# Patient Record
Sex: FEMALE | Race: WHITE | HISPANIC OR LATINO | Employment: UNEMPLOYED | URBAN - METROPOLITAN AREA
[De-identification: names, ages, dates, MRNs, and addresses within clinical notes are randomized per-mention and may not be internally consistent; named-entity substitution may affect disease eponyms.]

---

## 2020-02-11 ENCOUNTER — HOSPITAL ENCOUNTER (EMERGENCY)
Facility: HOSPITAL | Age: 60
Discharge: HOME | End: 2020-02-12
Attending: EMERGENCY MEDICINE | Admitting: EMERGENCY MEDICINE
Payer: COMMERCIAL

## 2020-02-11 ENCOUNTER — APPOINTMENT (EMERGENCY)
Dept: CT IMAGING | Facility: HOSPITAL | Age: 60
End: 2020-02-11
Payer: COMMERCIAL

## 2020-02-11 DIAGNOSIS — R10.13 EPIGASTRIC PAIN: ICD-10-CM

## 2020-02-11 DIAGNOSIS — N30.90 CYSTITIS: Primary | ICD-10-CM

## 2020-02-11 LAB
ALBUMIN SERPL BCP-MCNC: 3.6 G/DL (ref 3.5–5)
ALP SERPL-CCNC: 89 U/L (ref 46–116)
ALT SERPL W P-5'-P-CCNC: 24 U/L (ref 12–78)
ANION GAP SERPL CALCULATED.3IONS-SCNC: 8 MMOL/L (ref 4–13)
AST SERPL W P-5'-P-CCNC: 18 U/L (ref 5–45)
BACTERIA UR QL AUTO: ABNORMAL /HPF
BASOPHILS # BLD AUTO: 0.02 THOUSANDS/ΜL (ref 0–0.1)
BASOPHILS NFR BLD AUTO: 0 % (ref 0–1)
BILIRUB SERPL-MCNC: 0.29 MG/DL (ref 0.2–1)
BILIRUB UR QL STRIP: ABNORMAL
BUN SERPL-MCNC: 13 MG/DL (ref 5–25)
CALCIUM SERPL-MCNC: 8.9 MG/DL (ref 8.3–10.1)
CAOX CRY URNS QL MICRO: ABNORMAL /HPF
CHLORIDE SERPL-SCNC: 107 MMOL/L (ref 100–108)
CLARITY UR: ABNORMAL
CO2 SERPL-SCNC: 30 MMOL/L (ref 21–32)
COLOR UR: ABNORMAL
CREAT SERPL-MCNC: 0.84 MG/DL (ref 0.6–1.3)
EOSINOPHIL # BLD AUTO: 0.19 THOUSAND/ΜL (ref 0–0.61)
EOSINOPHIL NFR BLD AUTO: 2 % (ref 0–6)
ERYTHROCYTE [DISTWIDTH] IN BLOOD BY AUTOMATED COUNT: 14 % (ref 11.6–15.1)
GFR SERPL CREATININE-BSD FRML MDRD: 77 ML/MIN/1.73SQ M
GLUCOSE SERPL-MCNC: 88 MG/DL (ref 65–140)
GLUCOSE UR STRIP-MCNC: NEGATIVE MG/DL
HCT VFR BLD AUTO: 38 % (ref 34.8–46.1)
HGB BLD-MCNC: 12.3 G/DL (ref 11.5–15.4)
HGB UR QL STRIP.AUTO: ABNORMAL
IMM GRANULOCYTES # BLD AUTO: 0.02 THOUSAND/UL (ref 0–0.2)
IMM GRANULOCYTES NFR BLD AUTO: 0 % (ref 0–2)
KETONES UR STRIP-MCNC: ABNORMAL MG/DL
LEUKOCYTE ESTERASE UR QL STRIP: ABNORMAL
LIPASE SERPL-CCNC: 168 U/L (ref 73–393)
LYMPHOCYTES # BLD AUTO: 3.92 THOUSANDS/ΜL (ref 0.6–4.47)
LYMPHOCYTES NFR BLD AUTO: 42 % (ref 14–44)
MCH RBC QN AUTO: 29 PG (ref 26.8–34.3)
MCHC RBC AUTO-ENTMCNC: 32.4 G/DL (ref 31.4–37.4)
MCV RBC AUTO: 90 FL (ref 82–98)
MONOCYTES # BLD AUTO: 0.41 THOUSAND/ΜL (ref 0.17–1.22)
MONOCYTES NFR BLD AUTO: 4 % (ref 4–12)
MUCOUS THREADS UR QL AUTO: ABNORMAL
NEUTROPHILS # BLD AUTO: 4.76 THOUSANDS/ΜL (ref 1.85–7.62)
NEUTS SEG NFR BLD AUTO: 52 % (ref 43–75)
NITRITE UR QL STRIP: NEGATIVE
NON-SQ EPI CELLS URNS QL MICRO: ABNORMAL /HPF
NRBC BLD AUTO-RTO: 0 /100 WBCS
PH UR STRIP.AUTO: 6.5 [PH] (ref 4.5–8)
PLATELET # BLD AUTO: 309 THOUSANDS/UL (ref 149–390)
PMV BLD AUTO: 10.2 FL (ref 8.9–12.7)
POTASSIUM SERPL-SCNC: 3.3 MMOL/L (ref 3.5–5.3)
PROT SERPL-MCNC: 7.7 G/DL (ref 6.4–8.2)
PROT UR STRIP-MCNC: ABNORMAL MG/DL
RBC # BLD AUTO: 4.24 MILLION/UL (ref 3.81–5.12)
RBC #/AREA URNS AUTO: ABNORMAL /HPF
SODIUM SERPL-SCNC: 145 MMOL/L (ref 136–145)
SP GR UR STRIP.AUTO: 1.02 (ref 1–1.03)
UROBILINOGEN UR QL STRIP.AUTO: 1 E.U./DL
WBC # BLD AUTO: 9.32 THOUSAND/UL (ref 4.31–10.16)
WBC #/AREA URNS AUTO: ABNORMAL /HPF

## 2020-02-11 PROCEDURE — 36415 COLL VENOUS BLD VENIPUNCTURE: CPT | Performed by: EMERGENCY MEDICINE

## 2020-02-11 PROCEDURE — 85025 COMPLETE CBC W/AUTO DIFF WBC: CPT | Performed by: EMERGENCY MEDICINE

## 2020-02-11 PROCEDURE — 81001 URINALYSIS AUTO W/SCOPE: CPT

## 2020-02-11 PROCEDURE — 80053 COMPREHEN METABOLIC PANEL: CPT | Performed by: EMERGENCY MEDICINE

## 2020-02-11 PROCEDURE — 99285 EMERGENCY DEPT VISIT HI MDM: CPT | Performed by: EMERGENCY MEDICINE

## 2020-02-11 PROCEDURE — 87086 URINE CULTURE/COLONY COUNT: CPT

## 2020-02-11 PROCEDURE — 99284 EMERGENCY DEPT VISIT MOD MDM: CPT

## 2020-02-11 PROCEDURE — 96361 HYDRATE IV INFUSION ADD-ON: CPT

## 2020-02-11 PROCEDURE — 83690 ASSAY OF LIPASE: CPT | Performed by: EMERGENCY MEDICINE

## 2020-02-11 PROCEDURE — 74177 CT ABD & PELVIS W/CONTRAST: CPT

## 2020-02-11 PROCEDURE — 96374 THER/PROPH/DIAG INJ IV PUSH: CPT

## 2020-02-11 RX ORDER — LIDOCAINE HYDROCHLORIDE 20 MG/ML
10 SOLUTION OROPHARYNGEAL ONCE
Status: COMPLETED | OUTPATIENT
Start: 2020-02-11 | End: 2020-02-11

## 2020-02-11 RX ORDER — ONDANSETRON 2 MG/ML
4 INJECTION INTRAMUSCULAR; INTRAVENOUS ONCE
Status: COMPLETED | OUTPATIENT
Start: 2020-02-11 | End: 2020-02-11

## 2020-02-11 RX ORDER — MAGNESIUM HYDROXIDE/ALUMINUM HYDROXICE/SIMETHICONE 120; 1200; 1200 MG/30ML; MG/30ML; MG/30ML
30 SUSPENSION ORAL ONCE
Status: COMPLETED | OUTPATIENT
Start: 2020-02-11 | End: 2020-02-11

## 2020-02-11 RX ADMIN — ALUMINUM HYDROXIDE, MAGNESIUM HYDROXIDE, AND SIMETHICONE 30 ML: 200; 200; 20 SUSPENSION ORAL at 22:37

## 2020-02-11 RX ADMIN — IOHEXOL 100 ML: 350 INJECTION, SOLUTION INTRAVENOUS at 23:26

## 2020-02-11 RX ADMIN — ONDANSETRON 4 MG: 2 INJECTION INTRAMUSCULAR; INTRAVENOUS at 22:35

## 2020-02-11 RX ADMIN — SODIUM CHLORIDE 1000 ML: 0.9 INJECTION, SOLUTION INTRAVENOUS at 22:35

## 2020-02-11 RX ADMIN — LIDOCAINE HYDROCHLORIDE 10 ML: 20 SOLUTION ORAL; TOPICAL at 22:37

## 2020-02-12 VITALS
SYSTOLIC BLOOD PRESSURE: 114 MMHG | HEART RATE: 60 BPM | DIASTOLIC BLOOD PRESSURE: 69 MMHG | BODY MASS INDEX: 31.6 KG/M2 | OXYGEN SATURATION: 99 % | WEIGHT: 151.2 LBS | TEMPERATURE: 98.3 F | RESPIRATION RATE: 20 BRPM

## 2020-02-12 RX ORDER — CEPHALEXIN 250 MG/1
500 CAPSULE ORAL ONCE
Status: COMPLETED | OUTPATIENT
Start: 2020-02-12 | End: 2020-02-12

## 2020-02-12 RX ORDER — NITROFURANTOIN 25; 75 MG/1; MG/1
100 CAPSULE ORAL 2 TIMES DAILY
Qty: 10 CAPSULE | Refills: 0 | Status: SHIPPED | OUTPATIENT
Start: 2020-02-12 | End: 2020-02-17

## 2020-02-12 RX ORDER — PANTOPRAZOLE SODIUM 40 MG/1
40 TABLET, DELAYED RELEASE ORAL DAILY
Qty: 14 TABLET | Refills: 0 | Status: SHIPPED | OUTPATIENT
Start: 2020-02-12 | End: 2020-02-26

## 2020-02-12 RX ORDER — POTASSIUM CHLORIDE 20 MEQ/1
40 TABLET, EXTENDED RELEASE ORAL ONCE
Status: COMPLETED | OUTPATIENT
Start: 2020-02-12 | End: 2020-02-12

## 2020-02-12 RX ADMIN — CEPHALEXIN 500 MG: 250 CAPSULE ORAL at 00:45

## 2020-02-12 RX ADMIN — POTASSIUM CHLORIDE 40 MEQ: 1500 TABLET, EXTENDED RELEASE ORAL at 00:46

## 2020-02-12 NOTE — ED PROVIDER NOTES
History  Chief Complaint   Patient presents with    Abdominal Pain     per pt "she has been having some abdominal pain for 2wks now with the pain progressively getting worse, pt confirms some N/D but no V "       History provided by:  Patient   used: No    Abdominal Pain   Associated symptoms: diarrhea and nausea    Associated symptoms: no chest pain, no chills, no cough, no dysuria, no fever, no shortness of breath, no sore throat and no vomiting      Patient is a 67-year-old female presenting to emergency department with epigastric pain for last 2 weeks  Getting worse  Worse with eating  Nausea  No vomiting  Having makes of watery and soft bowel movements  10 lb weight loss  No urine complaints  No fevers  No chest pain PND patient's of breath  No history of reflux  No history of getting endoscopy  MDM concern for gastritis, with the weight loss will get CT, will check gallbladder and check lipase  gi cocktail      Prior to Admission Medications   Prescriptions Last Dose Informant Patient Reported? Taking?   oxyCODONE-acetaminophen (PERCOCET) 5-325 mg per tablet   No No   Sig: Take 1 tablet by mouth every 6 (six) hours as needed for moderate pain  Max Daily Amount: 4 tablets      Facility-Administered Medications: None       Past Medical History:   Diagnosis Date    Renal disorder        History reviewed  No pertinent surgical history  History reviewed  No pertinent family history  I have reviewed and agree with the history as documented  Social History     Tobacco Use    Smoking status: Never Smoker    Smokeless tobacco: Never Used   Substance Use Topics    Alcohol use: No    Drug use: No       Review of Systems   Constitutional: Positive for unexpected weight change  Negative for chills, diaphoresis and fever  HENT: Negative for congestion and sore throat  Respiratory: Negative for cough, shortness of breath, wheezing and stridor      Cardiovascular: Negative for chest pain, palpitations and leg swelling  Gastrointestinal: Positive for abdominal pain, diarrhea and nausea  Negative for blood in stool and vomiting  Genitourinary: Negative for dysuria, frequency and urgency  Musculoskeletal: Negative for neck pain and neck stiffness  Skin: Negative for pallor and rash  Neurological: Negative for dizziness, syncope, weakness, light-headedness and headaches  All other systems reviewed and are negative  Physical Exam  Physical Exam   Constitutional: She is oriented to person, place, and time  She appears well-developed and well-nourished  HENT:   Head: Normocephalic and atraumatic  Eyes: Pupils are equal, round, and reactive to light  Neck: Neck supple  Cardiovascular: Normal rate, regular rhythm, normal heart sounds and intact distal pulses  Pulmonary/Chest: Effort normal and breath sounds normal  No respiratory distress  Abdominal: Soft  Bowel sounds are normal  There is tenderness in the epigastric area  Neurological: She is alert and oriented to person, place, and time  Skin: Skin is warm and dry  Capillary refill takes less than 2 seconds  She is not diaphoretic  No erythema  No pallor  Vitals reviewed        Vital Signs  ED Triage Vitals [02/11/20 2132]   Temperature Pulse Respirations Blood Pressure SpO2   98 3 °F (36 8 °C) 65 20 145/66 100 %      Temp Source Heart Rate Source Patient Position - Orthostatic VS BP Location FiO2 (%)   Oral Monitor Sitting Left arm --      Pain Score       9           Vitals:    02/11/20 2132 02/12/20 0014 02/12/20 0043   BP: 145/66 119/71 114/69   Pulse: 65 60 60   Patient Position - Orthostatic VS: Sitting Sitting Lying         Visual Acuity      ED Medications  Medications   ondansetron (ZOFRAN) injection 4 mg (4 mg Intravenous Given 2/11/20 2235)   sodium chloride 0 9 % bolus 1,000 mL (0 mL Intravenous Stopped 2/12/20 0013)   aluminum-magnesium hydroxide-simethicone (MYLANTA) 200-200-20 mg/5 mL oral suspension 30 mL (30 mL Oral Given 2/11/20 2237)   Lidocaine Viscous HCl (XYLOCAINE) 2 % mucosal solution 10 mL (10 mL Swish & Swallow Given 2/11/20 2237)   iohexol (OMNIPAQUE) 350 MG/ML injection (MULTI-DOSE) 100 mL (100 mL Intravenous Given 2/11/20 2326)   cephalexin (KEFLEX) capsule 500 mg (500 mg Oral Given 2/12/20 0045)   potassium chloride (K-DUR,KLOR-CON) CR tablet 40 mEq (40 mEq Oral Given 2/12/20 0046)       Diagnostic Studies  Results Reviewed     Procedure Component Value Units Date/Time    Comprehensive metabolic panel [[id number]]  (Abnormal) Collected:  02/11/20 2234    Lab Status:  Final result Specimen:  Blood from Arm, Right Updated:  02/11/20 2309     Sodium 145 mmol/L      Potassium 3 3 mmol/L      Chloride 107 mmol/L      CO2 30 mmol/L      ANION GAP 8 mmol/L      BUN 13 mg/dL      Creatinine 0 84 mg/dL      Glucose 88 mg/dL      Calcium 8 9 mg/dL      AST 18 U/L      ALT 24 U/L      Alkaline Phosphatase 89 U/L      Total Protein 7 7 g/dL      Albumin 3 6 g/dL      Total Bilirubin 0 29 mg/dL      eGFR 77 ml/min/1 73sq m     Narrative:       [place] guidelines for Chronic Kidney Disease (CKD):     Stage 1 with normal or high GFR (GFR > 90 mL/min/1 73 square meters)    Stage 2 Mild CKD (GFR = 60-89 mL/min/1 73 square meters)    Stage 3A Moderate CKD (GFR = 45-59 mL/min/1 73 square meters)    Stage 3B Moderate CKD (GFR = 30-44 mL/min/1 73 square meters)    Stage 4 Severe CKD (GFR = 15-29 mL/min/1 73 square meters)    Stage 5 End Stage CKD (GFR <15 mL/min/1 73 square meters)  Note: GFR calculation is accurate only with a steady state creatinine    Lipase [[id number]]  (Normal) Collected:  02/11/20 2234    Lab Status:  Final result Specimen:  Blood from Arm, Right Updated:  02/11/20 2309     Lipase 168 u/L     Urine Microscopic [[id number]]  (Abnormal) Collected:  02/11/20 2240    Lab Status:  Final result Specimen:  Urine, Clean Catch Updated:  02/11/20 2304     RBC, UA 2-4 /hpf      WBC, UA Innumerable /hpf      Epithelial Cells None Seen /hpf      Bacteria, UA Occasional /hpf      Ca Oxalate Mis, UA Occasional /hpf      MUCUS THREADS Occasional    Urine culture [[id number]] Collected:  02/11/20 2240    Lab Status: In process Specimen:  Urine, Clean Catch Updated:  02/11/20 2304    CBC and differential [[id number]] Collected:  02/11/20 2234    Lab Status:  Final result Specimen:  Blood from Arm, Right Updated:  02/11/20 2246     WBC 9 32 Thousand/uL      RBC 4 24 Million/uL      Hemoglobin 12 3 g/dL      Hematocrit 38 0 %      MCV 90 fL      MCH 29 0 pg      MCHC 32 4 g/dL      RDW 14 0 %      MPV 10 2 fL      Platelets 546 Thousands/uL      nRBC 0 /100 WBCs      Neutrophils Relative 52 %      Immat GRANS % 0 %      Lymphocytes Relative 42 %      Monocytes Relative 4 %      Eosinophils Relative 2 %      Basophils Relative 0 %      Neutrophils Absolute 4 76 Thousands/µL      Immature Grans Absolute 0 02 Thousand/uL      Lymphocytes Absolute 3 92 Thousands/µL      Monocytes Absolute 0 41 Thousand/µL      Eosinophils Absolute 0 19 Thousand/µL      Basophils Absolute 0 02 Thousands/µL     Urine Macroscopic, POC [[id number]]  (Abnormal) Collected:  02/11/20 2240    Lab Status:  Final result Specimen:  Urine Updated:  02/11/20 2235     Color, UA Lilly     Clarity, UA Slightly Cloudy     pH, UA 6 5     Leukocytes, UA Moderate     Nitrite, UA Negative     Protein, UA 30 (1+) mg/dl      Glucose, UA Negative mg/dl      Ketones, UA 40 (2+) mg/dl      Urobilinogen, UA 1 0 E U /dl      Bilirubin, UA Interference- unable to analyze     Blood, UA Moderate     Specific Gravity, UA 1 025    Narrative:       CLINITEK RESULT                 CT abdomen pelvis with contrast   Final Result by Jaclyn Franklin MD (02/12 0020)      1  Mild thickening of the urinary bladder wall, correlate with urinalysis for cystitis  2   Other findings as above                 Workstation performed: [id number] Procedures  Procedures         ED Course  ED Course as of Feb 12 0556 Wed Feb 12, 2020   0050 Urine culture from 3 years ago shows susceptible to Yeimi Hetal  Will discharge on Macrobid  MDM      Disposition  Final diagnoses:   Cystitis   Epigastric pain     Time reflects when diagnosis was documented in both MDM as applicable and the Disposition within this note     Time User Action Codes Description Comment    2/12/2020 12:51 AM Caro Nieto Add [N30 90] Cystitis     2/12/2020 12:51 AM Caro Nieto Add [R10 13] Epigastric pain       ED Disposition     ED Disposition Condition Date/Time Comment    Discharge Stable Wed Feb 12, 2020 12:51 AM Lawrence Chahal discharge to home/self care              Follow-up Information     Follow up With Specialties Details Why Contact Info Additional [address] Emergency Department Emergency Medicine  As needed, If symptoms worsen [address] [address] [phone], [address], [place], [place], [address] [facility] Gastro[facility] Specialists [facility] Gastroenterology Call in 1 day Please call and follow-up for epigastric pain [place] [facility] [address] [phone] [facility] Specialists [facility] [place], [address] [address], [facility] [place], [place], [address] [facility] Call today Please call and follow up with family doctor, get repeat urine exam [address] [facility] [id number]  [address], [address] [place], [place], [facility]          Discharge Medication List as of 2/12/2020 12:54 AM      START taking these medications    Details   nitrofurantoin (MACROBID) 100 mg capsule Take 1 capsule (100 mg total) by mouth 2 (two) times a day for 5 days, Starting Wed 2/12/2020, Until Mon 2/17/2020, Print      pantoprazole (PROTONIX) 40 mg tablet Take 1 tablet (40 mg total) by mouth daily for 14 days, Starting Wed 2/12/2020, Until Wed 2/26/2020, Print         CONTINUE these medications which have NOT CHANGED    Details   oxyCODONE-acetaminophen (PERCOCET) 5-325 mg per tablet Take 1 tablet by mouth every 6 (six) hours as needed for moderate pain  Max Daily Amount: 4 tablets, Starting 5/23/2016, Until Discontinued, Print           No discharge procedures on file      PDMP Review     None          ED Provider  Electronically Signed by           Keturah Hurd MD  02/12/20 9109

## 2020-02-13 LAB — BACTERIA UR CULT: NORMAL

## 2020-02-26 ENCOUNTER — OFFICE VISIT (OUTPATIENT)
Dept: FAMILY MEDICINE CLINIC | Facility: CLINIC | Age: 60
End: 2020-02-26

## 2020-02-26 VITALS
HEART RATE: 60 BPM | RESPIRATION RATE: 16 BRPM | SYSTOLIC BLOOD PRESSURE: 130 MMHG | TEMPERATURE: 96.9 F | WEIGHT: 147.8 LBS | BODY MASS INDEX: 30.89 KG/M2 | DIASTOLIC BLOOD PRESSURE: 80 MMHG

## 2020-02-26 DIAGNOSIS — R10.13 EPIGASTRIC PAIN: Primary | ICD-10-CM

## 2020-02-26 DIAGNOSIS — Z12.11 SCREEN FOR COLON CANCER: ICD-10-CM

## 2020-02-26 DIAGNOSIS — Z12.39 SCREENING FOR BREAST CANCER: ICD-10-CM

## 2020-02-26 DIAGNOSIS — F32.A DEPRESSION, UNSPECIFIED DEPRESSION TYPE: ICD-10-CM

## 2020-02-26 DIAGNOSIS — Z23 ENCOUNTER FOR IMMUNIZATION: ICD-10-CM

## 2020-02-26 DIAGNOSIS — E87.6 HYPOKALEMIA: ICD-10-CM

## 2020-02-26 PROCEDURE — 90471 IMMUNIZATION ADMIN: CPT | Performed by: PHYSICIAN ASSISTANT

## 2020-02-26 PROCEDURE — 90715 TDAP VACCINE 7 YRS/> IM: CPT | Performed by: PHYSICIAN ASSISTANT

## 2020-02-26 PROCEDURE — 99203 OFFICE O/P NEW LOW 30 MIN: CPT | Performed by: PHYSICIAN ASSISTANT

## 2020-02-26 RX ORDER — PANTOPRAZOLE SODIUM 40 MG/1
40 TABLET, DELAYED RELEASE ORAL DAILY
Qty: 30 TABLET | Refills: 1 | Status: SHIPPED | OUTPATIENT
Start: 2020-02-26 | End: 2020-05-15

## 2020-02-26 NOTE — ASSESSMENT & PLAN NOTE
Depression Screening Follow-up Plan: Patient's depression screening was positive with a PHQ-2 score of 6  Their PHQ-9 score was 11  Patient's depressive symptoms likely due to other medical condition  Would recommend treatment of underlying condition  Will continue to monitor at next office visit

## 2020-02-26 NOTE — PATIENT INSTRUCTIONS
Dieta para úlceras estomacales y gastritis   CUIDADO AMBULATORIO:   Henry dieta para úlceras estomacales y gastritis  es un plan alimenticio que limita los alimentos que irritan lucas BJURHOLM  Ciertos alimentos [employer] síntomas toby dolor de ANNIKA, Ci[place], acidez estomacal o indigestión  Alimentos que debe limitar o evitar:  Es posible que usted necesite evitar los alimentos ácidos, picantes o altos en grasa  No todos los alimentos afectan a las personas de la [employer]  Usted necesitará aprender cuáles alimentos empeoran josse síntomas y tendrá que limitar esos alimentos  Los siguientes son algunos alimentos que podrían empeorar henry úlcera o los síntomas de la gastritis:  · Bebidas:      ¨ Leche entera y [place] chocolatada    ¨ Chocolate caliente y refrescos de cola    ¨ Cualquier bebida con cafeína    ¨ Café regular y descafeinado    ¨ Té de hierbabuena y menta daly    ¨ Té daly o tressa, regular o descafeinado    ¨ Jugos de [facility] y [facility]    ¨ Bebidas alcohólicas    · Especias y condimentos:      Myrle Lilly y jayce    ¨ Polvo de [facility]    ¨ Semilla de [facility] y Prosperity Donna moscada    · Otros alimentos:      ¨ Alimentos lácteos hechos de leche entera o crema    ¨ Chocolate    ¨ Quesos picantes o de sabor maral, toby de [employer] o александр devyn    ¨ Carne con alto contenido de grasa y muy condimentada, toby el melissa, salham, blaise, Dede y embutidos    ¨ Chiles picantes    ¨ Productos derivados del tomate, toby pasta de [place], salsa o jugo del mismo  Alimentos que puede incluir:  Consuma henry variedad de alimentos saludables de todos los grupos alimenticios  Consuma frutas, verduras, granos enteros y productos lácteos descremados o bajos en grasa  Los granos [employer], los cereales, la pasta y el arroz integral  Elija la deshaun Bennett, aves de cruz (kellen Cuellar), pescado, frijoles, huevos y osmani secos   Un plan alimenticio saludable tiene un contenido bajo de grasas no saludables, sal y azúcar  Las grasas saludables incluyen el aceite de nieto y de canola  Solicite a kennedy dietista más información acerca de un plan alimenticio saludable  Otras pautas útiles:   · No coma freedom antes de acostarse  Deje de comer por lo menos 2 horas antes de acostarse  · Coma comidas pequeñas y con frecuencia  Es probable que kennedy estómago tolere mejor comidas pequeñas y frecuentes en vez de comidas grandes  © 2017 [address] Information is for End User's use only and may not be sold, redistributed or otherwise used for commercial purposes  All illustrations and images included in CareNotes® are the copyrighted property of A D A M , Inc  or Lukasz Scott  Esta información es sólo para uso en educación  Kennedy intención no es darle un consejo médico sobre enfermedades o tratamientos  Colsulte con kennedy [facility] farmacéutico antes de seguir cualquier régimen médico para saber si es seguro y efectivo para usted  Lista de alimentos con contenido de potasio   LO QUE NECESITA SABER:   El potasio es un mineral que se encuentra en la mayoría de los alimentos  El potasio ayuda a balancear los líquidos y minerales en kennedy cuerpo  También ayuda al cuerpo a mantener la presión sanguínea a un nivel normal  El potasio facilita las contracciones musculares y la función [place] de los nervios  Es posible que deba aumentar o disminuir el consumo de potasio si sufre ciertas afecciones médicas  INSTRUCCIONES SOBRE EL CLAIRE HOSPITALARIA:   Por qué es posible que deba cambiar la cantidad de potasio que consume:   · Es posible que deba consumir más potasio  si usted tiene hipocalemia (niveles bajos de potasio) o presión arterial claire  También podría necesitar más potasio en kennedy dieta si usted binh diuréticos  Los diuréticos y ciertos medicamentos causan que kennedy cuerpo pierda potasio       · Es posible que deba consumir menos potasio  en kennedy dieta si usted tiene hipercalemia (niveles altos de potasio) o henry enfermedad renal   Contenido de potasio en las frutas:  La cantidad de potasio en miligramos (mg) en cada fruta o porción de fruta, aparece en la lista junto a cada elemento    · Alimentos con alto contenido de potasio (más de 200 mg por porción):      ¨ 1 plátano mediano (425)    ¨ ½ papaya (390)    ¨ ½ taza de jugo de ciruela pasa (370)    ¨ ¼ de henry taza de pasas (270)    ¨ 1 destiney mediano (325) o kiwi (240)    ¨ 1 naranja pequeña (240) o ½ taza de jugo de naranja (235)    ¨ ½ taza de melón myles (215) o melón daly myles (200)    ¨ 1 ani mediana (200)    · Alimentos con un contenido medio de potasio (50 a 200 mg por porción):      ¨ 1 durazno mediano (185)    ¨ 1 manzana pequeña o ½ taza de jugo de Corpus suyapa (150)    ¨ ½ taza de duraznos enlatados en jugo (120)    ¨ ½ taza de concepcion enlatada (100)    ¨ ½ taza de fresas frescas y rebanadas (125)    ¨ ½ taza de sandía (85)    · Alimentos con bajo contenido de potasio (menos de 50 mg por porción):      ¨ ½ taza de arándanos (45) o cóctel de jugo de arándano (20)    ¨ ½ taza de néctar de papaya, destiney o ani (35)  Contenido de [employer] vegetales:   · Alimentos con alto contenido de potasio (más de 200 mg por porción):      ¨ 1 papa mediana horneada con cascara (925)    ¨ 1 camote mediano horneado con cascara (450)    ¨ ½ taza de tomate o jugo de vegetales (275), o 1 tomate crudo de tamaño mediano (290)    ¨ ½ taza de champiñones (280)    ¨ ½ taza de coles de Bruselas frescas (250)    ¨ ½ taza de calabacín cocida (220) o calabaza de invierno (250)    ¨ ¼ de un aguacate mediano (245)    ¨ ½ taza de brócoli (230)    · Alimentos con un contenido medio de potasio (50 a 200 mg por porción):      ¨ ½ taza de maíz (195)    ¨ ½ taza de zanahorias frescas o cocidas (180)    ¨ ½ taza de coliflor fresco (150)    ¨ ½ taza de espárragos (155)    ¨ ½ taza de guisantes enlatados (90)     ¨ 1 taza de parminder, de todo tipo (100)    ¨ ½ taza de habas verdes frescas (90)    ¨ ½ taza de habas verdes congeladas (85)    ¨ ½ taza de pepino (80)  Contenido de potasio en los alimentos ricos en proteína:   · Alimentos con alto contenido de potasio (más de 200 mg por porción):      ¨ ½ taza de frijoles pintos cocidos (400) o lentejas (365)    ¨ 1 taza de leche de soya (300)    ¨ 3 onzas de salmón horneado o asado (319)    ¨ 3 onzas de pavo asado, carne oscura (250)    ¨ ¼ de henry taza de semillas de girasol (241)    ¨ 3 onzas de carne magra de res cocida (224)    ¨ 2 cucharadas de mantequilla de maní suave (210)    · Alimentos con un contenido medio de potasio (50 a 200 mg por porción):      ¨ 1 onza de maní, almendras o anacardos salados (200)    ¨ 1 huevo florinda (60 mg)  Contenido de [employer] productos lácteos:   · Alimentos con alto contenido de potasio (más de 200 mg por porción):      ¨ 6 onzas de yogur (260 a 435)    ¨ 1 taza de leche entera o descremada y baja en grasas (350 a 380)    · Alimentos con un contenido medio de potasio (50 a 200 mg por porción):      ¨ ½ taza de queso ricotta (154)    ¨ ½ taza de helado de vainilla (131)    ¨ ½ taza de requesón (2%) bajo en grasa (110)    · Alimentos con bajo contenido de potasio (menos de 50 mg por porción):      ¨ 1 onza de queso (20 a 30)    Contenido de potasio de los granos:   · 1 rebanada de pan chew (30)    · ½ taza de arroz chew o integral (50)    · ½ taza de espagueti o macarrones (30)    · 1 tortilla de harina o maíz (50)    · 1 wafle de cuatro pulgadas (50)  Contenido de potasio en otros alimentos:   · 1 cucharada de melazas (295)    · 1½ onzas de chocolate (165)    · Algunos sustitutos de la sal pueden contener henry lucio cantidad de potasio  Revise la etiqueta de los alimentos para encontrar la cantidad de potasio que contienen  © 2017 2600 Miguel Rizvi Information is for End User's use only and may not be sold, redistributed or otherwise used for commercial purposes   All illustrations and images included in CareNotes® are the copyrighted property of A D A M , Inc  or Lukasz Scott  Esta información es sólo para uso en educación  Lucas intención no es darle un consejo médico sobre enfermedades o tratamientos  Colsulte con lucas Latanya Clipper farmacéutico antes de seguir cualquier régimen médico para saber si es seguro y efectivo para usted

## 2020-02-26 NOTE — PROGRESS NOTES
Assessment/Plan:    Screening for breast cancer  Requisition provided for screening mammogram    Screen for colon cancer  Refer to GI for screening colonoscopy    Hypokalemia  3 3 2/11/2020  Will recheck    Epigastric pain  Restart Protonix 40 mg daily until seen by GI  Patient has an appointment scheduled to see GI on 03/11/2020  Advised to follow a GERD free diet, handout provided     Depression  Depression Screening Follow-up Plan: Patient's depression screening was positive with a PHQ-2 score of 6  Their PHQ-9 score was 11  Patient's depressive symptoms likely due to other medical condition  Would recommend treatment of underlying condition  Will continue to monitor at next office visit  Diagnoses and all orders for this visit:    Epigastric pain  -     pantoprazole (PROTONIX) 40 mg tablet; Take 1 tablet (40 mg total) by mouth daily    Screen for colon cancer  -     Ambulatory referral to Gastroenterology; Future    Screening for breast cancer  -     Mammo screening bilateral w cad; Future    Encounter for immunization  -     TDAP VACCINE GREATER THAN OR EQUAL TO 8YO IM    Hypokalemia  -     Comprehensive metabolic panel; Future    Depression, unspecified depression type    Other orders  -     Cancel: Hepatitis C antibody; Future  -     Cancel: HIV 1/2 AG-AB combo; Future  -     Cancel: Ambulatory referral to Gastroenterology; Future    Declined hepatitis and HIV screening at this time  Refuse influenza vaccine  Subjective:      Patient ID: Manny Hedrick is a 63 y o  female  New pt here to establish care and c/o  Has past medical h/o gastritis  She was seen at [address] [facility] ED on 2/11/2020 for abdominal pain  Was d/c on protonix 40 mg po qd and referred to GI  Patient has an appointment scheduled with GI for 3/11/2020  She reports some improvement while on Protonix but ran out, burning has returned  Feeling down because of her persist abdominal pain   Is fearing the worst  Listens to her favorite music and begins to feel better  Denies any previous history of depression  Denies any as SI/HI  Abdominal Pain   This is a recurrent problem  The current episode started 1 to 4 weeks ago  The onset quality is gradual  The problem occurs constantly  The problem has been gradually worsening  The pain is located in the generalized abdominal region  The pain is at a severity of 8/10  The quality of the pain is burning  The abdominal pain does not radiate  Associated symptoms include anorexia and nausea  Pertinent negatives include no arthralgias, constipation, diarrhea (Resolved 4-5 days ago), dysuria, fever, frequency, headaches, hematuria, myalgias or vomiting  The pain is aggravated by eating  The pain is relieved by liquids  She has tried proton pump inhibitors for the symptoms  The treatment provided moderate relief  The following portions of the patient's history were reviewed and updated as appropriate: allergies, current medications, past family history, past medical history, past social history, past surgical history and problem list     Review of Systems   Constitutional: Negative for chills, fatigue and fever  Respiratory: Negative for cough, chest tightness, shortness of breath, wheezing and stridor  Cardiovascular: Negative for chest pain and palpitations  Gastrointestinal: Positive for abdominal pain, anorexia and nausea  Negative for blood in stool, constipation, diarrhea (Resolved 4-5 days ago) and vomiting  Genitourinary: Negative for difficulty urinating, dysuria, frequency and hematuria  Musculoskeletal: Negative for arthralgias, myalgias, neck pain and neck stiffness  Skin: Negative for rash and wound  Neurological: Negative for dizziness, weakness, light-headedness, numbness and headaches  Psychiatric/Behavioral: Negative for agitation, behavioral problems and suicidal ideas  The patient is not nervous/anxious           Feels a little down         Objective:      /80 (BP Location: Left arm, Patient Position: Sitting, Cuff Size: Large)   Pulse 60   Temp (!) 96 9 °F (36 1 °C) (Tympanic)   Resp 16   Wt 67 kg (147 lb 12 8 oz)   BMI 30 89 kg/m²          Physical Exam   Constitutional: She is oriented to person, place, and time  She appears well-developed and well-nourished  No distress  HENT:   Head: Normocephalic and atraumatic  Neck: Normal range of motion  Neck supple  Cardiovascular: Normal rate, regular rhythm and normal heart sounds  No murmur heard  Pulmonary/Chest: Effort normal and breath sounds normal  No respiratory distress  She has no wheezes  Abdominal: Soft  Normal appearance and bowel sounds are normal  There is tenderness in the epigastric area  There is no rigidity, no rebound, no guarding and no CVA tenderness  Musculoskeletal: She exhibits no edema or deformity  Lymphadenopathy:     She has no cervical adenopathy  Neurological: She is alert and oriented to person, place, and time     Psychiatric: Her speech is normal and behavior is normal  Judgment and thought content normal  Cognition and memory are normal    Tearful at times

## 2020-02-26 NOTE — ASSESSMENT & PLAN NOTE
Restart Protonix 40 mg daily until seen by GI  Patient has an appointment scheduled to see GI on 03/11/2020    Advised to follow a GERD free diet, handout provided

## 2020-02-28 DIAGNOSIS — Z12.39 SCREENING FOR BREAST CANCER: ICD-10-CM

## 2020-03-11 ENCOUNTER — OFFICE VISIT (OUTPATIENT)
Dept: GASTROENTEROLOGY | Facility: CLINIC | Age: 60
End: 2020-03-11

## 2020-03-11 VITALS
HEART RATE: 76 BPM | SYSTOLIC BLOOD PRESSURE: 114 MMHG | DIASTOLIC BLOOD PRESSURE: 60 MMHG | RESPIRATION RATE: 18 BRPM | TEMPERATURE: 97.6 F | HEIGHT: 59 IN | BODY MASS INDEX: 29.43 KG/M2 | WEIGHT: 146 LBS

## 2020-03-11 DIAGNOSIS — R10.10 PAIN OF UPPER ABDOMEN: Primary | ICD-10-CM

## 2020-03-11 DIAGNOSIS — Z12.11 SCREEN FOR COLON CANCER: ICD-10-CM

## 2020-03-11 DIAGNOSIS — K76.89 LIVER CYST: ICD-10-CM

## 2020-03-11 PROCEDURE — 99204 OFFICE O/P NEW MOD 45 MIN: CPT | Performed by: INTERNAL MEDICINE

## 2020-03-11 NOTE — PROGRESS NOTES
Consultation - 126 [facility]  Arleen Ricardo 1960 female         Chief Complaint:  Abdominal pain    HPI:  62-year-old  female with no significant past medical history reports having abdominal pain for more than a month  We used telephone  for interview  She was seen in the emergency room last month and has been taking Protonix since then with help  She had CT scan, lab workup which were reported to be unremarkable  Denies any NSAID use  Complaining about occasional nausea but denies any vomiting  Good appetite, no recent weight loss  Regular bowel movements and denies any blood or mucus in the stool  She never had colonoscopy in the past     I have reviewed the CT scan which showed large septated cyst in the liver measuring 6 4 x 4 7 cm which has increased from 5 0 x 3 6 few years ago  REVIEW OF SYSTEMS: Review of Systems   Constitutional: Negative for activity change, appetite change, chills, diaphoresis, fatigue, fever and unexpected weight change  HENT: Negative for ear discharge, ear pain, facial swelling, hearing loss, nosebleeds, sore throat, tinnitus and voice change  Eyes: Negative for pain, discharge, redness, itching and visual disturbance  Respiratory: Negative for apnea, cough, chest tightness, shortness of breath and wheezing  Cardiovascular: Negative for chest pain and palpitations  Gastrointestinal:        As noted in HPI   Endocrine: Negative for cold intolerance, heat intolerance and polyuria  Genitourinary: Negative for difficulty urinating, dysuria, flank pain, hematuria and urgency  Musculoskeletal: Negative for arthralgias, back pain, gait problem, joint swelling and myalgias  Skin: Negative for rash and wound  Neurological: Negative for dizziness, tremors, seizures, speech difficulty, light-headedness, numbness and headaches  Hematological: Negative for adenopathy  Does not bruise/bleed easily  Psychiatric/Behavioral: Negative for agitation, behavioral problems and confusion  The patient is not nervous/anxious  Past Medical History:   Diagnosis Date    Renal disorder       Past Surgical History:   Procedure Laterality Date    CHOLECYSTECTOMY       Social History     Socioeconomic History    Marital status: Single     Spouse name: Not on file    Number of children: Not on file    Years of education: Not on file    Highest education level: Not on file   Occupational History    Not on file   Social Needs    Financial resource strain: Not on file    Food insecurity:     Worry: Not on file     Inability: Not on file    Transportation needs:     Medical: Not on file     Non-medical: Not on file   Tobacco Use    Smoking status: Light Tobacco Smoker    Smokeless tobacco: Never Used    Tobacco comment: social    Substance and Sexual Activity    Alcohol use: Yes     Frequency: 2-4 times a month    Drug use: Never    Sexual activity: Yes     Partners: Male     Birth control/protection: None   Lifestyle    Physical activity:     Days per week: Not on file     Minutes per session: Not on file    Stress: Not on file   Relationships    Social connections:     Talks on phone: Not on file     Gets together: Not on file     Attends Pentecostal service: Not on file     Active member of club or organization: Not on file     Attends meetings of clubs or organizations: Not on file     Relationship status: Not on file    Intimate partner violence:     Fear of current or ex partner: Not on file     Emotionally abused: Not on file     Physically abused: Not on file     Forced sexual activity: Not on file   Other Topics Concern    Not on file   Social History Narrative    Not on file     Family History   Problem Relation Age of Onset    Arthritis Mother     Heart attack Father     Coronary artery disease Father     Glaucoma Father      Patient has no known allergies    Current Outpatient Medications Medication Sig Dispense Refill    pantoprazole (PROTONIX) 40 mg tablet Take 1 tablet (40 mg total) by mouth daily 30 tablet 1    Na Sulfate-K Sulfate-Mg Sulf (Suprep Bowel Prep Kit) 17 5-3 13-1 6 GM/177ML SOLN Take 2 Bottles by mouth see administration instructions Please follow the instructions from the office 2 Bottle 0     No current facility-administered medications for this visit  Blood pressure 114/60, pulse 76, temperature 97 6 °F (36 4 °C), resp  rate 18, height 4' 11" (1 499 m), weight 66 2 kg (146 lb)  PHYSICAL EXAM: Physical Exam   Constitutional: She is oriented to person, place, and time  She appears well-developed and well-nourished  HENT:   Head: Normocephalic and atraumatic  Nose: Nose normal    Mouth/Throat: Oropharynx is clear and moist    Eyes: Conjunctivae are normal  Right eye exhibits no discharge  Left eye exhibits no discharge  No scleral icterus  Neck: Neck supple  No JVD present  No tracheal deviation present  No thyromegaly present  Cardiovascular: Normal rate, regular rhythm and normal heart sounds  Exam reveals no gallop and no friction rub  No murmur heard  Pulmonary/Chest: Effort normal and breath sounds normal  No respiratory distress  She has no wheezes  She has no rales  She exhibits no tenderness  Abdominal: Soft  Bowel sounds are normal  She exhibits no distension and no mass  There is no tenderness  There is no rebound and no guarding  No hernia  Musculoskeletal: She exhibits no edema, tenderness or deformity  Lymphadenopathy:     She has no cervical adenopathy  Neurological: She is alert and oriented to person, place, and time  Skin: Skin is warm and dry  No rash noted  No erythema  Psychiatric: She has a normal mood and affect   Her behavior is normal  Thought content normal         Lab Results   Component Value Date    WBC 9 32 02/11/2020    HGB 12 3 02/11/2020    HCT 38 0 02/11/2020    MCV 90 02/11/2020     02/11/2020     Lab Results Component Value Date    CALCIUM 8 9 02/11/2020    K 3 3 (L) 02/11/2020    CO2 30 02/11/2020     02/11/2020    BUN 13 02/11/2020    CREATININE 0 84 02/11/2020     Lab Results   Component Value Date    ALT 24 02/11/2020    AST 18 02/11/2020    ALKPHOS 89 02/11/2020     No results found for: INR, PROTIME    Ct Abdomen Pelvis With Contrast    Result Date: 2/12/2020  Impression: 1  Mild thickening of the urinary bladder wall, correlate with urinalysis for cystitis  2   Other findings as above  Workstation performed: [id number]       ASSESSMENT & PLAN:    Pain of upper abdomen  Nonspecific abdominal pain that has improved on Protonix  Rule out peptic ulcer disease or gastric erosions  Also consider H pylori gastritis  Rule out biliary pathology     -continue Protonix for now    -Patient was explained about the lifestyle and dietary modifications  Advised to avoid fatty foods, chocolates, caffeine, alcohol and any other triggering foods  Avoid eating for at least 3 hours before going to bed  -schedule for EGD    Screen for colon cancer  Screening for colon cancer - patient is at average risk for colon cancer screening  Rule out colorectal lesions including polyps or malignancy         -Schedule for colonoscopy  -High-fiber diet     -Patient was given instructions about the colonoscopy prep     -Patient was explained about  the risks and benefits of the procedure  Risks including but not limited to bleeding, infection, perforation were explained in detail  Also explained about less than 100% sensitivity with the exam and other alternatives            Liver cyst  Septated cyst in the liver that has increased in size from before     -schedule for MRI liver and then consider referral to surgery

## 2020-03-11 NOTE — H&P (VIEW-ONLY)
Consultation - 126 [facility]  Leonela Found 1960 female         Chief Complaint:  Abdominal pain    HPI:  64-year-old  female with no significant past medical history reports having abdominal pain for more than a month  We used telephone  for interview  She was seen in the emergency room last month and has been taking Protonix since then with help  She had CT scan, lab workup which were reported to be unremarkable  Denies any NSAID use  Complaining about occasional nausea but denies any vomiting  Good appetite, no recent weight loss  Regular bowel movements and denies any blood or mucus in the stool  She never had colonoscopy in the past     I have reviewed the CT scan which showed large septated cyst in the liver measuring 6 4 x 4 7 cm which has increased from 5 0 x 3 6 few years ago  REVIEW OF SYSTEMS: Review of Systems   Constitutional: Negative for activity change, appetite change, chills, diaphoresis, fatigue, fever and unexpected weight change  HENT: Negative for ear discharge, ear pain, facial swelling, hearing loss, nosebleeds, sore throat, tinnitus and voice change  Eyes: Negative for pain, discharge, redness, itching and visual disturbance  Respiratory: Negative for apnea, cough, chest tightness, shortness of breath and wheezing  Cardiovascular: Negative for chest pain and palpitations  Gastrointestinal:        As noted in HPI   Endocrine: Negative for cold intolerance, heat intolerance and polyuria  Genitourinary: Negative for difficulty urinating, dysuria, flank pain, hematuria and urgency  Musculoskeletal: Negative for arthralgias, back pain, gait problem, joint swelling and myalgias  Skin: Negative for rash and wound  Neurological: Negative for dizziness, tremors, seizures, speech difficulty, light-headedness, numbness and headaches  Hematological: Negative for adenopathy  Does not bruise/bleed easily  Psychiatric/Behavioral: Negative for agitation, behavioral problems and confusion  The patient is not nervous/anxious  Past Medical History:   Diagnosis Date    Renal disorder       Past Surgical History:   Procedure Laterality Date    CHOLECYSTECTOMY       Social History     Socioeconomic History    Marital status: Single     Spouse name: Not on file    Number of children: Not on file    Years of education: Not on file    Highest education level: Not on file   Occupational History    Not on file   Social Needs    Financial resource strain: Not on file    Food insecurity:     Worry: Not on file     Inability: Not on file    Transportation needs:     Medical: Not on file     Non-medical: Not on file   Tobacco Use    Smoking status: Light Tobacco Smoker    Smokeless tobacco: Never Used    Tobacco comment: social    Substance and Sexual Activity    Alcohol use: Yes     Frequency: 2-4 times a month    Drug use: Never    Sexual activity: Yes     Partners: Male     Birth control/protection: None   Lifestyle    Physical activity:     Days per week: Not on file     Minutes per session: Not on file    Stress: Not on file   Relationships    Social connections:     Talks on phone: Not on file     Gets together: Not on file     Attends Yazidi service: Not on file     Active member of club or organization: Not on file     Attends meetings of clubs or organizations: Not on file     Relationship status: Not on file    Intimate partner violence:     Fear of current or ex partner: Not on file     Emotionally abused: Not on file     Physically abused: Not on file     Forced sexual activity: Not on file   Other Topics Concern    Not on file   Social History Narrative    Not on file     Family History   Problem Relation Age of Onset    Arthritis Mother     Heart attack Father     Coronary artery disease Father     Glaucoma Father      Patient has no known allergies    Current Outpatient Medications Medication Sig Dispense Refill    pantoprazole (PROTONIX) 40 mg tablet Take 1 tablet (40 mg total) by mouth daily 30 tablet 1    Na Sulfate-K Sulfate-Mg Sulf (Suprep Bowel Prep Kit) 17 5-3 13-1 6 GM/177ML SOLN Take 2 Bottles by mouth see administration instructions Please follow the instructions from the office 2 Bottle 0     No current facility-administered medications for this visit  Blood pressure 114/60, pulse 76, temperature 97 6 °F (36 4 °C), resp  rate 18, height 4' 11" (1 499 m), weight 66 2 kg (146 lb)  PHYSICAL EXAM: Physical Exam   Constitutional: She is oriented to person, place, and time  She appears well-developed and well-nourished  HENT:   Head: Normocephalic and atraumatic  Nose: Nose normal    Mouth/Throat: Oropharynx is clear and moist    Eyes: Conjunctivae are normal  Right eye exhibits no discharge  Left eye exhibits no discharge  No scleral icterus  Neck: Neck supple  No JVD present  No tracheal deviation present  No thyromegaly present  Cardiovascular: Normal rate, regular rhythm and normal heart sounds  Exam reveals no gallop and no friction rub  No murmur heard  Pulmonary/Chest: Effort normal and breath sounds normal  No respiratory distress  She has no wheezes  She has no rales  She exhibits no tenderness  Abdominal: Soft  Bowel sounds are normal  She exhibits no distension and no mass  There is no tenderness  There is no rebound and no guarding  No hernia  Musculoskeletal: She exhibits no edema, tenderness or deformity  Lymphadenopathy:     She has no cervical adenopathy  Neurological: She is alert and oriented to person, place, and time  Skin: Skin is warm and dry  No rash noted  No erythema  Psychiatric: She has a normal mood and affect   Her behavior is normal  Thought content normal         Lab Results   Component Value Date    WBC 9 32 02/11/2020    HGB 12 3 02/11/2020    HCT 38 0 02/11/2020    MCV 90 02/11/2020     02/11/2020     Lab Results Component Value Date    CALCIUM 8 9 02/11/2020    K 3 3 (L) 02/11/2020    CO2 30 02/11/2020     02/11/2020    BUN 13 02/11/2020    CREATININE 0 84 02/11/2020     Lab Results   Component Value Date    ALT 24 02/11/2020    AST 18 02/11/2020    ALKPHOS 89 02/11/2020     No results found for: INR, PROTIME    Ct Abdomen Pelvis With Contrast    Result Date: 2/12/2020  Impression: 1  Mild thickening of the urinary bladder wall, correlate with urinalysis for cystitis  2   Other findings as above  Workstation performed: [id number]       ASSESSMENT & PLAN:    Pain of upper abdomen  Nonspecific abdominal pain that has improved on Protonix  Rule out peptic ulcer disease or gastric erosions  Also consider H pylori gastritis  Rule out biliary pathology     -continue Protonix for now    -Patient was explained about the lifestyle and dietary modifications  Advised to avoid fatty foods, chocolates, caffeine, alcohol and any other triggering foods  Avoid eating for at least 3 hours before going to bed  -schedule for EGD    Screen for colon cancer  Screening for colon cancer - patient is at average risk for colon cancer screening  Rule out colorectal lesions including polyps or malignancy         -Schedule for colonoscopy  -High-fiber diet     -Patient was given instructions about the colonoscopy prep     -Patient was explained about  the risks and benefits of the procedure  Risks including but not limited to bleeding, infection, perforation were explained in detail  Also explained about less than 100% sensitivity with the exam and other alternatives            Liver cyst  Septated cyst in the liver that has increased in size from before     -schedule for MRI liver and then consider referral to surgery

## 2020-03-11 NOTE — ASSESSMENT & PLAN NOTE
Nonspecific abdominal pain that has improved on Protonix  Rule out peptic ulcer disease or gastric erosions  Also consider H pylori gastritis  Rule out biliary pathology     -continue Protonix for now    -Patient was explained about the lifestyle and dietary modifications  Advised to avoid fatty foods, chocolates, caffeine, alcohol and any other triggering foods  Avoid eating for at least 3 hours before going to bed          -schedule for EGD

## 2020-03-11 NOTE — ASSESSMENT & PLAN NOTE
Septated cyst in the liver that has increased in size from before     -schedule for MRI liver and then consider referral to surgery

## 2020-03-16 ENCOUNTER — OFFICE VISIT (OUTPATIENT)
Dept: OBGYN CLINIC | Facility: MEDICAL CENTER | Age: 60
End: 2020-03-16

## 2020-03-16 VITALS
HEIGHT: 59 IN | SYSTOLIC BLOOD PRESSURE: 102 MMHG | WEIGHT: 145.1 LBS | BODY MASS INDEX: 29.25 KG/M2 | DIASTOLIC BLOOD PRESSURE: 70 MMHG

## 2020-03-16 DIAGNOSIS — Z01.419 ENCOUNTER FOR ROUTINE GYNECOLOGICAL EXAMINATION WITH PAPANICOLAOU SMEAR OF CERVIX: Primary | ICD-10-CM

## 2020-03-16 DIAGNOSIS — Z12.31 ENCOUNTER FOR SCREENING MAMMOGRAM FOR MALIGNANT NEOPLASM OF BREAST: ICD-10-CM

## 2020-03-16 DIAGNOSIS — R32 URINARY INCONTINENCE IN FEMALE: ICD-10-CM

## 2020-03-16 DIAGNOSIS — N81.10 FEMALE CYSTOCELE: ICD-10-CM

## 2020-03-16 PROCEDURE — 99386 PREV VISIT NEW AGE 40-64: CPT | Performed by: OBSTETRICS & GYNECOLOGY

## 2020-03-16 RX ORDER — COLLAGEN, HYDROLYSATE (BOVINE) 100 %
POWDER (GRAM) MISCELLANEOUS
COMMUNITY

## 2020-03-16 RX ORDER — MELATONIN
1000 DAILY
COMMUNITY

## 2020-03-16 NOTE — PROGRESS NOTES
ASSESSMENT & PLAN: Josue Littlejohn is a 63 y o  [id number] with normal gynecologic exam     1   Routine well woman exam done today  2  Pap and HPV:  The patient's last pap and hpv was maybe 2 years ago no records   It was normal     Pap with cotesting was done today  Current ASCCP Guidelines reviewed  3   Mammogram ordered  4  Colorectal cancer screening was not ordered  5  The following were reviewed in today's visit: breast self exam, mammography screening ordered, menopause, exercise and healthy diet  6  Cystocele - urogyn referral    CC: Annual Gynecologic Examination    HPI: Josue Littlejohn is a 63 y o  [id number] who presents for annual gynecologic examination  She has the following concerns:  Urinary incontinence that is getting worse , uses daily pads     Health Maintenance:    She wears her seatbelt routinely  She does perform regular monthly self breast exams  She feels safe at home       Past Medical History:   Diagnosis Date    Renal disorder        Past Surgical History:   Procedure Laterality Date    CHOLECYSTECTOMY      TUBAL LIGATION      at 29        Past OB/Gyn History:  OB History        5    Para   3    Term   3            AB   2    Living   3       SAB        TAB        Ectopic        Multiple        Live Births   3                 Family History   Problem Relation Age of Onset    Arthritis Mother     Heart attack Father     Coronary artery disease Father     Glaucoma Father        Social History:  Social History     Socioeconomic History    Marital status: Single     Spouse name: Not on file    Number of children: Not on file    Years of education: Not on file    Highest education level: Not on file   Occupational History    Not on file   Social Needs    Financial resource strain: Not on file    Food insecurity:     Worry: Not on file     Inability: Not on file    Transportation needs:     Medical: Not on file     Non-medical: Not on file   Tobacco Use  Smoking status: Never Smoker    Smokeless tobacco: Never Used    Tobacco comment: social    Substance and Sexual Activity    Alcohol use: Not Currently    Drug use: Never    Sexual activity: Yes     Partners: Male     Birth control/protection: Post-menopausal   Lifestyle    Physical activity:     Days per week: Not on file     Minutes per session: Not on file    Stress: Not on file   Relationships    Social connections:     Talks on phone: Not on file     Gets together: Not on file     Attends Lutheran service: Not on file     Active member of club or organization: Not on file     Attends meetings of clubs or organizations: Not on file     Relationship status: Not on file    Intimate partner violence:     Fear of current or ex partner: Not on file     Emotionally abused: Not on file     Physically abused: Not on file     Forced sexual activity: Not on file   Other Topics Concern    Not on file   Social History Narrative    Not on file       No Known Allergies    Current Outpatient Medications:     cholecalciferol (VITAMIN D3) 1,000 units tablet, Take 1,000 Units by mouth daily, Disp: , Rfl:     Collagen Hydrolysate POWD, by Does not apply route, Disp: , Rfl:     cyanocobalamin ([employer] Vitamin B-12) 1000 MCG tablet, Take 1,000 mcg by mouth daily, Disp: , Rfl:     Na Sulfate-K Sulfate-Mg Sulf (Suprep Bowel Prep Kit) 17 5-3 13-1 6 GM/177ML SOLN, Take 2 Bottles by mouth see administration instructions Please follow the instructions from the office, Disp: 2 Bottle, Rfl: 0    pantoprazole (PROTONIX) 40 mg tablet, Take 1 tablet (40 mg total) by mouth daily, Disp: 30 tablet, Rfl: 1      Review of Systems  Constitutional :no fever, feels well, no tiredness, no recent weight gain or loss  ENT: no ear ache, no loss of hearing, no nosebleeds or nasal discharge, no sore throat or hoarseness    Cardiovascular: no complaints of slow or fast heart beat, no chest pain, no palpitations, no leg claudication or lower extremity edema  Respiratory: no complaints of shortness of shortness of breath, no STEVE  Breasts:no complaints of breast pain, breast lump, or nipple discharge  Gastrointestinal: no complaints of abdominal pain, constipation, nausea, vomiting, or diarrhea or bloody stools  Genitourinary : as noted in HPI  Musculoskeletal: no complaints of arthralgia, no myalgia, no joint swelling or stiffness, no limb pain or swelling  Integumentary: no complaints of skin rash or lesion, itching or dry skin  Neurological: no complaints of headache, no confusion, no numbness or tingling, no dizziness or fainting    Objective      /70   Ht 4' 11" (1 499 m)   Wt 65 8 kg (145 lb 1 6 oz)   BMI 29 31 kg/m²     General:   appears stated age, cooperative, alert normal mood and affect   Neck: normal, supple,trachea midline, no masses   Heart: regular rate and rhythm, S1, S2 normal, no murmur, click, rub or gallop   Lungs: clear to auscultation bilaterally   Breasts: normal appearance, no masses or tenderness   Abdomen: soft, non-tender, without masses or organomegaly   Vulva: normal female genitalia   Vagina: vagina positive for cystocele    Urethra: normal   Cervix: Normal, no discharge  Nontender     Uterus: normal size, contour, position, consistency, mobility, non-tender   Adnexa: no mass, fullness, tenderness   Psychiatric orientation to person, place, and time: normal  mood and affect: normal

## 2020-03-18 NOTE — PRE-PROCEDURE INSTRUCTIONS
Pre-Surgery Instructions:   Medication Instructions    cholecalciferol (VITAMIN D3) 1,000 units tablet Patient was instructed by Physician and understands   Collagen Hydrolysate POWD Patient was instructed by Physician and understands   cyanocobalamin ([employer] Vitamin B-12) 1000 MCG tablet Patient was instructed by Physician and understands   Na Sulfate-K Sulfate-Mg Sulf (Suprep Bowel Prep Kit) 17 5-3 13-1 6 GM/177ML SOLN Patient was instructed by Physician and understands   pantoprazole (PROTONIX) 40 mg tablet Patient was instructed by Physician and understands  Pt to follow Dr Mani Rodriguez instructions    daughter Pranay Padilla

## 2020-03-19 LAB
CLINICAL INFO: ABNORMAL
CYTO CVX: ABNORMAL
DATE PREVIOUS BX: ABNORMAL
GEN CATEG CVX/VAG CYTO-IMP: ABNORMAL
HPV E6+E7 MRNA CVX QL NAA+PROBE: DETECTED
LMP START DATE: ABNORMAL
SL AMB PREV. PAP:: ABNORMAL
SPECIMEN SOURCE CVX/VAG CYTO: ABNORMAL

## 2020-03-20 ENCOUNTER — ANESTHESIA EVENT (OUTPATIENT)
Dept: GASTROENTEROLOGY | Facility: AMBULARY SURGERY CENTER | Age: 60
End: 2020-03-20

## 2020-03-22 NOTE — ANESTHESIA PREPROCEDURE EVALUATION
Review of Systems/Medical History  Patient summary reviewed  Chart reviewed  No history of anesthetic complications     Cardiovascular   Pulmonary       GI/Hepatic    GERD , Liver disease (cyst) ,             Endo/Other     GYN      Comment: S/p tubal     Hematology   Musculoskeletal       Neurology   Psychology           Physical Exam    Airway    Mallampati score: III  TM Distance: >3 FB  Neck ROM: full     Dental       Cardiovascular  Rhythm: regular, Rate: normal,     Pulmonary  Breath sounds clear to auscultation,     Other Findings  Bilateral pterygium in eyes      Anesthesia Plan  ASA Score- 2     Anesthesia Type- IV sedation with anesthesia with ASA Monitors  Additional Monitors:   Airway Plan:         Plan Factors-    Induction- intravenous  Postoperative Plan-     Informed Consent- Anesthetic plan and risks discussed with patient  I personally reviewed this patient with the CRNA  Discussed and agreed on the Anesthesia Plan with the CRNA  Harsh Lincoln

## 2020-03-23 ENCOUNTER — ANESTHESIA (OUTPATIENT)
Dept: GASTROENTEROLOGY | Facility: AMBULARY SURGERY CENTER | Age: 60
End: 2020-03-23

## 2020-03-23 ENCOUNTER — HOSPITAL ENCOUNTER (OUTPATIENT)
Dept: GASTROENTEROLOGY | Facility: AMBULARY SURGERY CENTER | Age: 60
Setting detail: OUTPATIENT SURGERY
Discharge: HOME | End: 2020-03-23
Attending: INTERNAL MEDICINE | Admitting: INTERNAL MEDICINE

## 2020-03-23 VITALS
OXYGEN SATURATION: 99 % | DIASTOLIC BLOOD PRESSURE: 69 MMHG | SYSTOLIC BLOOD PRESSURE: 123 MMHG | BODY MASS INDEX: 29.23 KG/M2 | WEIGHT: 145 LBS | TEMPERATURE: 97 F | RESPIRATION RATE: 16 BRPM | HEART RATE: 49 BPM | HEIGHT: 59 IN

## 2020-03-23 DIAGNOSIS — R10.10 PAIN OF UPPER ABDOMEN: ICD-10-CM

## 2020-03-23 DIAGNOSIS — Z12.11 SCREEN FOR COLON CANCER: ICD-10-CM

## 2020-03-23 PROCEDURE — 88305 TISSUE EXAM BY PATHOLOGIST: CPT | Performed by: PATHOLOGY

## 2020-03-23 PROCEDURE — G0121 COLON CA SCRN NOT HI RSK IND: HCPCS | Performed by: INTERNAL MEDICINE

## 2020-03-23 PROCEDURE — 43239 EGD BIOPSY SINGLE/MULTIPLE: CPT | Performed by: INTERNAL MEDICINE

## 2020-03-23 PROCEDURE — NC001 PR NO CHARGE: Performed by: INTERNAL MEDICINE

## 2020-03-23 RX ORDER — PROPOFOL 10 MG/ML
INJECTION, EMULSION INTRAVENOUS AS NEEDED
Status: DISCONTINUED | OUTPATIENT
Start: 2020-03-23 | End: 2020-03-23 | Stop reason: SURG

## 2020-03-23 RX ORDER — SODIUM CHLORIDE 9 MG/ML
75 INJECTION, SOLUTION INTRAVENOUS CONTINUOUS
Status: DISCONTINUED | OUTPATIENT
Start: 2020-03-23 | End: 2020-03-27 | Stop reason: HOSPADM

## 2020-03-23 RX ORDER — LIDOCAINE HYDROCHLORIDE 10 MG/ML
INJECTION, SOLUTION EPIDURAL; INFILTRATION; INTRACAUDAL; PERINEURAL AS NEEDED
Status: DISCONTINUED | OUTPATIENT
Start: 2020-03-23 | End: 2020-03-23 | Stop reason: SURG

## 2020-03-23 RX ADMIN — SODIUM CHLORIDE 75 ML/HR: 0.9 INJECTION, SOLUTION INTRAVENOUS at 09:05

## 2020-03-23 RX ADMIN — PROPOFOL 50 MG: 10 INJECTION, EMULSION INTRAVENOUS at 10:21

## 2020-03-23 RX ADMIN — LIDOCAINE HYDROCHLORIDE 50 MG: 10 INJECTION, SOLUTION EPIDURAL; INFILTRATION; INTRACAUDAL; PERINEURAL at 10:16

## 2020-03-23 RX ADMIN — PROPOFOL 70 MG: 10 INJECTION, EMULSION INTRAVENOUS at 10:16

## 2020-03-23 RX ADMIN — PROPOFOL 50 MG: 10 INJECTION, EMULSION INTRAVENOUS at 10:18

## 2020-03-23 NOTE — INTERVAL H&P NOTE
H&P reviewed  After examining the patient I find no changes in the patients condition since the H&P had been written      Vitals:    03/23/20 0850   BP: 132/63   Pulse: (!) 54   Resp: 18   Temp: (!) 97 °F (36 1 °C)   SpO2: 98%

## 2020-03-23 NOTE — ANESTHESIA POSTPROCEDURE EVALUATION
Post-Op Assessment Note    CV Status:  Stable  Pain Score: 0    Pain management: adequate     Mental Status:  Awake   Hydration Status:  Stable   PONV Controlled:  None   Airway Patency:  Patent   Post Op Vitals Reviewed: Yes      Staff: CRNA   Comments: spontaneously breathing, HOB @ 30 degrees, awake & comfortable, fully endorsed to recovery w/o AC          BP   123/67   Temp     Pulse 72   Resp   14   SpO2   100

## 2020-03-25 ENCOUNTER — TELEMEDICINE (OUTPATIENT)
Dept: FAMILY MEDICINE CLINIC | Facility: CLINIC | Age: 60
End: 2020-03-25

## 2020-03-25 DIAGNOSIS — F32.A DEPRESSION, UNSPECIFIED DEPRESSION TYPE: ICD-10-CM

## 2020-03-25 DIAGNOSIS — E87.6 HYPOKALEMIA: ICD-10-CM

## 2020-03-25 DIAGNOSIS — R10.13 EPIGASTRIC PAIN: Primary | ICD-10-CM

## 2020-03-25 PROCEDURE — 99442 PR PHYS/QHP TELEPHONE EVALUATION 11-20 MIN: CPT | Performed by: PHYSICIAN ASSISTANT

## 2020-03-25 NOTE — ASSESSMENT & PLAN NOTE
Continue Protonix 40 mg daily  Advised to follow a GERD free diet    Advised to avoid tomato based foods, fried fatty foods, acidic foods, chocolate, caffeine, alcohol, and peppermint

## 2020-03-25 NOTE — PROGRESS NOTES
Virtual Regular Visit    Problem List Items Addressed This Visit        Other    Epigastric pain - Primary     Continue Protonix 40 mg daily  Advised to follow a GERD free diet  Advised to avoid tomato based foods, fried fatty foods, acidic foods, chocolate, caffeine, alcohol, and peppermint         Hypokalemia     Last K+ 3 8 2/27/2020         Depression     Doing well  Feels depression has resolved                      Reason for visit is 4 week f/u epi epigastric pain    Encounter provider [address], [facility]    Provider located at [facility] [address] [facility] [address] [facility]   [phone]      Recent Visits  No visits were found meeting these conditions  Showing recent visits within past 7 days and meeting all other requirements     Future Appointments  No visits were found meeting these conditions  Showing future appointments within next 150 days and meeting all other requirements        After connecting through [employer], the patient was identified by name and date of birth  Arleen Ricardo was informed that this is a telemedicine visit and that the visit is being conducted through telephone which may not be secure and therefore, might not be HIPAA-compliant  My office door was closed  No one else was in the room  She acknowledged consent and understanding of privacy and security of the video platform  The patient has agreed to participate and understands they can discontinue the visit at any time  Subjective  Arleen Ricardo is a 63 y o  female with a past medical history of epigastric pain and hypokalemia  She was seen by GI on 3/11/2020, advised to continue Protonix 40 mg daily, EGD, colonoscopy, and cyst on liver- MRI of liver ordered  She reports her epigastric pain has improved  Had a flare after eating pizza a few days ago  Continues to take Protonix 40 mg daily    EGD findings- chronic gastritis   Colonoscopy- normal repeat in 10 years  MRI liver -cyst scheduled for 3/30/2020  Had PAP completed on 3/16/2020- ASC-US HPV+  Pt has colposcopy scheduled for 4/3/2020  Hypokalemia-labs drawn on 2/27/2020- K=3 8    Depression- is no longer feeling down or depressed  Is doing well  No SI/HI    Past Medical History:   Diagnosis Date    Abdominal pain     "burning pain" came to the ED    Renal disorder     Wears dentures     permanent upper denture    Weight loss     20 lb wt loss in 2-3 weeks d/t stomach issues       Past Surgical History:   Procedure Laterality Date    CHOLECYSTECTOMY      TUBAL LIGATION      at 29        Current Outpatient Medications   Medication Sig Dispense Refill    cholecalciferol (VITAMIN D3) 1,000 units tablet Take 1,000 Units by mouth daily      Collagen Hydrolysate POWD by Does not apply route Powder in water      cyanocobalamin ([employer] Vitamin B-12) 1000 MCG tablet Take 1,000 mcg by mouth daily      Na Sulfate-K Sulfate-Mg Sulf (Suprep Bowel Prep Kit) 17 5-3 13-1 6 GM/177ML SOLN Take 2 Bottles by mouth see administration instructions Please follow the instructions from the office 2 Bottle 0    pantoprazole (PROTONIX) 40 mg tablet Take 1 tablet (40 mg total) by mouth daily 30 tablet 1     No current facility-administered medications for this visit  Facility-Administered Medications Ordered in Other Visits   Medication Dose Route Frequency Provider Last Rate Last Dose    sodium chloride 0 9 % infusion  75 mL/hr Intravenous Continuous Shruti Oconnell MD   Stopped at 03/23/20 1031        No Known Allergies    Review of Systems   Constitutional: Negative for chills, fatigue and fever  Respiratory: Negative for cough, chest tightness, shortness of breath and wheezing  Cardiovascular: Negative for chest pain and palpitations  Gastrointestinal: Negative for abdominal pain, constipation, diarrhea, nausea and vomiting  Heartburn   Genitourinary: Negative for difficulty urinating  Musculoskeletal: Negative for arthralgias, myalgias, neck pain and neck stiffness  Skin: Negative for rash and wound  Neurological: Negative for dizziness, weakness, light-headedness, numbness and headaches  Psychiatric/Behavioral: Negative for agitation and behavioral problems  The patient is not nervous/anxious  I spent 20 minutes with the patient during this visit

## 2020-03-30 ENCOUNTER — HOSPITAL ENCOUNTER (OUTPATIENT)
Dept: RADIOLOGY | Facility: HOSPITAL | Age: 60
Discharge: HOME | End: 2020-03-30
Attending: INTERNAL MEDICINE

## 2020-03-30 DIAGNOSIS — K76.89 LIVER CYST: ICD-10-CM

## 2020-03-30 PROCEDURE — A9585 GADOBUTROL INJECTION: HCPCS | Performed by: INTERNAL MEDICINE

## 2020-03-30 PROCEDURE — 74183 MRI ABD W/O CNTR FLWD CNTR: CPT

## 2020-03-30 RX ADMIN — GADOBUTROL 6.5 ML: 604.72 INJECTION INTRAVENOUS at 11:25

## 2020-03-31 DIAGNOSIS — K76.89 LIVER CYST: Primary | ICD-10-CM

## 2020-04-03 ENCOUNTER — PROCEDURE VISIT (OUTPATIENT)
Dept: OBGYN CLINIC | Facility: MEDICAL CENTER | Age: 60
End: 2020-04-03

## 2020-04-03 VITALS
BODY MASS INDEX: 29.33 KG/M2 | HEIGHT: 59 IN | WEIGHT: 145.5 LBS | SYSTOLIC BLOOD PRESSURE: 120 MMHG | DIASTOLIC BLOOD PRESSURE: 80 MMHG

## 2020-04-03 DIAGNOSIS — R87.610 ASCUS WITH POSITIVE HIGH RISK HPV CERVICAL: Primary | ICD-10-CM

## 2020-04-03 DIAGNOSIS — R87.810 ASCUS WITH POSITIVE HIGH RISK HPV CERVICAL: Primary | ICD-10-CM

## 2020-04-03 PROCEDURE — 3008F BODY MASS INDEX DOCD: CPT | Performed by: OBSTETRICS & GYNECOLOGY

## 2020-04-03 PROCEDURE — 3008F BODY MASS INDEX DOCD: CPT | Performed by: PHYSICIAN ASSISTANT

## 2020-04-03 PROCEDURE — 57454 BX/CURETT OF CERVIX W/SCOPE: CPT | Performed by: OBSTETRICS & GYNECOLOGY

## 2020-04-06 LAB
CLINICAL INFO: NORMAL
PATH REPORT.FINAL DX SPEC: NORMAL
PROCEDURE TYPE: NORMAL
PROCEDURE TYPE: NORMAL
SPECIMEN SOURCE: NORMAL
SPECIMEN SOURCE: NORMAL

## 2020-04-16 ENCOUNTER — TELEMEDICINE (OUTPATIENT)
Dept: OBGYN CLINIC | Facility: MEDICAL CENTER | Age: 60
End: 2020-04-16
Payer: COMMERCIAL

## 2020-04-16 VITALS — WEIGHT: 147 LBS | BODY MASS INDEX: 29.69 KG/M2

## 2020-04-16 DIAGNOSIS — R87.610 ATYPICAL SQUAMOUS CELLS OF UNDETERMINED SIGNIFICANCE ON CYTOLOGIC SMEAR OF CERVIX (ASC-US): Primary | ICD-10-CM

## 2020-04-16 PROBLEM — R87.619 ABNORMAL PAP SMEAR OF CERVIX: Status: ACTIVE | Noted: 2020-04-16

## 2020-04-16 PROCEDURE — 99441 PR PHYS/QHP TELEPHONE EVALUATION 5-10 MIN: CPT | Performed by: OBSTETRICS & GYNECOLOGY

## 2020-05-14 DIAGNOSIS — R10.13 EPIGASTRIC PAIN: ICD-10-CM

## 2020-05-15 RX ORDER — PANTOPRAZOLE SODIUM 40 MG/1
TABLET, DELAYED RELEASE ORAL
Qty: 30 TABLET | Refills: 1 | Status: SHIPPED | OUTPATIENT
Start: 2020-05-15

## 2021-03-15 ENCOUNTER — DOCUMENTATION (OUTPATIENT)
Dept: OBGYN CLINIC | Facility: MEDICAL CENTER | Age: 61
End: 2021-03-15

## 2021-03-25 ENCOUNTER — IMMUNIZATIONS (OUTPATIENT)
Dept: FAMILY MEDICINE CLINIC | Facility: HOSPITAL | Age: 61
End: 2021-03-25

## 2021-03-25 DIAGNOSIS — Z23 ENCOUNTER FOR IMMUNIZATION: Primary | ICD-10-CM

## 2021-03-25 PROCEDURE — 0001A SARS-COV-2 / COVID-19 MRNA VACCINE (PFIZER-BIONTECH) 30 MCG: CPT

## 2021-03-25 PROCEDURE — 91300 SARS-COV-2 / COVID-19 MRNA VACCINE (PFIZER-BIONTECH) 30 MCG: CPT

## 2021-04-18 ENCOUNTER — IMMUNIZATIONS (OUTPATIENT)
Dept: FAMILY MEDICINE CLINIC | Facility: HOSPITAL | Age: 61
End: 2021-04-18

## 2021-04-18 DIAGNOSIS — Z23 ENCOUNTER FOR IMMUNIZATION: Primary | ICD-10-CM

## 2021-04-18 PROCEDURE — 0002A SARS-COV-2 / COVID-19 MRNA VACCINE (PFIZER-BIONTECH) 30 MCG: CPT | Performed by: OBSTETRICS & GYNECOLOGY

## 2021-04-18 PROCEDURE — 91300 SARS-COV-2 / COVID-19 MRNA VACCINE (PFIZER-BIONTECH) 30 MCG: CPT | Performed by: OBSTETRICS & GYNECOLOGY

## 2021-10-22 ENCOUNTER — TELEPHONE (OUTPATIENT)
Dept: GASTROENTEROLOGY | Facility: AMBULARY SURGERY CENTER | Age: 61
End: 2021-10-22

## 2021-10-22 NOTE — TELEPHONE ENCOUNTER
Received voicemail on result line from [address] [facility] requesting pt's colonoscopy/egd procedure note and pathology report be faxed to [phone]  Faxed over reports

## 2021-10-30 ENCOUNTER — APPOINTMENT (OUTPATIENT)
Dept: LAB | Facility: HOSPITAL | Age: 61
End: 2021-10-30
Payer: COMMERCIAL

## 2021-10-30 DIAGNOSIS — Z13.6 SCREENING FOR ISCHEMIC HEART DISEASE: ICD-10-CM

## 2021-10-30 LAB
ALBUMIN SERPL BCP-MCNC: 3.6 G/DL (ref 3.5–5)
ALP SERPL-CCNC: 90 U/L (ref 46–116)
ALT SERPL W P-5'-P-CCNC: 36 U/L (ref 12–78)
ANION GAP SERPL CALCULATED.3IONS-SCNC: 7 MMOL/L (ref 4–13)
AST SERPL W P-5'-P-CCNC: 21 U/L (ref 5–45)
BILIRUB SERPL-MCNC: 0.34 MG/DL (ref 0.2–1)
BUN SERPL-MCNC: 20 MG/DL (ref 5–25)
CALCIUM SERPL-MCNC: 8.6 MG/DL (ref 8.3–10.1)
CHLORIDE SERPL-SCNC: 105 MMOL/L (ref 100–108)
CHOLEST SERPL-MCNC: 203 MG/DL (ref 50–200)
CO2 SERPL-SCNC: 27 MMOL/L (ref 21–32)
CREAT SERPL-MCNC: 0.65 MG/DL (ref 0.6–1.3)
GFR SERPL CREATININE-BSD FRML MDRD: 97 ML/MIN/1.73SQ M
GLUCOSE P FAST SERPL-MCNC: 93 MG/DL (ref 65–99)
HDLC SERPL-MCNC: 55 MG/DL
LDLC SERPL CALC-MCNC: 125 MG/DL (ref 0–100)
NONHDLC SERPL-MCNC: 148 MG/DL
POTASSIUM SERPL-SCNC: 3.9 MMOL/L (ref 3.5–5.3)
PROT SERPL-MCNC: 7.9 G/DL (ref 6.4–8.2)
SODIUM SERPL-SCNC: 139 MMOL/L (ref 136–145)
TRIGL SERPL-MCNC: 116 MG/DL

## 2021-10-30 PROCEDURE — 80061 LIPID PANEL: CPT

## 2021-10-30 PROCEDURE — 36415 COLL VENOUS BLD VENIPUNCTURE: CPT

## 2021-10-30 PROCEDURE — 80053 COMPREHEN METABOLIC PANEL: CPT

## 2022-01-22 ENCOUNTER — IMMUNIZATIONS (OUTPATIENT)
Dept: FAMILY MEDICINE CLINIC | Facility: HOSPITAL | Age: 62
End: 2022-01-22

## 2022-01-22 DIAGNOSIS — Z23 ENCOUNTER FOR IMMUNIZATION: Primary | ICD-10-CM

## 2022-01-22 PROCEDURE — 91300 COVID-19 PFIZER VACC 0.3 ML: CPT

## 2022-01-22 PROCEDURE — 0001A COVID-19 PFIZER VACC 0.3 ML: CPT

## 2023-05-10 ENCOUNTER — APPOINTMENT (EMERGENCY)
Dept: RADIOLOGY | Facility: HOSPITAL | Age: 63
End: 2023-05-10

## 2023-05-10 ENCOUNTER — HOSPITAL ENCOUNTER (INPATIENT)
Facility: HOSPITAL | Age: 63
LOS: 1 days | Discharge: HOME | End: 2023-05-11
Attending: EMERGENCY MEDICINE | Admitting: INTERNAL MEDICINE

## 2023-05-10 ENCOUNTER — APPOINTMENT (INPATIENT)
Dept: MRI IMAGING | Facility: HOSPITAL | Age: 63
End: 2023-05-10

## 2023-05-10 ENCOUNTER — APPOINTMENT (EMERGENCY)
Dept: CT IMAGING | Facility: HOSPITAL | Age: 63
End: 2023-05-10

## 2023-05-10 ENCOUNTER — APPOINTMENT (INPATIENT)
Dept: NON INVASIVE DIAGNOSTICS | Facility: HOSPITAL | Age: 63
End: 2023-05-10

## 2023-05-10 DIAGNOSIS — K76.89 LIVER CYST: ICD-10-CM

## 2023-05-10 DIAGNOSIS — I63.9 STROKE (HCC): Primary | ICD-10-CM

## 2023-05-10 DIAGNOSIS — Z23 ENCOUNTER FOR IMMUNIZATION: ICD-10-CM

## 2023-05-10 DIAGNOSIS — R87.610 ATYPICAL SQUAMOUS CELLS OF UNDETERMINED SIGNIFICANCE ON CYTOLOGIC SMEAR OF CERVIX (ASC-US): ICD-10-CM

## 2023-05-10 DIAGNOSIS — R29.90 STROKE-LIKE SYMPTOMS: ICD-10-CM

## 2023-05-10 DIAGNOSIS — R10.10 PAIN OF UPPER ABDOMEN: ICD-10-CM

## 2023-05-10 DIAGNOSIS — F32.A DEPRESSION, UNSPECIFIED DEPRESSION TYPE: ICD-10-CM

## 2023-05-10 DIAGNOSIS — E87.6 HYPOKALEMIA: ICD-10-CM

## 2023-05-10 DIAGNOSIS — R10.13 EPIGASTRIC PAIN: ICD-10-CM

## 2023-05-10 DIAGNOSIS — Z12.11 SCREEN FOR COLON CANCER: ICD-10-CM

## 2023-05-10 DIAGNOSIS — Z12.39 SCREENING FOR BREAST CANCER: ICD-10-CM

## 2023-05-10 DIAGNOSIS — R29.810 FACIAL DROOP: ICD-10-CM

## 2023-05-10 PROBLEM — E66.9 OBESITY: Status: ACTIVE | Noted: 2023-05-10

## 2023-05-10 PROBLEM — R51.9 HEADACHE: Status: ACTIVE | Noted: 2023-05-10

## 2023-05-10 LAB
2HR DELTA HS TROPONIN: 0 NG/L
4HR DELTA HS TROPONIN: 0 NG/L
ANION GAP SERPL CALCULATED.3IONS-SCNC: 8 MMOL/L (ref 4–13)
AORTIC ROOT: 3.1 CM
AORTIC VALVE MEAN VELOCITY: 10.5 M/S
APICAL FOUR CHAMBER EJECTION FRACTION: 64 %
APTT PPP: 29 SECONDS (ref 23–37)
ASCENDING AORTA: 3.3 CM
ATRIAL RATE: 73 BPM
ATRIAL RATE: 74 BPM
AV LVOT MEAN GRADIENT: 3 MMHG
AV LVOT PEAK GRADIENT: 5 MMHG
AV MEAN GRADIENT: 5 MMHG
AV PEAK GRADIENT: 9 MMHG
AV REGURGITATION PRESSURE HALF TIME: 591 MS
AV VELOCITY RATIO: 0.73
BUN SERPL-MCNC: 9 MG/DL (ref 5–25)
CALCIUM SERPL-MCNC: 9.5 MG/DL (ref 8.4–10.2)
CARDIAC TROPONIN I PNL SERPL HS: 4 NG/L
CHLORIDE SERPL-SCNC: 101 MMOL/L (ref 96–108)
CHOLEST SERPL-MCNC: 187 MG/DL
CO2 SERPL-SCNC: 30 MMOL/L (ref 21–32)
CREAT SERPL-MCNC: 0.59 MG/DL (ref 0.6–1.3)
DOP CALC AO PEAK VEL: 1.53 M/S
DOP CALC AO VTI: 29.86 CM
DOP CALC LVOT PEAK VEL VTI: 23.62 CM
DOP CALC LVOT PEAK VEL: 1.11 M/S
E WAVE DECELERATION TIME: 189 MS
ERYTHROCYTE [DISTWIDTH] IN BLOOD BY AUTOMATED COUNT: 13.5 % (ref 11.6–15.1)
EST. AVERAGE GLUCOSE BLD GHB EST-MCNC: 114 MG/DL
FLUAV RNA RESP QL NAA+PROBE: NEGATIVE
FLUBV RNA RESP QL NAA+PROBE: NEGATIVE
FRACTIONAL SHORTENING: 39 % (ref 28–44)
GFR SERPL CREATININE-BSD FRML MDRD: 98 ML/MIN/1.73SQ M
GLUCOSE SERPL-MCNC: 88 MG/DL (ref 65–140)
GLUCOSE SERPL-MCNC: 91 MG/DL (ref 65–140)
HBA1C MFR BLD: 5.6 %
HCT VFR BLD AUTO: 39.7 % (ref 34.8–46.1)
HDLC SERPL-MCNC: 51 MG/DL
HGB BLD-MCNC: 12.9 G/DL (ref 11.5–15.4)
INR PPP: 0.97 (ref 0.84–1.19)
INTERVENTRICULAR SEPTUM IN DIASTOLE (PARASTERNAL SHORT AXIS VIEW): 1.2 CM
INTERVENTRICULAR SEPTUM: 1.2 CM (ref 0.6–1.1)
LAAS-AP2: 19.7 CM2
LAAS-AP4: 13.9 CM2
LDLC SERPL CALC-MCNC: 80 MG/DL (ref 0–100)
LEFT ATRIUM SIZE: 4.3 CM
LEFT INTERNAL DIMENSION IN SYSTOLE: 2.3 CM (ref 2.1–4)
LEFT VENTRICULAR INTERNAL DIMENSION IN DIASTOLE: 3.8 CM (ref 3.5–6)
LEFT VENTRICULAR POSTERIOR WALL IN END DIASTOLE: 1 CM
LEFT VENTRICULAR STROKE VOLUME: 42 ML
LVSV (TEICH): 42 ML
MCH RBC QN AUTO: 29.1 PG (ref 26.8–34.3)
MCHC RBC AUTO-ENTMCNC: 32.5 G/DL (ref 31.4–37.4)
MCV RBC AUTO: 89 FL (ref 82–98)
MV E'TISSUE VEL-SEP: 7 CM/S
MV PEAK A VEL: 0.76 M/S
MV PEAK E VEL: 66 CM/S
MV STENOSIS PRESSURE HALF TIME: 55 MS
MV VALVE AREA P 1/2 METHOD: 4 CM2
P AXIS: 52 DEGREES
P AXIS: 65 DEGREES
PLATELET # BLD AUTO: 278 THOUSANDS/UL (ref 149–390)
PLATELET # BLD AUTO: 303 THOUSANDS/UL (ref 149–390)
PMV BLD AUTO: 10.2 FL (ref 8.9–12.7)
PMV BLD AUTO: 10.2 FL (ref 8.9–12.7)
POTASSIUM SERPL-SCNC: 3.7 MMOL/L (ref 3.5–5.3)
PR INTERVAL: 150 MS
PR INTERVAL: 150 MS
PROTHROMBIN TIME: 13.1 SECONDS (ref 11.6–14.5)
QRS AXIS: 47 DEGREES
QRS AXIS: 52 DEGREES
QRSD INTERVAL: 78 MS
QRSD INTERVAL: 78 MS
QT INTERVAL: 378 MS
QT INTERVAL: 378 MS
QTC INTERVAL: 416 MS
QTC INTERVAL: 419 MS
RBC # BLD AUTO: 4.44 MILLION/UL (ref 3.81–5.12)
RIGHT ATRIUM AREA SYSTOLE A4C: 18.1 CM2
RIGHT VENTRICLE ID DIMENSION: 4.1 CM
RSV RNA RESP QL NAA+PROBE: NEGATIVE
SARS-COV-2 RNA RESP QL NAA+PROBE: NEGATIVE
SL CV AV DECELERATION TIME RETROGRADE: 2038 MS
SL CV AV PEAK GRADIENT RETROGRADE: 60 MMHG
SL CV LEFT ATRIUM LENGTH A2C: 5.8 CM
SL CV LV EF: 55
SL CV PED ECHO LEFT VENTRICLE DIASTOLIC VOLUME (MOD BIPLANE) 2D: 60 ML
SL CV PED ECHO LEFT VENTRICLE SYSTOLIC VOLUME (MOD BIPLANE) 2D: 18 ML
SODIUM SERPL-SCNC: 139 MMOL/L (ref 135–147)
T WAVE AXIS: 51 DEGREES
T WAVE AXIS: 51 DEGREES
TRICUSPID ANNULAR PLANE SYSTOLIC EXCURSION: 2.2 CM
TRIGL SERPL-MCNC: 282 MG/DL
VENTRICULAR RATE: 73 BPM
VENTRICULAR RATE: 74 BPM
WBC # BLD AUTO: 8.15 THOUSAND/UL (ref 4.31–10.16)

## 2023-05-10 RX ORDER — KETOROLAC TROMETHAMINE 30 MG/ML
15 INJECTION, SOLUTION INTRAMUSCULAR; INTRAVENOUS ONCE
Status: COMPLETED | OUTPATIENT
Start: 2023-05-10 | End: 2023-05-10

## 2023-05-10 RX ORDER — CLOPIDOGREL BISULFATE 75 MG/1
180 TABLET ORAL DAILY
Qty: 20 TABLET | Refills: 0 | Status: SHIPPED | OUTPATIENT
Start: 2023-05-10 | End: 2023-05-11

## 2023-05-10 RX ORDER — ASPIRIN 81 MG/1
324 TABLET, CHEWABLE ORAL DAILY
Qty: 20 TABLET | Refills: 0 | Status: SHIPPED | OUTPATIENT
Start: 2023-05-10 | End: 2023-05-11

## 2023-05-10 RX ORDER — CLOPIDOGREL BISULFATE 75 MG/1
300 TABLET ORAL ONCE
Status: COMPLETED | OUTPATIENT
Start: 2023-05-10 | End: 2023-05-10

## 2023-05-10 RX ORDER — ACETAMINOPHEN 325 MG/1
650 TABLET ORAL EVERY 6 HOURS PRN
Status: DISCONTINUED | OUTPATIENT
Start: 2023-05-10 | End: 2023-05-11 | Stop reason: HOSPADM

## 2023-05-10 RX ORDER — DIPHENHYDRAMINE HYDROCHLORIDE 50 MG/ML
25 INJECTION INTRAMUSCULAR; INTRAVENOUS ONCE
Status: COMPLETED | OUTPATIENT
Start: 2023-05-10 | End: 2023-05-10

## 2023-05-10 RX ORDER — ENOXAPARIN SODIUM 100 MG/ML
40 INJECTION SUBCUTANEOUS EVERY 24 HOURS
Status: DISCONTINUED | OUTPATIENT
Start: 2023-05-10 | End: 2023-05-11 | Stop reason: HOSPADM

## 2023-05-10 RX ORDER — SENNOSIDES 8.6 MG
1 TABLET ORAL DAILY
Status: DISCONTINUED | OUTPATIENT
Start: 2023-05-10 | End: 2023-05-11 | Stop reason: HOSPADM

## 2023-05-10 RX ORDER — MELATONIN
1000 DAILY
Status: DISCONTINUED | OUTPATIENT
Start: 2023-05-10 | End: 2023-05-11 | Stop reason: HOSPADM

## 2023-05-10 RX ORDER — PANTOPRAZOLE SODIUM 40 MG/1
40 TABLET, DELAYED RELEASE ORAL
Status: DISCONTINUED | OUTPATIENT
Start: 2023-05-11 | End: 2023-05-11 | Stop reason: HOSPADM

## 2023-05-10 RX ORDER — ATORVASTATIN CALCIUM 40 MG/1
40 TABLET, FILM COATED ORAL
Status: DISCONTINUED | OUTPATIENT
Start: 2023-05-10 | End: 2023-05-11 | Stop reason: HOSPADM

## 2023-05-10 RX ORDER — KETOROLAC TROMETHAMINE 30 MG/ML
15 INJECTION, SOLUTION INTRAMUSCULAR; INTRAVENOUS EVERY 6 HOURS PRN
Status: DISCONTINUED | OUTPATIENT
Start: 2023-05-10 | End: 2023-05-11 | Stop reason: HOSPADM

## 2023-05-10 RX ORDER — CLOPIDOGREL BISULFATE 75 MG/1
75 TABLET ORAL DAILY
Status: DISCONTINUED | OUTPATIENT
Start: 2023-05-11 | End: 2023-05-11

## 2023-05-10 RX ORDER — METOCLOPRAMIDE HYDROCHLORIDE 5 MG/ML
10 INJECTION INTRAMUSCULAR; INTRAVENOUS ONCE
Status: COMPLETED | OUTPATIENT
Start: 2023-05-10 | End: 2023-05-10

## 2023-05-10 RX ADMIN — KETOROLAC TROMETHAMINE 15 MG: 30 INJECTION, SOLUTION INTRAMUSCULAR at 08:40

## 2023-05-10 RX ADMIN — CLOPIDOGREL BISULFATE 300 MG: 75 TABLET ORAL at 10:00

## 2023-05-10 RX ADMIN — KETOROLAC TROMETHAMINE 15 MG: 30 INJECTION, SOLUTION INTRAMUSCULAR; INTRAVENOUS at 21:07

## 2023-05-10 RX ADMIN — ASPIRIN 324 MG: 81 TABLET, COATED ORAL at 09:59

## 2023-05-10 RX ADMIN — GADOBUTROL 7 ML: 604.72 INJECTION INTRAVENOUS at 10:46

## 2023-05-10 RX ADMIN — Medication 1000 UNITS: at 11:54

## 2023-05-10 RX ADMIN — DIPHENHYDRAMINE HYDROCHLORIDE 25 MG: 50 INJECTION, SOLUTION INTRAMUSCULAR; INTRAVENOUS at 08:41

## 2023-05-10 RX ADMIN — IOHEXOL 85 ML: 350 INJECTION, SOLUTION INTRAVENOUS at 08:08

## 2023-05-10 RX ADMIN — METOCLOPRAMIDE 10 MG: 5 INJECTION, SOLUTION INTRAMUSCULAR; INTRAVENOUS at 08:40

## 2023-05-10 RX ADMIN — ATORVASTATIN CALCIUM 40 MG: 40 TABLET, FILM COATED ORAL at 17:06

## 2023-05-10 RX ADMIN — CYANOCOBALAMIN TAB 500 MCG 1000 MCG: 500 TAB at 12:03

## 2023-05-10 RX ADMIN — ENOXAPARIN SODIUM 40 MG: 40 INJECTION SUBCUTANEOUS at 11:54

## 2023-05-10 RX ADMIN — SENNOSIDES 8.6 MG: 8.6 TABLET, FILM COATED ORAL at 11:54

## 2023-05-10 NOTE — ED PROVIDER NOTES
"History  Chief Complaint   Patient presents with   • Facial Droop     Pt woke up around 0100 with right sided facial drop  Tingling in bilateral hands and right arm and headache in occipital portion of head     HPI   Patient is a 28-year-old female with history of GERD, otherwise noncontributory presenting with right-sided facial droop that she noticed began around 1 in the morning  She states that she first noticed it when she was getting ready to go to work and tried to drink a glass of water but noticed that the water was spilling out of the side of her mouth  She also is noting a tingling sensation down the right arm and in bilateral hands  Otherwise, not reporting any weakness  She says never happened to her before  She feels overall since onset symptoms have somewhat improved  Patient went to bed at approximately 11:45 PM last evening, this was last known normal       Prior to Admission Medications   Prescriptions Last Dose Informant Patient Reported? Taking?    Collagen Hydrolysate POWD Past Week  Yes Yes   Sig: by Does not apply route Powder in water   Na Sulfate-K Sulfate-Mg Sulf (Suprep Bowel Prep Kit) 17 5-3 13-1 6 GM/177ML SOLN Past Month  No Yes   Sig: Take 2 Bottles by mouth see administration instructions Please follow the instructions from the office   cholecalciferol (VITAMIN D3) 1,000 units tablet Past Week  Yes Yes   Sig: Take 1,000 Units by mouth daily   cyanocobalamin (VITAMIN B-12) 1000 MCG tablet Past Week  Yes Yes   Sig: Take 1,000 mcg by mouth daily      Facility-Administered Medications: None       Past Medical History:   Diagnosis Date   • Abdominal pain     \"burning pain\" came to the ED   • Renal disorder    • Wears dentures     permanent upper denture   • Weight loss     20 lb wt loss in 2-3 weeks d/t stomach issues       Past Surgical History:   Procedure Laterality Date   • CHOLECYSTECTOMY     • TUBAL LIGATION      at 29        Family History   Problem Relation Age of Onset   • " Arthritis Mother    • Heart attack Father    • Coronary artery disease Father    • Glaucoma Father    • Heart disease Father      I have reviewed and agree with the history as documented  E-Cigarette/Vaping   • E-Cigarette Use Never User      E-Cigarette/Vaping Substances     Social History     Tobacco Use   • Smoking status: Never   • Smokeless tobacco: Never   • Tobacco comments:     social    Vaping Use   • Vaping Use: Never used   Substance Use Topics   • Alcohol use: Not Currently   • Drug use: Never        Review of Systems   Constitutional: Negative for chills and fever  HENT: Negative for ear pain and sore throat  Eyes: Negative for pain and visual disturbance  Respiratory: Negative for cough and shortness of breath  Cardiovascular: Negative for chest pain and palpitations  Gastrointestinal: Negative for abdominal pain and vomiting  Genitourinary: Negative for dysuria and hematuria  Musculoskeletal: Negative for arthralgias and back pain  Skin: Negative for color change and rash  Neurological: Positive for facial asymmetry and numbness  Negative for seizures and syncope  All other systems reviewed and are negative  Physical Exam  ED Triage Vitals   Temperature Pulse Respirations Blood Pressure SpO2   05/10/23 0736 05/10/23 0735 05/10/23 0735 05/10/23 0735 05/10/23 0735   (!) 97 3 °F (36 3 °C) 76 18 (!) 191/84 99 %      Temp Source Heart Rate Source Patient Position - Orthostatic VS BP Location FiO2 (%)   05/10/23 0736 05/10/23 0735 05/10/23 0735 05/10/23 0736 --   Oral Monitor Sitting Right arm       Pain Score       05/10/23 0815       8             Orthostatic Vital Signs  Vitals:    05/10/23 1300 05/10/23 1338 05/10/23 1414 05/10/23 1515   BP: 141/76 128/66 128/66 125/58   Pulse:  72 72 73   Patient Position - Orthostatic VS:   Lying Lying       Physical Exam  Vitals and nursing note reviewed  Constitutional:       General: She is not in acute distress       Appearance: She is well-developed  HENT:      Head: Normocephalic and atraumatic  Eyes:      Conjunctiva/sclera: Conjunctivae normal    Cardiovascular:      Rate and Rhythm: Normal rate and regular rhythm  Heart sounds: No murmur heard  Pulmonary:      Effort: Pulmonary effort is normal  No respiratory distress  Breath sounds: Normal breath sounds  Abdominal:      Palpations: Abdomen is soft  Tenderness: There is no abdominal tenderness  Musculoskeletal:         General: No swelling  Cervical back: Neck supple  Skin:     General: Skin is warm and dry  Capillary Refill: Capillary refill takes less than 2 seconds  Neurological:      Mental Status: She is alert and oriented to person, place, and time  GCS: GCS eye subscore is 4  GCS verbal subscore is 5  GCS motor subscore is 6  Cranial Nerves: Cranial nerve deficit present  Sensory: Sensation is intact  Motor: Motor function is intact  Coordination: Coordination is intact        Comments: R sided facial droop sparing forehead is mild   Psychiatric:         Mood and Affect: Mood normal          ED Medications  Medications   aspirin (ECOTRIN LOW STRENGTH) EC tablet 81 mg (has no administration in time range)   clopidogrel (PLAVIX) tablet 75 mg (has no administration in time range)   cholecalciferol (VITAMIN D3) tablet 1,000 Units (1,000 Units Oral Given 5/10/23 1154)   cyanocobalamin (VITAMIN B-12) tablet 1,000 mcg (1,000 mcg Oral Given 5/10/23 1203)   senna (SENOKOT) tablet 8 6 mg (8 6 mg Oral Given 5/10/23 1154)   enoxaparin (LOVENOX) subcutaneous injection 40 mg (40 mg Subcutaneous Given 5/10/23 1154)   atorvastatin (LIPITOR) tablet 40 mg (has no administration in time range)   iohexol (OMNIPAQUE) 350 MG/ML injection (SINGLE-DOSE) 85 mL (85 mL Intravenous Given 5/10/23 0808)   ketorolac (TORADOL) injection 15 mg (15 mg Intravenous Given 5/10/23 0840)   metoclopramide (REGLAN) injection 10 mg (10 mg Intravenous Given 5/10/23 0840)   diphenhydrAMINE (BENADRYL) injection 25 mg (25 mg Intravenous Given 5/10/23 0841)   aspirin (ECOTRIN LOW STRENGTH) EC tablet 324 mg (324 mg Oral Given 5/10/23 0959)   clopidogrel (PLAVIX) tablet 300 mg (300 mg Oral Given 5/10/23 1000)   Gadobutrol injection (SINGLE-DOSE) SOLN 7 mL (7 mL Intravenous Given 5/10/23 1046)       Diagnostic Studies  Results Reviewed     Procedure Component Value Units Date/Time    Lipid Panel with Direct LDL reflex [[id number]]  (Abnormal) Collected: 05/10/23 0742    Lab Status: Final result Specimen: Blood from Arm, Right Updated: 05/10/23 1329     Cholesterol 187 mg/dL      Triglycerides 282 mg/dL      HDL, Direct 51 mg/dL      LDL Calculated 80 mg/dL     Hemoglobin A1C [[id number]] Collected: 05/10/23 0742    Lab Status:  In process Specimen: Blood from Arm, Right Updated: 05/10/23 1302    HS Troponin I 4hr [[id number]]  (Normal) Collected: 05/10/23 1153    Lab Status: Final result Specimen: Blood from Arm, Right Updated: 05/10/23 1237     hs TnI 4hr 4 ng/L      Delta 4hr hsTnI 0 ng/L     Platelet count [[id number]]  (Normal) Collected: 05/10/23 1153    Lab Status: Final result Specimen: Blood from Arm, Right Updated: 05/10/23 1211     Platelets 736 Thousands/uL      MPV 10 2 fL     HS Troponin I 2hr [[id number]]  (Normal) Collected: 05/10/23 0959    Lab Status: Final result Specimen: Blood from Arm, Right Updated: 05/10/23 1048     hs TnI 2hr 4 ng/L      Delta 2hr hsTnI 0 ng/L     Protime-INR [[id number]]  (Normal) Collected: 05/10/23 0742    Lab Status: Final result Specimen: Blood from Arm, Right Updated: 05/10/23 0906     Protime 13 1 seconds      INR 0 97    APTT [[id number]]  (Normal) Collected: 05/10/23 0742    Lab Status: Final result Specimen: Blood from Arm, Right Updated: 05/10/23 0906     PTT 29 seconds     FLU/RSV/COVID - if FLU/RSV clinically relevant [[id number]]  (Normal) Collected: 05/10/23 0742    Lab Status: Final result Specimen: Nares from Nose Updated: 05/10/23 0833     SARS-CoV-2 Negative     INFLUENZA A PCR Negative     INFLUENZA B PCR Negative     RSV PCR Negative    Narrative:      FOR PEDIATRIC PATIENTS - copy/paste COVID Guidelines URL to browser: https://price org/  krystlex    SARS-CoV-2 assay is a Nucleic Acid Amplification assay intended for the  qualitative detection of nucleic acid from SARS-CoV-2 in nasopharyngeal  swabs  Results are for the presumptive identification of SARS-CoV-2 RNA  Positive results are indicative of infection with SARS-CoV-2, the virus  causing COVID-19, but do not rule out bacterial infection or co-infection  with other viruses  Laboratories within the [place] and its  territories are required to report all positive results to the appropriate  public health authorities  Negative results do not preclude SARS-CoV-2  infection and should not be used as the sole basis for treatment or other  patient management decisions  Negative results must be combined with  clinical observations, patient history, and epidemiological information  This test has not been FDA cleared or approved  This test has been authorized by FDA under an Emergency Use Authorization  (EUA)  This test is only authorized for the duration of time the  declaration that circumstances exist justifying the authorization of the  emergency use of an in vitro diagnostic tests for detection of SARS-CoV-2  virus and/or diagnosis of COVID-19 infection under section 564(b)(1) of  the Act, 21 U  S C  998YNX-7(R)(1), unless the authorization is terminated  or revoked sooner  The test has been validated but independent review by FDA  and CLIA is pending  Test performed using [employer] GeneXpert: This RT-PCR assay targets N2,  a region unique to SARS-CoV-2  A conserved region in the E-gene was chosen  for pan-Sarbecovirus detection which includes SARS-CoV-2      According to CMS-2020-01-R, this platform meets the definition of high-[employer] technology  HS Troponin 0hr (reflex protocol) [[id number]]  (Normal) Collected: 05/10/23 0742    Lab Status: Final result Specimen: Blood from Arm, Right Updated: 05/10/23 0815     hs TnI 0hr 4 ng/L     Basic metabolic panel [[id number]]  (Abnormal) Collected: 05/10/23 0742    Lab Status: Final result Specimen: Blood from Arm, Right Updated: 05/10/23 0806     Sodium 139 mmol/L      Potassium 3 7 mmol/L      Chloride 101 mmol/L      CO2 30 mmol/L      ANION GAP 8 mmol/L      BUN 9 mg/dL      Creatinine 0 59 mg/dL      Glucose 88 mg/dL      Calcium 9 5 mg/dL      eGFR 98 ml/min/1 73sq m     Narrative:      [place] guidelines for Chronic Kidney Disease (CKD):   •  Stage 1 with normal or high GFR (GFR > 90 mL/min/1 73 square meters)  •  Stage 2 Mild CKD (GFR = 60-89 mL/min/1 73 square meters)  •  Stage 3A Moderate CKD (GFR = 45-59 mL/min/1 73 square meters)  •  Stage 3B Moderate CKD (GFR = 30-44 mL/min/1 73 square meters)  •  Stage 4 Severe CKD (GFR = 15-29 mL/min/1 73 square meters)  •  Stage 5 End Stage CKD (GFR <15 mL/min/1 73 square meters)  Note: GFR calculation is accurate only with a steady state creatinine    CBC and Platelet [[id number]]  (Normal) Collected: 05/10/23 0742    Lab Status: Final result Specimen: Blood from Arm, Right Updated: 05/10/23 0758     WBC 8 15 Thousand/uL      RBC 4 44 Million/uL      Hemoglobin 12 9 g/dL      Hematocrit 39 7 %      MCV 89 fL      MCH 29 1 pg      MCHC 32 5 g/dL      RDW 13 5 %      Platelets 268 Thousands/uL      MPV 10 2 fL     Fingerstick Glucose (POCT) [[id number]]  (Normal) Collected: 05/10/23 0731    Lab Status: Final result Updated: 05/10/23 0739     POC Glucose 91 mg/dl                  MRI brain w wo contrast   Final Result by Elvira Hoffman MD (05/10 1344)      No mass effect, acute intracranial hemorrhage or evidence of recent infarction  No abnormal parenchymal or leptomeningeal enhancement identified        Workstation performed: [id number]         X-ray chest 1 view portable   ED Interpretation by Milind Abarca MD (05/10 0423)   NAD      Final Result by Calvin Crystal MD (05/10 6578)      No acute cardiopulmonary disease  Workstation performed: [id number]         CTA stroke alert (head/neck)   Final Result by Lina Knott MD (05/10 1661)      No evidence for high-grade stenosis, focal occlusion or vascular aneurysm of the cervical or intracranial vessels  Findings were directly discussed with Dai Mart  at 8:14 a m  Workstation performed: [id number]         CT stroke alert brain   Final Result by Lina Knott MD (05/10 8883)      No mass effect, acute intracranial hemorrhage or evidence of recent infarction  Findings were directly discussed with Dai Mart  at 8:14 a m           Workstation performed: [id number]               Procedures  ECG 12 Lead Documentation Only    Date/Time: 5/10/2023 3:48 PM  Performed by: Johann Vicente DO  Authorized by: Johann Vicente DO     Indications / Diagnosis:  Stroke alert  ECG reviewed by me, the ED Provider: yes    Patient location:  ED  Previous ECG:     Previous ECG:  Compared to current    Similarity:  No change  Interpretation:     Interpretation: normal    Rate:     ECG rate:  73    ECG rate assessment: normal    Rhythm:     Rhythm: sinus rhythm    Ectopy:     Ectopy: none    QRS:     QRS axis:  Normal  Conduction:     Conduction: normal    ST segments:     ST segments:  Normal  T waves:     T waves: normal            ED Course                  Stroke Assessment     Row Name 05/10/23 0743             NIH Stroke Scale    Interval Baseline      Level of Consciousness (1a ) 0      LOC Questions (1b ) 0      LOC Commands (1c ) 0      Best Gaze (2 ) 0      Visual (3 ) 0      Facial Palsy (4 ) 2      Motor Arm, Left (5a ) 0      Motor Arm, Right (5b ) 0      Motor Leg, Left (6a ) 0      Motor Leg, Right (6b ) 0 Limb Ataxia (7 ) 0      Sensory (8 ) 0      Best Language (9 ) 0      Dysarthria (10 ) 0      Extinction and Inattention (11 ) (Formerly Neglect) 0      Total 2                                    Medical Decision Making  67-year-old female patient presenting with right-sided facial droop and sensation of numbness down the right arm  Initial differential diagnosis included stroke, TIA, Bell's palsy among others  On initial evaluation, patient appeared to have mild right-sided facial droop sparing the forehead and total NIH stroke scale was calculated to be 2 with this being only significant symptom  Otherwise, patient was hypertensive but vital signs stable  Stroke alert was called and did speak with neurology who evaluated the patient and followed throughout ED course  For stroke alert work-up, CT imaging of the head was done which showed no acute abnormality or sign of significant head bleed, arterial stenosis, evidence of thromboembolic stroke  Labs ordered were largely unremarkable  Case was again discussed with neurology who advised admission on stroke pathway for further evaluation and treatment  Prior to admission, patient was given aspirin and Plavix  At time of admission, patient was stable and in understanding of and in agreement with plan  Facial droop: acute illness or injury  Stroke-like symptoms: acute illness or injury  Amount and/or Complexity of Data Reviewed  Labs: ordered  Radiology: ordered  Risk  OTC drugs  Prescription drug management  Decision regarding hospitalization              Disposition  Final diagnoses:   Stroke-like symptoms   Facial droop     Time reflects when diagnosis was documented in both MDM as applicable and the Disposition within this note     Time User Action Codes Description Comment    5/10/2023  7:39 AM Zalma Dinning Add [I63 9] Stroke ([facility] [address] )     5/10/2023  7:39 AM Zalma Dinning Add [R87 610] Atypical squamous cells of undetermined significance on cytologic smear of cervix ([facility]-US)     5/10/2023  7:39 AM Azell Spells Add [K76 89] Liver cyst     5/10/2023  7:39 AM Chelsea Damon Add [R10 10] Pain of upper abdomen     5/10/2023  7:39 AM Azell Spells Add Gia BLAS] Depression, unspecified depression type     5/10/2023  7:39 AM Mariama Damon Add [E87 6] Hypokalemia     5/10/2023  7:39 AM Azell Spells Add [Z23] Encounter for immunization     5/10/2023  7:39 AM Azell Spells Add [Z12 39] Screening for breast cancer     5/10/2023  7:39 AM Azell Spells Add [R10 13] Epigastric pain     5/10/2023  7:39 AM Azell Spells Add [Z12 11] Screen for colon cancer     5/10/2023  8:37 AM Lilia Alcala Add [R29 90] Stroke-like symptoms     5/10/2023  8:51 AM Veda Mitchell Add [R29 810] Facial droop       ED Disposition     ED Disposition   Admit    Condition   Stable    Date/Time   Wed May 10, 2023  8:25 AM    Comment   Case was discussed with           Follow-up Information    None         Current Discharge Medication List      START taking these medications    Details   aspirin 81 mg chewable tablet Chew 4 tablets (324 mg total) daily  Qty: 20 tablet, Refills: 0    Associated Diagnoses: Stroke-like symptoms      clopidogrel (Plavix) 75 mg tablet Take 2 5 tablets (187 5 mg total) by mouth daily  Qty: 20 tablet, Refills: 0    Associated Diagnoses: Stroke-like symptoms         CONTINUE these medications which have NOT CHANGED    Details   cholecalciferol (VITAMIN D3) 1,000 units tablet Take 1,000 Units by mouth daily      Collagen Hydrolysate POWD by Does not apply route Powder in water      cyanocobalamin (VITAMIN B-12) 1000 MCG tablet Take 1,000 mcg by mouth daily      Na Sulfate-K Sulfate-Mg Sulf (Suprep Bowel Prep Kit) 17 5-3 13-1 6 GM/177ML SOLN Take 2 Bottles by mouth see administration instructions Please follow the instructions from the office  Qty: 2 Bottle, Refills: 0    Associated Diagnoses: Screen for colon cancer           No discharge procedures on file  PDMP Review     None           ED Provider  Attending physically available and evaluated Pat Hinton  I managed the patient along with the ED Attending      Electronically Signed by         Johnathon Ventura DO  05/10/23 1471

## 2023-05-10 NOTE — ASSESSMENT & PLAN NOTE
· Right-sided facial weakness, numbness and tingling in right arm and bilateral hands  No motor weakness  · Stroke alert in the ED  · Neurology was consulted   Not offered TNK due to being outside the window  · Loaded with aspirin and Plavix  · CT head: no acute intracranial abnormalities  · CTA head and neck: no high grade stenosis, or aneurysm  MRI pending  ECHO pending    No know risk factor other than age at this time    MRI of the brain is negative  Most likely Bell's palsy  Neurology recommended prednisone 60 mg and Valtrex for 7 days  Stable for discharge

## 2023-05-10 NOTE — H&P
[facility]  H&P  Name: Jairo Lowry 59 y o  female I MRN: [id number]  Unit/Bed#: ED-29 I Date of Admission: 5/10/2023   Date of Service: 5/10/2023 I Hospital Day: 0      Assessment/Plan   * Stroke-like symptoms  Assessment & Plan  · Right-sided facial weakness, numbness and tingling in right arm and bilateral hands  No motor weakness  · Stroke alert in the ED  · Neurology was consulted  Not offered TNK due to being outside the window  · Loaded with aspirin and Plavix  · CT head: no acute intracranial abnormalities  · CTA head and neck: no high grade stenosis, or aneurysm  MRI pending  ECHO pending    No know risk factor other than age at this time      Plan:  Continue Aspirin 81 mg and Plavix  Continue Atorvastatin  F/U MRI  F/U Echo   Lipid panel  A1C  Telemetry   Neurochecks Qshift            Headache  Assessment & Plan  States that earlier today she started developing a posterior headache  Reports significant improvement after migraine cocktail    Obesity  Assessment & Plan  Encourage lifestyle modification         VTE Pharmacologic Prophylaxis: VTE Score: 3 Moderate Risk (Score 3-4) - Pharmacological DVT Prophylaxis Ordered: enoxaparin (Lovenox)  Code Status: Level 1 - Full Code discussed with patient at beside   Discussion with family: Attempted to update  (daughter) via phone  Left voicemail  Anticipated Length of Stay: Patient will be admitted on an inpatient basis with an anticipated length of stay of greater than 2 midnights secondary to Stroke pathway  Chief Complaint: right sided facial weakness    History of Present Illness:  Jairo Lowry is a 59 y o  female with no pertinent past medical history who presents with with stroke like symptoms  She reports that when she woke up around 1 AM this morning and was in her bathroom preparing for work, she noticed that water was dripping from the side her mother   She also was experience some numbness and "tingling in right arm a nd hands  Reports that the right side of her face felt weak  she states that she went to work hoping that her symptoms would improve however while at work her coworkers immediately noticed a droopiness to the right side of her face and encouraged her to seek medical attention  In the ED she was a stroke alert ,Neurology was consulted and she was loaded with aspirin and Plavix  She did not receive TNK due to being outside of the window  Review of Systems:  Review of Systems   Constitutional: Negative for activity change, appetite change, chills, diaphoresis and fatigue  HENT: Positive for voice change  Respiratory: Negative for cough and shortness of breath  Cardiovascular: Negative for chest pain and palpitations  Gastrointestinal: Negative for abdominal pain, blood in stool, diarrhea, nausea and vomiting  Genitourinary: Negative for flank pain, frequency and urgency  Musculoskeletal: Positive for back pain  Negative for gait problem, neck pain and neck stiffness  Neurological: Positive for numbness and headaches  Negative for dizziness, speech difficulty, weakness and light-headedness  Psychiatric/Behavioral: Negative for behavioral problems, confusion and decreased concentration  Past Medical and Surgical History:   Past Medical History:   Diagnosis Date   • Abdominal pain     \"burning pain\" came to the ED   • Renal disorder    • Wears dentures     permanent upper denture   • Weight loss     20 lb wt loss in 2-3 weeks d/t stomach issues       Past Surgical History:   Procedure Laterality Date   • CHOLECYSTECTOMY     • TUBAL LIGATION      at 36        Meds/Allergies:  Prior to Admission medications    Medication Sig Start Date End Date Taking?  Authorizing Provider   aspirin 81 mg chewable tablet Chew 4 tablets (324 mg total) daily 5/10/23  Yes Rell Alcala DO   cholecalciferol (VITAMIN D3) 1,000 units tablet Take 1,000 Units by mouth daily   Yes " "Historical Provider, MD   clopidogrel (Plavix) 75 mg tablet Take 2 5 tablets (187 5 mg total) by mouth daily 5/10/23  Yes Liban Amaro,    Collagen Hydrolysate POWD by Does not apply route Powder in water   Yes Historical Provider, MD   cyanocobalamin (VITAMIN B-12) 1000 MCG tablet Take 1,000 mcg by mouth daily   Yes Historical Provider, MD   Na Sulfate-K Sulfate-Mg Sulf (Suprep Bowel Prep Kit) 17 5-3 13-1 6 GM/177ML SOLN Take 2 Bottles by mouth see administration instructions Please follow the instructions from the office 3/11/20  Yes Yara Lo MD   pantoprazole (PROTONIX) 40 mg tablet TAKE 1 TABLET BY MOUTH EVERY DAY  Patient not taking: Reported on 5/10/2023 5/15/20 5/10/23  Ashley Marte MD     I have reviewed home medications with patient personally  Allergies: No Known Allergies    Social History:  Marital Status: Single   Occupation: Employed  Patient Pre-hospital Living Situation: Home  Patient Pre-hospital Level of Mobility: walks  Patient Pre-hospital Diet Restrictions: None  Substance Use History:   Social History     Substance and Sexual Activity   Alcohol Use Not Currently     Social History     Tobacco Use   Smoking Status Never   Smokeless Tobacco Never   Tobacco Comments    social      Social History     Substance and Sexual Activity   Drug Use Never       Family History:  Family History   Problem Relation Age of Onset   • Arthritis Mother    • Heart attack Father    • Coronary artery disease Father    • Glaucoma Father    • Heart disease Father        Physical Exam:     Vitals:   Blood Pressure: 128/66 (05/10/23 1414)  Pulse: 72 (05/10/23 1414)  Temperature: (!) 97 3 °F (36 3 °C) (05/10/23 0736)  Temp Source: Oral (05/10/23 0736)  Respirations: 18 (05/10/23 1414)  Height: 4' 11\" (149 9 cm) (05/10/23 1338)  Weight - Scale: 75 3 kg (166 lb) (05/10/23 1338)  SpO2: 98 % (05/10/23 1414)    Physical Exam  Vitals and nursing note reviewed     Constitutional:       General: " She is not in acute distress  Appearance: She is well-developed  HENT:      Head: Normocephalic and atraumatic  Eyes:      Conjunctiva/sclera: Conjunctivae normal    Cardiovascular:      Rate and Rhythm: Normal rate and regular rhythm  Heart sounds: No murmur heard  Pulmonary:      Effort: Pulmonary effort is normal  No respiratory distress  Breath sounds: Normal breath sounds  Abdominal:      Palpations: Abdomen is soft  Tenderness: There is no abdominal tenderness  Musculoskeletal:         General: No swelling  Cervical back: Neck supple  Skin:     General: Skin is warm and dry  Capillary Refill: Capillary refill takes less than 2 seconds  Neurological:      Mental Status: She is alert and oriented to person, place, and time  Cranial Nerves: No facial asymmetry  Sensory: Sensation is intact  Motor: Motor function is intact     Psychiatric:         Mood and Affect: Mood normal          Behavior: Behavior normal           Additional Data:     Lab Results:  Results from last 7 days   Lab Units 05/10/23  1153 05/10/23  0742   WBC Thousand/uL  --  8 15   HEMOGLOBIN g/dL  --  12 9   HEMATOCRIT %  --  39 7   PLATELETS Thousands/uL 278 303     Results from last 7 days   Lab Units 05/10/23  0742   SODIUM mmol/L 139   POTASSIUM mmol/L 3 7   CHLORIDE mmol/L 101   CO2 mmol/L 30   BUN mg/dL 9   CREATININE mg/dL 0 59*   ANION GAP mmol/L 8   CALCIUM mg/dL 9 5   GLUCOSE RANDOM mg/dL 88     Results from last 7 days   Lab Units 05/10/23  0742   INR  0 97     Results from last 7 days   Lab Units 05/10/23  0731   POC GLUCOSE mg/dl 91               Lines/Drains:  Invasive Devices     Peripheral Intravenous Line  Duration           Peripheral IV 05/10/23 Right Antecubital <1 day                    Imaging: Reviewed radiology reports from this admission including: CT head  MRI brain w wo contrast   Final Result by Aleksandr Allison MD (05/10 1344)      No mass effect, acute intracranial hemorrhage or evidence of recent infarction  No abnormal parenchymal or leptomeningeal enhancement identified  Workstation performed: [id number]         X-ray chest 1 view portable   ED Interpretation by Alis Moore MD (05/10 4524)   NAD      Final Result by Raphael Colon MD (05/10 2139)      No acute cardiopulmonary disease  Workstation performed: [id number]         CTA stroke alert (head/neck)   Final Result by Curtis Humphreys MD (05/10 1768)      No evidence for high-grade stenosis, focal occlusion or vascular aneurysm of the cervical or intracranial vessels  Findings were directly discussed with Omari Martin  at 8:14 a m  Workstation performed: [id number]         CT stroke alert brain   Final Result by Curtis Humphreys MD (05/10 8659)      No mass effect, acute intracranial hemorrhage or evidence of recent infarction  Findings were directly discussed with Omari Martin  at 8:14 a m  Workstation performed: [id number]             EKG and Other Studies Reviewed on Admission:   · EKG: NSR  HR 78     ** Please Note: This note has been constructed using a voice recognition system   **

## 2023-05-10 NOTE — ASSESSMENT & PLAN NOTE
Ramiro Salvador is a 59 y o  female with GERD, depression, osteoporosis who presents to [facility] ED on 5/10/2023 as a stroke alert with right facial weakness, right upper and lower extremity paresthesia, and posterior headache  BP on presentation: Elevated 191/84  NIHSS of 3 for right facial weakness and right hemisensory deficit  CTH and CTA head and neck unremarkable  Patient was not an IV TNK candidate as patient was out of the time window  Workup:  · CT head: Unremarkable for acute intracranial abnormalities  · CTA head and neck: Unremarkable for large vessel occlusion/critical stenosis  · MRI brain w wo: Unremarkable for acute infarct, hemorrhage, or abnormal enhancement   · Echo: EF 55%, wall motion normal; bilateral atrium size WNL  · Hemoglobin A1c: 5 6  · Lipid panel: Total cholesterol 187, Triglycerides elevated 282, HDL 51, LDL 80    Worsening of right facial weakness, now with new decreased blinking of right eye, weakness with right eyelid closure, and diminished forehead wrinkles on the right, concerning for Bell's palsy  Etiology of Bell's palsy unclear, will test for Lyme  MRI brain unremarkable for evidence of acute infarct  Unlikely TIA as symptoms have persisted > 24 hours      Plan:  - No further neuroimaging needed at this time  - Pending: Lyme total antibody profile  - S/p load with  mg x 1 and Plavix 300 mg x 1  - Okay to discontinue dual antiplatelet therapy: ASA and Plavix  - Will defer statin therapy to primary team as triglycerides were elevated   - S/p migraine cocktail on presentation: Benadryl 25 mg x1, Toradol 15 mg x1, Reglan 10 mg x1  - Recommend discharging on prednisone 60 mg x 1 week and valacyclovir 1000 mg TID is 1 week  - Recommend artificial tears on discharge  - Goal normotension, euglycemia, normothermia   - PT/OT/Speech   - Medical management and supportive care per primary team, notify with changes  - Patient to follow-up with outpatient neurology in 6-8 weeks

## 2023-05-10 NOTE — ED NOTES
Patient provided with Lunch menu, daughter instructed on how to place order        Haritha Kramer RN  05/10/23 1189

## 2023-05-10 NOTE — ASSESSMENT & PLAN NOTE
States that earlier today she started developing a posterior headache  Reports significant improvement after migraine cocktail

## 2023-05-10 NOTE — ASSESSMENT & PLAN NOTE
· Right-sided facial weakness, numbness and tingling in right arm and bilateral hands  No motor weakness  · Stroke alert in the ED  · Neurology was consulted   Not offered TNK due to being outside the window  · Loaded with aspirin and Plavix  · CT head: no acute intracranial abnormalities  · CTA head and neck: no high grade stenosis, or aneurysm  MRI pending  ECHO pending    No know risk factor other than age at this time      Plan:  Continue Aspirin 81 mg and Plavix  Continue Atorvastatin  F/U MRI  F/U Echo   Lipid panel  A1C

## 2023-05-10 NOTE — CONSULTS
Consultation - Stroke   Sameer Jacques 59 y o  female MRN: [id number]  Unit/Bed#: ED-29 Encounter: [id number]      Assessment/Plan   Stroke-like symptoms  Assessment & Plan  Sameer Jacques is a 59 y o  female with GERD, depression, osteoporosis who presents to [facility] ED on 5/10/2023 as a stroke alert with right facial weakness, right upper and lower extremity paresthesia, and posterior headache  · BP on presentation: Elevated 191/84  · NIHSS: 3 for right facial weakness and right hemisensory deficit  · CT head: Unremarkable for acute intracranial abnormalities  · CTA head and neck: Unremarkable for large vessel occlusion/critical stenosis  Thrombolytic Decision: Patient not a candidate  Unclear time of onset outside appropriate time window  and Symptoms resolved/clearly non disabling  Etiology of right facial weakness and right hemisensory deficit unclear at this time  Will obtain MRI brain to assess for acute infarct  Other considerations include complex migraine given reported new onset posterior headache  Less likely Bell's palsy as there was no forehead involvement on examination and symptoms involved right upper and lower extremity  Plan:  Admit on stroke pathway  - Recommend MRI brain w wo  - Echo   - Recommend: Fasting lipid panel, Hemoglobin A1c, TSH  - Recommend loading with  mg x 1 and Plavix 300 mg x 1  - Plan to start ASA 81 mg daily and Plavix 75 mg daily on 5/11/2023  - Okay to start Atorvastatin 40 mg daily if able   - Can give migraine cocktail if headache persists, discussed with ED provider  - Allow for permissive hypertension for 24 hours post stroke symptom onset   Goal /110  - If MRI brain is negative, recommend normotension    - Euglycemia, Normothermia   - Telemetry  - Frequent neuro checks  - PT/OT/Speech   - Stroke Education   - STAT CT head for any acute change in neuro exam  - Medical management and supportive care per primary team, notify with changes  - Correction of any metabolic or infectious disturbances    Recommendations for outpatient neurological follow up have yet to be determined  History of Present Illness     Reason for Consult / Principal Problem: Stroke alert, right facial weakness  Hx and PE limited by: Patient is primarily Bahamian-speaking, utilized daughter to help translate via telephone  Patient last known well: 11 PM on 5/9/2023  Stroke alert called: 0744 on 5/10/2023  Neurology time of arrival: Attending neurologist responded immediately to the phone call  HPI: Ananth Balbuena is a 59 y o   female with GERD, depression, osteoporosis who presents to [facility] ED on 5/10/2023 as a stroke alert with right facial weakness and posterior headache  History obtained per discussion with patient and with the assist of patient's daughter to translate  Patient reports she went to bed last night 5/9/2023 at approximately 8 PM   Patient states she woke up at approximately 11 PM to use the bathroom and states that that was her last known normal   She then went back to bed and woke up again at 1:15 AM (5/10/2023) to get ready to go to work  While brushing her teeth patient states that she had water in her mouth and noticed that the water was dripping out of the right side of her mouth  Patient also noticed numbness and tingling affecting her right arm, right leg, and the right side of her mouth  She admits to having a posterior headache at that time  She also admits to experiencing some weakness in right lower extremity, however does report chronic pain due to osteoporosis  Patient states she went to work anyway hoping that the symptoms would resolve, however her coworker noticed the facial weakness and advised her to go to the hospital  Patient denies similar symptoms in the past, history of stroke, or blood thinner use  Patient presented to [facility] ED on 5/10/2023 as a stroke alert with right facial weakness and right hemisensory deficit   NIHSS "was 3  CT head and CTA head and neck unremarkable for acute intracranial abnormalities or evidence of large vessel occlusion or critical stenosis  Patient was not an IV TNK candidate as she was outside of the appropriate time window and symptoms also improving  Patient was loaded with aspirin and Plavix and admitted on the stroke pathway  Consults    Review of Systems  12 point ROS limited to acuity of condition  Historical Information   Past Medical History:   Diagnosis Date   • Abdominal pain     \"burning pain\" came to the ED   • Renal disorder    • Wears dentures     permanent upper denture   • Weight loss     20 lb wt loss in 2-3 weeks d/t stomach issues     Past Surgical History:   Procedure Laterality Date   • CHOLECYSTECTOMY     • TUBAL LIGATION      at 29      Social History   Social History     Substance and Sexual Activity   Alcohol Use Not Currently     Social History     Substance and Sexual Activity   Drug Use Never     E-Cigarette/Vaping   • E-Cigarette Use Never User      E-Cigarette/Vaping Substances     Social History     Tobacco Use   Smoking Status Never   Smokeless Tobacco Never   Tobacco Comments    social      Family History:   Family History   Problem Relation Age of Onset   • Arthritis Mother    • Heart attack Father    • Coronary artery disease Father    • Glaucoma Father    • Heart disease Father        Review of previous medical records was completed      Meds/Allergies   all current active meds have been reviewed, current meds:   Current Facility-Administered Medications   Medication Dose Route Frequency   • aspirin (ECOTRIN LOW STRENGTH) EC tablet 324 mg  324 mg Oral Once   • [START ON 5/11/2023] aspirin (ECOTRIN LOW STRENGTH) EC tablet 81 mg  81 mg Oral Daily   • clopidogrel (PLAVIX) tablet 300 mg  300 mg Oral Once   • [START ON 5/11/2023] clopidogrel (PLAVIX) tablet 75 mg  75 mg Oral Daily    and PTA meds:   Prior to Admission Medications   Prescriptions Last Dose Informant Patient " Reported? Taking? Collagen Hydrolysate POWD   Yes No   Sig: by Does not apply route Powder in water   Na Sulfate-K Sulfate-Mg Sulf (Suprep Bowel Prep Kit) 17 5-3 13-1 6 GM/177ML SOLN   No No   Sig: Take 2 Bottles by mouth see administration instructions Please follow the instructions from the office   cholecalciferol (VITAMIN D3) 1,000 units tablet   Yes No   Sig: Take 1,000 Units by mouth daily   cyanocobalamin ([employer] Vitamin B-12) 1000 MCG tablet   Yes No   Sig: Take 1,000 mcg by mouth daily   pantoprazole (PROTONIX) 40 mg tablet   No No   Sig: TAKE 1 TABLET BY MOUTH EVERY DAY      Facility-Administered Medications: None       No Known Allergies    Objective   Vitals:Blood pressure 135/65, pulse 63, temperature (!) 97 3 °F (36 3 °C), temperature source Oral, resp  rate 18, weight 75 5 kg (166 lb 7 2 oz), SpO2 98 %, not currently breastfeeding  ,Body mass index is 33 62 kg/m²  No intake or output data in the 24 hours ending 05/10/23 0944    Invasive Devices: Invasive Devices     Peripheral Intravenous Line  Duration           Peripheral IV 05/10/23 Right Antecubital <1 day              Physical Exam  Vitals and nursing note reviewed  Constitutional:       General: She is not in acute distress  Appearance: Normal appearance  She is not ill-appearing, toxic-appearing or diaphoretic  HENT:      Head: Normocephalic and atraumatic  Eyes:      General: No scleral icterus  Right eye: No discharge  Left eye: No discharge  Extraocular Movements: Extraocular movements intact and EOM normal       Conjunctiva/sclera: Conjunctivae normal    Musculoskeletal:         General: Normal range of motion  Cervical back: Normal range of motion and neck supple  Skin:     General: Skin is warm and dry  Coloration: Skin is not jaundiced or pale  Neurological:      Mental Status: She is alert     Psychiatric:      Comments: Patient appears very anxious throughout encounter       Neurologic Exam Mental Status   Patient is alert, sitting up in bed, unaccompanied in the room  Patient is primarily German-speaking, later calling her daughter to assist in translating  Oriented to person, place, month, and year  Able to name objects provided, follows central and appendicular commands, and answers all questions appropriately  Cranial Nerves     CN II   Visual fields full to confrontation  CN III, IV, VI   Extraocular motions are normal    Nystagmus: none   Upgaze: normal  Downgaze: normal  Conjugate gaze: present    CN V   Facial sensation intact  CN VIII   Hearing: intact    CN XI   CN XI normal      CN XII   Tongue deviation: none  Facial sensation intact to light touch and pinprick bilaterally  Right lower facial weakness noted, asymmetric smile  Eyebrow raise/forehead wrinkles symmetric     Motor Exam   No drift noted on all extremities    Bilateral  5/5  Bilateral biceps, triceps, and deltoid strength 5/5    Bilateral hip flexion strength 5/5 with giveaway, pain limited  Bilateral dorsiflexion and plantarflexion strength 5/5     Sensory Exam   Sensation to light touch and pinprick intact throughout  No evidence of extinction with bilateral simultaneous stimulation     Gait, Coordination, and Reflexes     Tremor   Resting tremor: absent  No ataxia or dysmetria in bilateral upper extremity finger-nose testing    No involuntary movements or rhythmic seizure-like activity noted throughout exam     NIHSS:  1a Level of Consciousness: 0 = Alert   1b  LOC Questions: 0 = Answers both correctly   1c  LOC Commands: 0 = Obeys both correctly   2  Best Gaze: 0 = Normal   3  Visual: 0 = No visual field loss   4  Facial Palsy: 2=Partial paralysis (total or near total paralysis of the lower face)   5a  Motor Right Arm: 0=No drift, limb holds 90 (or 45) degrees for full 10 seconds   5b  Motor Left Arm: 0=No drift, limb holds 90 (or 45) degrees for full 10 seconds   6a   Motor Right Le=No drift, limb holds 90 (or 45) degrees for full 10 seconds   6b  Motor Left Le=No drift, limb holds 90 (or 45) degrees for full 10 seconds   7  Limb Ataxia:  0=Absent   8  Sensory: 1=Mild to moderate sensory loss; patient feels pinprick is less sharp or is dull on the affected side; there is a loss of superficial pain with pinprick but patient is aware She is being touched   9  Best Language:  0=No aphasia, normal   10  Dysarthria: 0=Normal articulation   11  Extinction and Inattention (formerly Neglect): 0=No abnormality   Total Score: 3     Time NIHSS was completed: 0815 on 5/10/2023    Modified Townsend Score:  0 (No baseline symptoms/disability)    Lab Results: I have personally reviewed pertinent reports    Recent Results (from the past 24 hour(s))   Fingerstick Glucose (POCT)    Collection Time: 05/10/23  7:31 AM   Result Value Ref Range    POC Glucose 91 65 - 140 mg/dl   Basic metabolic panel    Collection Time: 05/10/23  7:42 AM   Result Value Ref Range    Sodium 139 135 - 147 mmol/L    Potassium 3 7 3 5 - 5 3 mmol/L    Chloride 101 96 - 108 mmol/L    CO2 30 21 - 32 mmol/L    ANION GAP 8 4 - 13 mmol/L    BUN 9 5 - 25 mg/dL    Creatinine 0 59 (L) 0 60 - 1 30 mg/dL    Glucose 88 65 - 140 mg/dL    Calcium 9 5 8 4 - 10 2 mg/dL    eGFR 98 ml/min/1 73sq m   CBC and Platelet    Collection Time: 05/10/23  7:42 AM   Result Value Ref Range    WBC 8 15 4 31 - 10 16 Thousand/uL    RBC 4 44 3 81 - 5 12 Million/uL    Hemoglobin 12 9 11 5 - 15 4 g/dL    Hematocrit 39 7 34 8 - 46 1 %    MCV 89 82 - 98 fL    MCH 29 1 26 8 - 34 3 pg    MCHC 32 5 31 4 - 37 4 g/dL    RDW 13 5 11 6 - 15 1 %    Platelets 622 362 - 613 Thousands/uL    MPV 10 2 8 9 - 12 7 fL   Protime-INR    Collection Time: 05/10/23  7:42 AM   Result Value Ref Range    Protime 13 1 11 6 - 14 5 seconds    INR 0 97 0 84 - 1 19   APTT    Collection Time: 05/10/23  7:42 AM   Result Value Ref Range    PTT 29 23 - 37 seconds   HS Troponin 0hr (reflex protocol)    Collection Time: "05/10/23  7:42 AM   Result Value Ref Range    hs TnI 0hr 4 \"Refer to ACS Flowchart\"- see link ng/L   FLU/RSV/COVID - if FLU/RSV clinically relevant    Collection Time: 05/10/23  7:42 AM    Specimen: Nose; Nares   Result Value Ref Range    SARS-CoV-2 Negative Negative    INFLUENZA A PCR Negative Negative    INFLUENZA B PCR Negative Negative    RSV PCR Negative Negative   ECG 12 lead    Collection Time: 05/10/23  7:47 AM   Result Value Ref Range    Ventricular Rate 74 BPM    Atrial Rate 74 BPM    WV Interval 150 ms    QRSD Interval 78 ms    QT Interval 378 ms    QTC Interval 419 ms    P Axis 52 degrees    QRS Axis 47 degrees    T Wave Axis 51 degrees   ECG 12 lead    Collection Time: 05/10/23  7:49 AM   Result Value Ref Range    Ventricular Rate 73 BPM    Atrial Rate 73 BPM    WV Interval 150 ms    QRSD Interval 78 ms    QT Interval 378 ms    QTC Interval 416 ms    P Axis 65 degrees    QRS Axis 52 degrees    T Wave Axis 51 degrees   ]  Imaging Studies: I have personally reviewed pertinent reports and I have personally reviewed pertinent films in PACS  EKG, Pathology, and Other Studies: I have personally reviewed pertinent reports  VTE Prophylaxis: Patient in the ED, will be placed on DVT prophylaxis once admitted to the floor    Counseling / Coordination of Care  Total time spent today 46 minutes  Greater than 50% of total time was spent with the patient and/or family counseling and/or coordination of care  A description of the counseling/coordination of care:  Patient was seen and evaluated  Discussed with attending  Chart reviewed thoroughly including laboratory and imaging studies  Plan of care discussed with patient and primary team  Discussed CT head and CTA head and neck results with patient and daughter via telephone, as well as recommendations to load with aspirin and Plavix and admitted on stroke pathway  Dictation voice to text software has been used in the creation of this document    Please " consider this in light of any contextual or grammatical errors

## 2023-05-10 NOTE — ED NOTES
Patient transported to [facility] [employer] [facility] RN  05/10/23 1016

## 2023-05-10 NOTE — LETTER
[facility] MED SURG UNIT  [address] [id number]  Dept: [phone]    May 11, 2023     Patient: Nate Weber   YOB: 1960   Date of Visit: 5/10/2023       To Whom it May Concern:    Lia Whitehead is under my professional care  She was seen in the hospital from 5/10/2023 to 05/11/23  She may return to work on 5/15/2023 without limitations  If you have any questions or concerns, please don't hesitate to call           Sincerely,          Oracio Church MD

## 2023-05-10 NOTE — PLAN OF CARE
Problem: PAIN - ADULT  Goal: Verbalizes/displays adequate comfort level or baseline comfort level  Description: Interventions:  - Encourage patient to monitor pain and request assistance  - Assess pain using appropriate pain scale  - Administer analgesics based on type and severity of pain and evaluate response  - Implement non-pharmacological measures as appropriate and evaluate response  - Consider cultural and social influences on pain and pain management  - Notify physician/advanced practitioner if interventions unsuccessful or patient reports new pain  5/10/2023 1708 by Gael Ballesteros RN  Outcome: Progressing       Problem: SAFETY ADULT  Goal: Patient will remain free of falls  Description: INTERVENTIONS:  - Educate patient/family on patient safety including physical limitations  - Instruct patient to call for assistance with activity   - Consult OT/PT to assist with strengthening/mobility   - Keep Call bell within reach  - Keep bed low and locked with side rails adjusted as appropriate  - Keep care items and personal belongings within reach  - Initiate and maintain comfort rounds  - Make Fall Risk Sign visible to staff  - Offer Toileting every 2 Hours, in advance of need  - Initiate/Maintain alarm  - Obtain necessary fall risk management equipment:   - Apply yellow socks and bracelet for high fall risk patients  - Consider moving patient to room near nurses station  5/10/2023 1708 by Gael Ballesteros RN  Outcome: Progressing    Goal: Maintain or return to baseline ADL function  Description: INTERVENTIONS:  -  Assess patient's ability to carry out ADLs; assess patient's baseline for ADL function and identify physical deficits which impact ability to perform ADLs (bathing, care of mouth/teeth, toileting, grooming, dressing, etc )  - Assess/evaluate cause of self-care deficits   - Assess range of motion  - Assess patient's mobility; develop plan if impaired  - Assess patient's need for assistive devices and provide as appropriate  - Encourage maximum independence but intervene and supervise when necessary  - Involve family in performance of ADLs  - Assess for home care needs following discharge   - Consider OT consult to assist with ADL evaluation and planning for discharge  - Provide patient education as appropriate  5/10/2023 1708 by Swapna Solorzano RN  Outcome: Progressing  5/10/2023 1707 by Swapna Solorzano RN  Outcome: Progressing  Goal: Maintains/Returns to pre admission functional level  Description: INTERVENTIONS:  - Perform BMAT or MOVE assessment daily    - Set and communicate daily mobility goal to care team and patient/family/caregiver  - Collaborate with rehabilitation services on mobility goals if consulted  - Perform Range of Motion   - Reposition patient   - Dangle patient   - Stand patient   - Ambulate patient   - Out of bed to chair   - Out of bed for meals   - Out of bed for toileting  - Record patient progress and toleration of activity level   5/10/2023 1708 by Swapna Solorzano RN  Outcome: Progressing       Problem: Neurological Deficit  Goal: Neurological status is stable or improving  Description: Interventions:  - Monitor and assess patient's level of consciousness, motor function, sensory function, and level of assistance needed for ADLs  - Monitor and report changes from baseline  Collaborate with interdisciplinary team to initiate plan and implement interventions as ordered  - Provide and maintain a safe environment  - Consider seizure precautions  - Consider fall precautions  - Consider aspiration precautions  - Consider bleeding precautions  5/10/2023 1708 by Swapna Solorzano RN  Outcome: Progressing       Problem: Activity Intolerance/Impaired Mobility  Goal: Mobility/activity is maintained at optimum level for patient  Description: Interventions:  - Assess and monitor patient  barriers to mobility and need for assistive/adaptive devices    - Assess patient's emotional response to limitations  - Collaborate with interdisciplinary team and initiate plans and interventions as ordered  - Encourage independent activity per ability   - Maintain proper body alignment  - Perform active/passive rom as tolerated/ordered  - Plan activities to conserve energy   - Turn patient as appropriate  5/10/2023 1708 by Demetrice Allen RN  Outcome: Progressing       Problem: Communication Impairment  Goal: Ability to express needs and understand communication  Description: Assess patient's communication skills and ability to understand information  Patient will demonstrate use of effective communication techniques, alternative methods of communication and understanding even if not able to speak  - Encourage communication and provide alternate methods of communication as needed  - Collaborate with case management/ for discharge needs  - Include patient/family/caregiver in decisions related to communication  5/10/2023 1708 by Demetrice Allen RN  Outcome: Progressing    Problem: Potential for Aspiration  Goal: Non-ventilated patient's risk of aspiration is minimized  Description: Assess and monitor vital signs, respiratory status, and labs (WBC)  Monitor for signs of aspiration (tachypnea, cough, rales, wheezing, cyanosis, fever)  - Assess and monitor patient's ability to swallow  - Place patient up in chair to eat if possible  - HOB up at 90 degrees to eat if unable to get patient up into chair   - Supervise patient during oral intake  - Instruct patient/ family to take small bites  - Instruct patient/ family to take small single sips when taking liquids  - Follow patient-specific strategies generated by speech pathologist   5/10/2023 1708 by Demetrice Allen RN  Outcome: Progressing    Goal: Ventilated patient's risk of aspiration is minimized  Description: Assess and monitor vital signs, respiratory status, airway cuff pressure, and labs (WBC)    Monitor for signs of aspiration (tachypnea, cough, rales, wheezing, cyanosis, fever)  - Elevate head of bed 30 degrees if patient has tube feeding   - Monitor tube feeding  5/10/2023 1708 by Carlyn Diaz RN  Outcome: Progressing       Problem: Nutrition  Goal: Nutrition/Hydration status is improving  Description: Monitor and assess patient's nutrition/hydration status for malnutrition (ex- brittle hair, bruises, dry skin, pale skin and conjunctiva, muscle wasting, smooth red tongue, and disorientation)  Collaborate with interdisciplinary team and initiate plan and interventions as ordered  Monitor patient's weight and dietary intake as ordered or per policy  Utilize nutrition screening tool and intervene per policy  Determine patient's food preferences and provide high-protein, high-caloric foods as appropriate  - Assist patient with eating   - Allow adequate time for meals   - Encourage patient to take dietary supplement as ordered  - Collaborate with clinical nutritionist   - Include patient/family/caregiver in decisions related to nutrition    5/10/2023 1708 by Carlyn Diaz RN  Outcome: Progressing

## 2023-05-11 LAB
ANION GAP SERPL CALCULATED.3IONS-SCNC: 7 MMOL/L (ref 4–13)
B BURGDOR IGG+IGM SER-ACNC: <0.2 AI
BUN SERPL-MCNC: 15 MG/DL (ref 5–25)
CALCIUM SERPL-MCNC: 9 MG/DL (ref 8.4–10.2)
CHLORIDE SERPL-SCNC: 104 MMOL/L (ref 96–108)
CO2 SERPL-SCNC: 27 MMOL/L (ref 21–32)
CREAT SERPL-MCNC: 0.61 MG/DL (ref 0.6–1.3)
ERYTHROCYTE [DISTWIDTH] IN BLOOD BY AUTOMATED COUNT: 13.6 % (ref 11.6–15.1)
GFR SERPL CREATININE-BSD FRML MDRD: 97 ML/MIN/1.73SQ M
GLUCOSE SERPL-MCNC: 92 MG/DL (ref 65–140)
HCT VFR BLD AUTO: 38.5 % (ref 34.8–46.1)
HGB BLD-MCNC: 12.2 G/DL (ref 11.5–15.4)
MCH RBC QN AUTO: 28.4 PG (ref 26.8–34.3)
MCHC RBC AUTO-ENTMCNC: 31.7 G/DL (ref 31.4–37.4)
MCV RBC AUTO: 90 FL (ref 82–98)
PLATELET # BLD AUTO: 288 THOUSANDS/UL (ref 149–390)
PMV BLD AUTO: 10.4 FL (ref 8.9–12.7)
POTASSIUM SERPL-SCNC: 3.7 MMOL/L (ref 3.5–5.3)
RBC # BLD AUTO: 4.29 MILLION/UL (ref 3.81–5.12)
SODIUM SERPL-SCNC: 138 MMOL/L (ref 135–147)
WBC # BLD AUTO: 7.26 THOUSAND/UL (ref 4.31–10.16)

## 2023-05-11 RX ORDER — MINERAL OIL AND PETROLATUM 150; 830 MG/G; MG/G
OINTMENT OPHTHALMIC
Status: DISCONTINUED | OUTPATIENT
Start: 2023-05-11 | End: 2023-05-11 | Stop reason: HOSPADM

## 2023-05-11 RX ORDER — VALACYCLOVIR HYDROCHLORIDE 1 G/1
1000 TABLET, FILM COATED ORAL 3 TIMES DAILY
Qty: 21 TABLET | Refills: 0 | Status: SHIPPED | OUTPATIENT
Start: 2023-05-11 | End: 2023-05-18

## 2023-05-11 RX ORDER — PREDNISONE 20 MG/1
60 TABLET ORAL DAILY
Qty: 21 TABLET | Refills: 0 | Status: SHIPPED | OUTPATIENT
Start: 2023-05-11 | End: 2023-05-18

## 2023-05-11 RX ORDER — MINERAL OIL AND PETROLATUM 150; 830 MG/G; MG/G
OINTMENT OPHTHALMIC ONCE
Status: COMPLETED | OUTPATIENT
Start: 2023-05-11 | End: 2023-05-11

## 2023-05-11 RX ORDER — ARTIFICIAL TEARS 1; 2; 3 MG/ML; MG/ML; MG/ML
1 SOLUTION/ DROPS OPHTHALMIC 3 TIMES DAILY PRN
Qty: 15 ML | Refills: 0 | Status: SHIPPED | OUTPATIENT
Start: 2023-05-11 | End: 2024-05-10

## 2023-05-11 RX ADMIN — Medication 1000 UNITS: at 09:04

## 2023-05-11 RX ADMIN — ASPIRIN 81 MG: 81 TABLET, COATED ORAL at 09:04

## 2023-05-11 RX ADMIN — PANTOPRAZOLE SODIUM 40 MG: 40 TABLET, DELAYED RELEASE ORAL at 05:39

## 2023-05-11 RX ADMIN — WHITE PETROLATUM 57.7 %-MINERAL OIL 31.9 % EYE OINTMENT: at 10:42

## 2023-05-11 RX ADMIN — SENNOSIDES 8.6 MG: 8.6 TABLET, FILM COATED ORAL at 09:03

## 2023-05-11 RX ADMIN — CYANOCOBALAMIN TAB 500 MCG 1000 MCG: 500 TAB at 09:04

## 2023-05-11 RX ADMIN — CLOPIDOGREL BISULFATE 75 MG: 75 TABLET ORAL at 09:04

## 2023-05-11 NOTE — OCCUPATIONAL THERAPY NOTE
"  Occupational Therapy Evaluation      Ananth Balbuena    5/11/2023    Principal Problem:    Stroke-like symptoms  Active Problems:    Obesity    Headache      Past Medical History:   Diagnosis Date    Abdominal pain     \"burning pain\" came to the ED    Renal disorder     Wears dentures     permanent upper denture    Weight loss     20 lb wt loss in 2-3 weeks d/t stomach issues       Past Surgical History:   Procedure Laterality Date    CHOLECYSTECTOMY      TUBAL LIGATION      at 36         05/11/23 1130   OT Last Visit   OT Visit Date 05/11/23   Note Type   Note type Evaluation   Pain Assessment   Pain Assessment Tool 0-10   Pain Score No Pain   Restrictions/Precautions   Other Precautions Telemetry; Fall Risk   Home Living   Type of [address] [place] [address] Two level;Bed/bath upstairs   Additional Comments lives with spouse  Daughter lives next door  Prior Function   Level of Dent Independent with ADLs; Independent with functional mobility   Lives With [address] in the last 6 months 0   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; 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))   Lifestyle   Autonomy PTA, pt (I) with all ADLs/IADLs  Reciprocal Relationships SUpportive family  Daughter lives  next door  General   Family/Caregiver Present Yes  (daughter present and translating questions to patient  Pt able to speak some English to respond )   Subjective   Subjective \"Water drips  out of my mouth\"   ADL   Where Assessed Chair   Grooming Assistance 5  Supervision/Setup   Grooming Deficit Teeth care; Increased time to complete;Supervision/safety  (When rinsing mouth, pt with (+)spillage from left side of mouth )   UB Bathing Assistance 5  Supervision/Setup   LB Bathing Assistance 5  Supervision/Setup   UB Dressing Assistance 7  Independent   LB Dressing Assistance 5  Supervision/Setup   Toileting Assistance  5  Supervision/Setup   Bed Mobility   Supine to " "Sit 7  Independent   Sit to Supine 7  Independent   Transfers   Sit to Stand 7  Independent   Stand to Sit 7  Independent   Functional Mobility   Functional Mobility 5  Supervision   Additional Comments Pt's daughter present in room and reports pt does not appear to  be ambulating like normal  Pt reports feeling \"fine\"  No LOB noted during mobility within room  Balance   Static Sitting Normal   Dynamic Sitting Good   Static Standing Fair +   Dynamic Standing Fair   Activity Tolerance   Activity Tolerance Patient tolerated treatment well;Patient limited by fatigue   Nurse Made Aware RN cleared pt for OT evaluation   RUE Assessment   RUE Assessment WFL  (however reports (R) UE to feel weaker  [facility])   LUE Assessment   LUE Assessment WFL   Hand Function   Hand Function Comments Pt reports noticing hand weaker when trying to use knife to cut food  Pt is (L) hand dominant and therefore weakness noticed is not impacting overall ability to perform necessary ADLs  OT educated pt on fine motor activities to perform at home while waiting to go to outpatient therapy  Sensation   Light Touch No apparent deficits   Vision-Basic Assessment   Current Vision Wears glasses only for reading   Vision - Complex Assessment   Ocular Range of Motion Intact   Tracking Intact   Acuity Able to read clock/calendar on wall without difficulty; Able to read employee name badge without difficulty   Psychosocial   Psychosocial (WDL) WDL   Cognition   Overall Cognitive Status WFL   Arousal/Participation Alert; Cooperative   Attention Within functional limits   Orientation Level Oriented X4   Memory Within functional limits   Following Commands Follows one step commands without difficulty   Comments Pt verified ID by stating name and    Assessment   Limitation Decreased UE strength;Decreased endurance;Decreased fine motor control;Decreased self-care trans;Decreased high-level ADLs   Goals   Patient Goals Return home, fix " mouth, get stronger   LTG Time Frame 7-10   Long Term Goal #1 see goals listed below   Plan   Treatment Interventions UE strengthening/ROM; Endurance training;ADL retraining;Functional transfer training;Patient/family training;Neuromuscular reeducation;Equipment evaluation/education; Fine motor coordination activities; Activityengagement   Goal Expiration Date 05/21/23   OT Frequency 3-5x/wk   Recommendation   OT Discharge Recommendation Home with outpatient rehabilitation   AM-PAC Daily Activity Inpatient   Lower Body Dressing 3   Bathing 3   Toileting 4   Upper Body Dressing 4   Grooming 4   Eating 4   Daily Activity Raw Score 22   Daily Activity Standardized Score (Calc for Raw Score >=11) 47  1   AM-PAC Applied Cognition Inpatient   Following a Speech/Presentation 4   Understanding Ordinary Conversation 4   Taking Medications 4   Remembering Where Things Are Placed or Put Away 4   Remembering List of 4-5 Errands 4   Taking Care of Complicated Tasks 4   Applied Cognition Raw Score 24   Applied Cognition Standardized Score 62 21   End of Consult   Education Provided Yes;Family or social support of family present for education by provider   Patient Position at End of Consult All needs within reach; Bedside chair   Nurse Communication Nurse aware of consult     Assessment  Pt is a 59 y o  female seen for OT evaluation s/p admission to [address] [place] [address] on 5/10/2023  with diagnoses Stroke-like symptoms  Pt has a significant PMH impacting occupational performance, which is listed above  PTA pt living with spouse and was completely (I) PTA with ADls and functional mobility  Pt is motivated to return to home as well as to get stronger  Personal and environmental factors supporting pt at time of IE include age, social support, and attitude towards recovery   Personal and environmental factors inhibiting engagement in occupations include  difficulty performing fine motor tasks, difficulty with performing necessary tasks for employment (fine motor tasks)   During evaluation pt performed as is outlined above in flowsheet  Pt required  supervision for functional mobility but was (I) with sit<>stand transfers from various surfaces  Pt also (S) for higher level ADL such as bathing and LB dressing  No LOB noted during evaluation   Standardized assessments used to assist in identifying performance deficits include AMPA 6-Clicks  Performance deficits that affect the pt’s occupational performance during the initial evaluation include impaired functional mobility, endurance, fine motor coordination, forward functional reach, and overall strength  Based on pt’s functional performance and deficits the following occupations will be addressed in OT treatments in order to maximize pt’s independence and overall occupational performance: functional mobility, meal preparation, medication management, household preparation, and work related tasks   Goals are listed below  Upon discharge from acute care setting recommend d/c to Home with [address] [place] [address]  This evaluation required an extensive review of medical and/or therapy records and additional review of physical, cognitive and psychosocial history related to functional performance  Based upon functional performance deficits and assessments, this evaluation has been identified as a moderate complexity evaluation      GOALS:    Pt will achieve the following within specified time frame:  *Perform ADL transfers (I)ly for inc'd independence with ADLs/purposeful tasks    *Perform LB ADL (I)ly for inc'd independence with self cares    *Increase static stand balance to Good and dyn stand balance to Good for inc'd safety with standing purposeful tasks    *Increase stand tolerance x7-10m for inc'd tolerance with standing purposeful tasks    *Perform clothing management/hygiene for toileting (I)ly for inc'd independence with self cares    *Participate in 10m UE therex to improve fine motor skills and strength by 1/2 grade in order to increase overall performance with ADls, IADls, and work related tasks      *Perform tub/shower transfer using most appropriate technique (step in vs  Tub seat/transfer bench) mod (I) for inc'd safety and independence with bathing    *Perform sinkside G&H (I)ly, with good safety and Good balance for inc'd independence with self cares    Luis Romero, OT

## 2023-05-11 NOTE — DISCHARGE INSTR - AVS FIRST PAGE
Dear Reji Webster,     It was our pleasure to care for you here at [facility]  It is our hope that we were always able to exceed the expected standards for your care during your stay  You were hospitalized due to bell's palsy  You were cared for on the 3rd floor by Joanne Bains MD under the service of [address] [facility] MD Jalen with the [facility] who covers for your primary care physician (PCP), Juan Antonio Pate PA-C, while you were hospitalized  If you have any questions or concerns related to this hospitalization, you may contact us at [phone]  For follow up as well as any medication refills, we recommend that you follow up with your primary care physician  A registered nurse will reach out to you by phone within a few days after your discharge to answer any additional questions that you may have after going home  However, at this time we provide for you here, the most important instructions / recommendations at discharge:     Notable Medication Adjustments -   Please start taking prednisone 60 mg and Valtrex 1000 mg for 7 days  Testing Required after Discharge -   none  ** Please contact your PCP to request testing orders for any of the testing recommended here **  Important follow up information -   Please follow up with PCP  Other Instructions -   May use eye drops for dryness and tearing  Can use eye patch to prevent severe dryness of the eye  Please review this entire after visit summary as additional general instructions including medication list, appointments, activity, diet, any pertinent wound care, and other additional recommendations from your care team that may be provided for you        Sincerely,     Joanne Bains MD

## 2023-05-11 NOTE — DISCHARGE SUMMARY
[facility]  Discharge- Izabella Wisdom 1960, 59 y o  female MRN: [id number]  Unit/Bed#: S -01 Encounter: [id number]  Primary Care Provider: Angela Lozano PA-C   Date and time admitted to hospital: 5/10/2023  7:25 AM    * Stroke-like symptoms  Assessment & Plan  · Right-sided facial weakness, numbness and tingling in right arm and bilateral hands  No motor weakness  · Stroke alert in the ED  · Neurology was consulted  Not offered TNK due to being outside the window  · Loaded with aspirin and Plavix  · CT head: no acute intracranial abnormalities  · CTA head and neck: no high grade stenosis, or aneurysm  MRI pending  ECHO pending    No know risk factor other than age at this time    MRI of the brain is negative  Most likely Bell's palsy  Neurology recommended prednisone 60 mg and Valtrex for 7 days  Stable for discharge      Headache  Assessment & Plan  States that earlier today she started developing a posterior headache  Reports significant improvement after migraine cocktail    Obesity  Assessment & Plan  Encourage lifestyle modification        Medical Problems     Resolved Problems  Date Reviewed: 5/11/2023   None       Discharging Resident: Marvin Mills MD  Discharging Attending: George Mejias MD  PCP: Angela Lozano PA-C  Admission Date:   Admission Orders (From admission, onward)     Ordered        05/10/23 0851  INPATIENT ADMISSION  Once                      Discharge Date: 05/11/23    Consultations During Hospital Stay:  · neurology    Procedures Performed:   · None    Significant Findings / Test Results:   X-ray chest 1 view portable    Result Date: 5/10/2023  Impression: No acute cardiopulmonary disease  Workstation performed: [id number]     MRI brain w wo contrast    Result Date: 5/10/2023  Impression: No mass effect, acute intracranial hemorrhage or evidence of recent infarction  No abnormal parenchymal or leptomeningeal enhancement identified  Workstation performed: [id number]     CT stroke alert brain    Result Date: 5/10/2023  Impression: No mass effect, acute intracranial hemorrhage or evidence of recent infarction  Findings were directly discussed with Sue Padilla  at 8:14 a m  Workstation performed: [id number]     CTA stroke alert (head/neck)    Result Date: 5/10/2023  Impression: No evidence for high-grade stenosis, focal occlusion or vascular aneurysm of the cervical or intracranial vessels  Findings were directly discussed with Sue Padilla  at 8:14 a m  Workstation performed: [id number]       X-ray chest 1 view portable    Result Date: 5/10/2023  Impression No acute cardiopulmonary disease  Workstation performed: [id number]   ·      Incidental Findings:   · As noted above  · I reviewed the above mentioned incidental findings with the patient and/or family and they expressed understanding  Test Results Pending at Discharge (will require follow up):  · none     Outpatient Tests Requested:  · none    Complications:  none    Reason for Admission: Right facial droop    Hospital Course:   Samm Sharma is a 59 y o  female patient who originally presented to the hospital on 5/10/2023 due to facial droop and numbness and tingling  MRI of the brain, CT head, CTA of the head and neck, were unremarkable  Per neurology evaluation, she likely has Bell's palsy  On presentation she is still had facial droop but she was tolerating diet well and speech was okay  She is medically stable to be discharged with prednisone and Valtrex for 7 days per neurology recommendation  Please see above list of diagnoses and related plan for additional information       Condition at Discharge: stable    Discharge Day Visit / Exam:   Subjective: Right facial droop, tearing of the right eye  Vitals: Blood Pressure: 127/77 (05/11/23 0730)  Pulse: 66 (05/11/23 0730)  Temperature: 97 7 °F (36 5 °C) (05/11/23 0730)  Temp Source: Oral (05/11/23 0730)  Respirations: 16 (05/11/23 "0730)  Height: 4' 11\" (149 9 cm) (05/10/23 1338)  Weight - Scale: 75 3 kg (166 lb) (05/10/23 1338)  SpO2: 95 % (05/11/23 0130)  Exam:   Physical Exam  Vitals and nursing note reviewed  Constitutional:       General: She is not in acute distress  Appearance: Normal appearance  She is well-developed  HENT:      Head: Normocephalic and atraumatic  Eyes:      Conjunctiva/sclera: Conjunctivae normal    Cardiovascular:      Rate and Rhythm: Normal rate and regular rhythm  Heart sounds: No murmur heard  Pulmonary:      Effort: Pulmonary effort is normal  No respiratory distress  Breath sounds: Normal breath sounds  Abdominal:      Palpations: Abdomen is soft  Tenderness: There is no abdominal tenderness  Musculoskeletal:         General: No swelling  Cervical back: Neck supple  Skin:     General: Skin is warm and dry  Capillary Refill: Capillary refill takes less than 2 seconds  Neurological:      Mental Status: She is alert and oriented to person, place, and time  Cranial Nerves: Cranial nerve deficit (right facial droop with weakness of the right eyelid and tearing  ) present  Sensory: No sensory deficit  Motor: No weakness  Psychiatric:         Mood and Affect: Mood normal           Discussion with Family: Updated  (daughter) via phone  Discharge instructions/Information to patient and family:   See after visit summary for information provided to patient and family  Provisions for Follow-Up Care:  See after visit summary for information related to follow-up care and any pertinent home health orders  Disposition:   Home      Tobacco and Toxic Substance Assessment and Intervention:     Tobacco use screening performed    Alcohol and drug use screening performed      Planned Readmission: None    Discharge Medications:  See after visit summary for reconciled discharge medications provided to patient and/or family        **Please Note: " This note may have been constructed using a voice recognition system**

## 2023-05-11 NOTE — PLAN OF CARE
Problem: OCCUPATIONAL THERAPY ADULT  Goal: Performs self-care activities at highest level of function for planned discharge setting  See evaluation for individualized goals  Description: Treatment Interventions: UE strengthening/ROM, Endurance training, ADL retraining, Functional transfer training, Patient/family training, Neuromuscular reeducation, Equipment evaluation/education, Fine motor coordination activities, Activityengagement          See flowsheet documentation for full assessment, interventions and recommendations  Outcome: Progressing  Note: Limitation: Decreased UE strength, Decreased endurance, Decreased fine motor control, Decreased self-care trans, Decreased high-level ADLs  Prognosis: Good  Assessment: Pt is a 59 y o  female seen for OT evaluation s/p admission to [address] [place] [address] on 5/10/2023  with diagnoses Stroke-like symptoms  Pt has a significant PMH impacting occupational performance, which is listed above  PTA pt living with spouse and was completely (I) PTA with ADls and functional mobility  Pt is motivated to return to home as well as to get stronger  Personal and environmental factors supporting pt at time of IE include age, social support, and attitude towards recovery  Personal and environmental factors inhibiting engagement in occupations include  difficulty performing fine motor tasks, difficulty with performing necessary tasks for employment (fine motor tasks)   During evaluation pt performed as is outlined above in flowsheet  Pt required  supervision for functional mobility but was (I) with sit<>stand transfers from various surfaces  Pt also (S) for higher level ADL such as bathing and LB dressing  No LOB noted during evaluation   Standardized assessments used to assist in identifying performance deficits include [facility] 6-Clicks   Performance deficits that affect the pt’s occupational performance during the initial evaluation include impaired functional mobility, endurance, fine motor coordination, forward functional reach, and overall strength  Based on pt’s functional performance and deficits the following occupations will be addressed in OT treatments in order to maximize pt’s independence and overall occupational performance: functional mobility, meal preparation, medication management, household preparation, and work related tasks   Goals are listed below  Upon discharge from acute care setting recommend d/c to Home with [facility]  This evaluation required an extensive review of medical and/or therapy records and additional review of physical, cognitive and psychosocial history related to functional performance  Based upon functional performance deficits and assessments, this evaluation has been identified as a moderate complexity evaluation       OT Discharge Recommendation: Home with outpatient rehabilitation

## 2023-05-11 NOTE — PHYSICAL THERAPY NOTE
"                                                                                  PHYSICAL THERAPY EVALUATION NOTE    Patient Name: Sameer Speaker  YRN Date: 5/11/2023  AGE:   59 y o  Mrn:   [id number]  ADMIT DX:  Hypokalemia [E87 6]  Epigastric pain [R10 13]  Facial droop [R29 810]  Stroke [facility]) [I63 9]  Screen for colon cancer [Z12 11]  Liver cyst [K76 89]  Arm numbness [R20 0]  Screening for breast cancer [Z12 39]  Encounter for immunization [Z23]  Atypical squamous cells of undetermined significance on cytologic smear of cervix (ASC-US) [R87 610]  Pain of upper abdomen [R10 10]  Stroke-like symptoms [R29 90]  Depression, unspecified depression type [F32  A]    Past Medical History:   Diagnosis Date    Abdominal pain     \"burning pain\" came to the ED    Renal disorder     Wears dentures     permanent upper denture    Weight loss     20 lb wt loss in 2-3 weeks d/t stomach issues     Length Of Stay: 1  PHYSICAL THERAPY EVALUATION :    05/11/23 0825   PT Last Visit   PT Visit Date 05/11/23   Pain Assessment   Pain Assessment Tool 0-10   Pain Score No Pain   Restrictions/Precautions   Other Precautions Telemetry; Fall Risk   Home Living   Type of [address] [place] [address] Two level;Bed/bath upstairs   Additional Comments lives w/ spouse  ambulates w/o device  no DME  independent w/ ADLs  no falls in last 6 months  pt works a sedentary job  General   Additional Pertinent History room air resting pulse ox 95% and 73 BPM, active 95% and 84 BPM    Family/Caregiver Present No   Cognition   Arousal/Participation Alert   Orientation Level Oriented to person; Other (Comment)  (pt was identified w/ full name, birth date)   Following Commands Follows one step commands without difficulty   Subjective   Subjective pt seen sitting on edge of bed  agreed to PT eval  denied pain or dizziness  pt states having right facial weakness and R UE weakness   (pt is mostly Frisian speaking   utilized [employer]  [id number] )   RUE " Assessment   RUE Assessment WFL   LUE Assessment   LUE Assessment WFL   RLE Assessment   RLE Assessment WFL  (4- to 4/5)   LLE Assessment   LLE Assessment WFL  (4 to 4+/5)   Vision-Basic Assessment   Current Vision Wears glasses only for reading   Coordination   Movements are Fluid and Coordinated 1   Sensation X  (light touch impaired right foot)   Finger to Nose & Finger to Finger  Intact   Heel to Shin Intact   Bed Mobility   Supine to Sit 7  Independent   Additional items HOB elevated; Increased time required   Sit to Supine 7  Independent   Additional items HOB elevated; Increased time required   Transfers   Sit to Stand 7  Independent   Additional items Increased time required   Stand to Sit 7  Independent   Additional items Increased time required   Ambulation/Elevation   Gait pattern Foward flexed; Short stride;Decreased R stance   Gait Assistance 5  Supervision   Additional items Verbal cues  (for full step length)   Assistive Device None   Distance 180 feet  (additional not possible due to fatigue)   Stair Management Assistance 5  Supervision   Additional items Verbal cues; Increased time required  (for consistent railing use)   Stair Management Technique One rail L;Other (Comment)  (step to pattern up, alternating pattern down)   Number of Stairs 5   Ambulation/Elevation Additional Comments Comfortable Gait Speed: 0 53 m/s   Balance   Static Sitting Normal   Dynamic Sitting Good   Static Standing Fair +   Dynamic Standing Fair   Ambulatory Fair -   Activity Tolerance   Activity Tolerance Patient limited by fatigue   Nurse Made Aware spoke to [id number]   Assessment   Problem List Decreased strength;Decreased endurance; Impaired balance;Decreased mobility; Decreased safety awareness; Impaired sensation   Assessment Pt was in her bathroom preparing for work, she noticed that water was dripping from the side her mother  Also was experiencing some numbness and tingling in right arm and hand   Reports that the right side of her face felt weak  Dx: stroke-like symptoms  order placed for PT eval and tx, w/ activity order of up and out of bed as tolerated  pt presents w/ comorbidities of depression and osteoporosis and personal factors of living in 2 story house and preferred language not [place] (language barrier)  pt presents w/ weakness, decreased endurance, impaired balance, gait deviations, altered sensation, decreased safety awareness and fall risk  these impairments are evident in findings from physical examination (weakness and altered sensation), mobility assessment (need for standby assist w/ gait and stair use when usually mobilizing independently, tolerance to only 180 feet of ambulation and need for cueing for mobility technique), and Comfortable Gait Speed: 0 53 m/s (less than 1 0 m/s indicates need for intervention to address falls risk)  pt needed input for mobility technique  pt is at risk for falls due to physical and safety awareness deficits  pt's clinical presentation is unstable/unpredictable (evident in need for standby assist w/ ambulation and stair use when usually mobilizing independently, tolerance to only 180 feet of ambulation and need for input for mobility technique/safety)  pt needs inpatient PT tx to improve mobility deficits and progress mobility training as appropriate  discharge recommendation is for outpatient PT to improve activity tolerance and maximize level of functional independence  Goals   Patient Goals fix the problems with my mouth  get stronger     STG Expiration Date 05/21/23   Short Term Goal #1 pt will: Ambulate 400 ft  with least restrictive assistive device independently w/o LOB to improve functional independence, Navigate 10 stair(s) independently with unilateral handrail to facilitate return to previous living environment, Increase ambulatory balance 1 grade to decrease risk for falls, Tolerate 3 hr OOB to faciliate upright tolerance, Improve gait speed to 0 74 m/s to reduce fall risk and increase independence and Improve Barthel Index score to 100 to facilitate independence   PT Treatment Day 1   Plan   Treatment/Interventions Elevations;LE strengthening/ROM; Therapeutic exercise; Endurance training;Patient/family training;Equipment eval/education;Gait training   PT Frequency 1-2x/wk   Recommendation   PT Discharge Recommendation Home with outpatient rehabilitation   Additional Comments recommend ambulation w/ single point cane   AM-PAC Basic Mobility Inpatient   Turning in Flat Bed Without Bedrails 4   Lying on Back to Sitting on Edge of Flat Bed Without Bedrails 4   Moving Bed to Chair 4   Standing Up From Chair Using Arms 4   Walk in Room 3   Climb 3-5 Stairs With Railing 3   Basic Mobility Inpatient Raw Score 22   Basic Mobility Standardized Score 47 4   Highest Level Of Mobility   JH-HLM Goal 7: Walk 25 feet or more   JH-HLM Achieved 8: Walk 250 feet ot more   Barthel Index   Feeding 10   Bathing 5   Grooming Score 5   Dressing Score 5   Bladder Score 10   Bowels Score 10   Toilet Use Score 10   Transfers (Bed/Chair) Score 15   Mobility (Level Surface) Score 10   Stairs Score 5   Barthel Index Score 85   Additional Treatment Session   Start Time 0825   End Time 0835   Treatment Assessment Pt agreed to participate in PT intervention  Therapist introduced single point cane use w/ ambulation to improve gait stability  Sit <---> stand transfer independent  Ambulated 220 w/ cane independently  Comfortable Gait Speed: 0 64 m/s  Pt was noted to have improvement w/ use of cane w/ increased ambulation tolerance and gait speed  continued inpatient PT tx is indicated to reduce fall risk  Equipment Use single point cane   Additional Treatment Day 1   End of Consult   Patient Position at End of Consult Bedside chair; All needs within reach     Comfortable Gait Speed: w/o device 0 53 m/s, w/ single point cane 0 64 m/s  Gait Speed Interpretation:  Gain of 0 1 m/s is a predictor of well-being in those w/ abnormal walking speed compared to age-patched peers  <0 7m/s is at an increased risk for falls    Household ambulator: <0 4 m/s  Limited community ambulator: 0 4-0 8 m/s  Community ambulator: 0 8-1 2 m/s  Able to safely cross streets: >1 2 m/s     The patient's AM-PAC Basic Mobility Inpatient Short Form Raw Score is 22  A Raw score of greater than 16 suggests the patient may benefit from discharge to home  Please also refer to the recommendation of the Physical Therapist for safe discharge planning  Skilled PT recommended while in hospital and upon DC to progress pt toward treatment goals       Raul Lacy, PT

## 2023-05-11 NOTE — PLAN OF CARE
Problem: PAIN - ADULT  Goal: Verbalizes/displays adequate comfort level or baseline comfort level  Description: Interventions:  - Encourage patient to monitor pain and request assistance  - Assess pain using appropriate pain scale  - Administer analgesics based on type and severity of pain and evaluate response  - Implement non-pharmacological measures as appropriate and evaluate response  - Consider cultural and social influences on pain and pain management  - Notify physician/advanced practitioner if interventions unsuccessful or patient reports new pain  Outcome: Adequate for Discharge     Problem: SAFETY ADULT  Goal: Patient will remain free of falls  Description: INTERVENTIONS:  - Educate patient/family on patient safety including physical limitations  - Instruct patient to call for assistance with activity   - Consult OT/PT to assist with strengthening/mobility   - Keep Call bell within reach  - Keep bed low and locked with side rails adjusted as appropriate  - Keep care items and personal belongings within reach  - Initiate and maintain comfort rounds  - Make Fall Risk Sign visible to staff  - Offer Toileting,  in advance of need  - Initiate/Maintain alarm  - Obtain necessary fall risk management equipment:   - Apply yellow socks and bracelet for high fall risk patients  - Consider moving patient to room near nurses station  Outcome: Adequate for Discharge  Goal: Maintain or return to baseline ADL function  Description: INTERVENTIONS:  -  Assess patient's ability to carry out ADLs; assess patient's baseline for ADL function and identify physical deficits which impact ability to perform ADLs (bathing, care of mouth/teeth, toileting, grooming, dressing, etc )  - Assess/evaluate cause of self-care deficits   - Assess range of motion  - Assess patient's mobility; develop plan if impaired  - Assess patient's need for assistive devices and provide as appropriate  - Encourage maximum independence but intervene and supervise when necessary  - Involve family in performance of ADLs  - Assess for home care needs following discharge   - Consider OT consult to assist with ADL evaluation and planning for discharge  - Provide patient education as appropriate  Outcome: Adequate for Discharge  Goal: Maintains/Returns to pre admission functional level  Description: INTERVENTIONS:  - Perform BMAT or MOVE assessment daily    - Set and communicate daily mobility goal to care team and patient/family/caregiver  - Collaborate with rehabilitation services on mobility goals if consulted  - Perform Range of Motion   - Reposition patient  - Dangle patient   - Stand patient   - Ambulate patient   - Out of bed to chair   - Out of bed for meals   - Out of bed for toileting  - Record patient progress and toleration of activity level   Outcome: Adequate for Discharge     Problem: Neurological Deficit  Goal: Neurological status is stable or improving  Description: Interventions:  - Monitor and assess patient's level of consciousness, motor function, sensory function, and level of assistance needed for ADLs  - Monitor and report changes from baseline  Collaborate with interdisciplinary team to initiate plan and implement interventions as ordered  - Provide and maintain a safe environment  - Consider seizure precautions  - Consider fall precautions  - Consider aspiration precautions  - Consider bleeding precautions  Outcome: Adequate for Discharge     Problem: Activity Intolerance/Impaired Mobility  Goal: Mobility/activity is maintained at optimum level for patient  Description: Interventions:  - Assess and monitor patient  barriers to mobility and need for assistive/adaptive devices  - Assess patient's emotional response to limitations  - Collaborate with interdisciplinary team and initiate plans and interventions as ordered  - Encourage independent activity per ability   - Maintain proper body alignment    - Perform active/passive rom as tolerated/ordered  - Plan activities to conserve energy   - Turn patient as appropriate  Outcome: Adequate for Discharge     Problem: Communication Impairment  Goal: Ability to express needs and understand communication  Description: Assess patient's communication skills and ability to understand information  Patient will demonstrate use of effective communication techniques, alternative methods of communication and understanding even if not able to speak  - Encourage communication and provide alternate methods of communication as needed  - Collaborate with case management/ for discharge needs  - Include patient/family/caregiver in decisions related to communication  Outcome: Adequate for Discharge     Problem: Potential for Aspiration  Goal: Non-ventilated patient's risk of aspiration is minimized  Description: Assess and monitor vital signs, respiratory status, and labs (WBC)  Monitor for signs of aspiration (tachypnea, cough, rales, wheezing, cyanosis, fever)  - Assess and monitor patient's ability to swallow  - Place patient up in chair to eat if possible  - HOB up at 90 degrees to eat if unable to get patient up into chair   - Supervise patient during oral intake  - Instruct patient/ family to take small bites  - Instruct patient/ family to take small single sips when taking liquids  - Follow patient-specific strategies generated by speech pathologist   Outcome: Adequate for Discharge  Goal: Ventilated patient's risk of aspiration is minimized  Description: Assess and monitor vital signs, respiratory status, airway cuff pressure, and labs (WBC)  Monitor for signs of aspiration (tachypnea, cough, rales, wheezing, cyanosis, fever)  - Elevate head of bed 30 degrees if patient has tube feeding   - Monitor tube feeding    Outcome: Adequate for Discharge

## 2023-05-11 NOTE — PROGRESS NOTES
Progress Note - Neurology   Rey Fuentes 59 y o  female MRN: [id number]  Unit/Bed#: S -01 Encounter: [id number]      Assessment/Plan   * Stroke-like symptoms  Assessment & Plan  Rey Fuentes is a 59 y o  female with GERD, depression, osteoporosis who presents to [facility] ED on 5/10/2023 as a stroke alert with right facial weakness, right upper and lower extremity paresthesia, and posterior headache  BP on presentation: Elevated 191/84  NIHSS of 3 for right facial weakness and right hemisensory deficit  CTH and CTA head and neck unremarkable  Patient was not an IV TNK candidate as patient was out of the time window  Workup:  · CT head: Unremarkable for acute intracranial abnormalities  · CTA head and neck: Unremarkable for large vessel occlusion/critical stenosis  · MRI brain w wo: Unremarkable for acute infarct, hemorrhage, or abnormal enhancement   · Echo: EF 55%, wall motion normal; bilateral atrium size WNL  · Hemoglobin A1c: 5 6  · Lipid panel: Total cholesterol 187, Triglycerides elevated 282, HDL 51, LDL 80    Worsening of right facial weakness, now with new decreased blinking of right eye, weakness with right eyelid closure, and diminished forehead wrinkles on the right, concerning for Bell's palsy  Etiology of Bell's palsy unclear, will test for Lyme  MRI brain unremarkable for evidence of acute infarct  Unlikely TIA as symptoms have persisted > 24 hours      Plan:  - No further neuroimaging needed at this time  - Pending: Lyme total antibody profile  - S/p load with  mg x 1 and Plavix 300 mg x 1  - Okay to discontinue dual antiplatelet therapy: ASA and Plavix  - Will defer statin therapy to primary team as triglycerides were elevated   - S/p migraine cocktail on presentation: Benadryl 25 mg x1, Toradol 15 mg x1, Reglan 10 mg x1  - Recommend discharging on prednisone 60 mg x 1 week and valacyclovir 1000 mg TID is 1 week  - Recommend artificial tears on discharge  - Goal normotension, "euglycemia, normothermia   - PT/OT/Speech   - Medical management and supportive care per primary team, notify with changes  - Patient to follow-up with outpatient neurology in 6-8 weeks    Sy Reagan will need follow up in 6-8 weeks with general attending or advance practitioner  She will not require outpatient neurological testing  Subjective: Today patient reports worsening of the right facial weakness and states she is having trouble closing her right eye  Patient also reports persistent tearing out of the right eye  Patient does admit to some shortness of breath, however clarifies that it feels more like congestion  Patient also continues to experience a tingling sensation in bilateral hands  She continues to experience posterior headache, currently rated 5/10 pain  Denies chest pain, abdominal pain, nausea, vomiting, weakness, numbness  Of note, patient denies any recent bug bites or tick bites  Also denies noticing any recent rashes  Denies working outside, specifically in tall grasses  Denies history of Lyme disease  ROS:  12 point ROS performed, as stated above, all others negative  Vitals: Blood pressure 127/77, pulse 66, temperature 97 7 °F (36 5 °C), temperature source Oral, resp  rate 16, height 4' 11\" (1 499 m), weight 75 3 kg (166 lb), SpO2 95 %, not currently breastfeeding  ,Body mass index is 33 53 kg/m²  Physical Exam  Vitals and nursing note reviewed  Constitutional:       General: She is not in acute distress  Appearance: Normal appearance  She is not ill-appearing, toxic-appearing or diaphoretic  HENT:      Head: Normocephalic and atraumatic  Eyes:      General: No scleral icterus  Right eye: No discharge  Left eye: No discharge  Extraocular Movements: Extraocular movements intact  Conjunctiva/sclera: Conjunctivae normal       Pupils: Pupils are equal, round, and reactive to light     Musculoskeletal:         General: Normal range of " "motion  Cervical back: Normal range of motion and neck supple  Skin:     General: Skin is warm and dry  Coloration: Skin is not jaundiced or pale  Neurological:      Mental Status: She is alert  Motor: Motor strength is normal    Psychiatric:         Mood and Affect: Mood normal          Behavior: Behavior normal          Thought Content: Thought content normal          Judgment: Judgment normal        Neurologic Exam     Mental Status   Patient is alert, sitting up in chair  Oriented x3  No dysarthria or aphasia noted  Able to follow central and appendicular commands and answers all questions appropriately  Cranial Nerves     CN II   Visual fields full to confrontation  CN III, IV, VI   Pupils are equal, round, and reactive to light  CN V   Facial sensation intact  CN VIII   Hearing: intact    CN XI   CN XI normal    Right upper and lower facial weakness noted  Decreased blinking in the right eye  Weakness noted with the right eyelid closure  Diminished wrinkles noted in right forehead with eyebrow raise     Motor Exam   Muscle bulk: normal  Overall muscle tone: normal  Right arm pronator drift: absent  Left arm pronator drift: absent    Strength   Strength 5/5 throughout  Sensory Exam   Light touch normal      Gait, Coordination, and Reflexes     Tremor   Resting tremor: absent  No ataxia or dysmetria in bilateral upper extremity finger-nose testing    BUE reflexes trace  Bilateral patellar reflexes 1+  Laterally Achilles reflexes 1+    No involuntary movements or rhythmic seizure-like activity noted throughout exam     Lab Results: I have personally reviewed pertinent reports    Recent Results (from the past 24 hour(s))   HS Troponin I 4hr    Collection Time: 05/10/23 11:53 AM   Result Value Ref Range    hs TnI 4hr 4 \"Refer to ACS Flowchart\"- see link ng/L    Delta 4hr hsTnI 0 <20 ng/L   Platelet count    Collection Time: 05/10/23 11:53 AM   Result Value Ref Range    " Platelets 542 487 - 681 Thousands/uL    MPV 10 2 8 9 - 12 7 fL   Echo complete w/ contrast if indicated    Collection Time: 05/10/23  2:38 PM   Result Value Ref Range    AV peak gradient 60 mmHg    LA size 4 3 cm    Aortic valve mean velocity 10 50 m/s    LVPWd 1 00 cm    Left Atrium Area-systolic Apical Two Chamber 19 7 cm2    Left Atrium Area-systolic Four Chamber 73 2 cm2    MV E' Tissue Velocity Septal 7 cm/s    Tricuspid annular plane systolic excursion 0 52 cm    IVSd 9 23 cm    LV DIASTOLIC VOLUME (MOD BIPLANE) 2D 60 mL    LEFT VENTRICLE SYSTOLIC VOLUME (MOD BIPLANE) 2D 18 mL    Left ventricular stroke volume (2D) 42 00 mL    AV Deceleration Time 2,038 ms    A4C EF 64 %    LA length (A2C) 5 80 cm    LVIDd 3 80 cm    IVS 1 2 cm    LVIDS 2 30 cm    FS 39 28 - 44 %    Asc Ao 3 3 cm    Ao root 3 10 cm    RVID d 4 1 cm    LVOT mn grad 3 0 mmHg    AV regurgitation pressure 1/2 time 591 ms    AV mean gradient 5 mmHg    AV LVOT peak gradient 5 mmHg    MV valve area p 1/2 method 4 00 cm2    E wave deceleration time 189 ms    LVOT peak eleazar 1 11 m/s    LVOT peak VTI 23 62 cm    Aortic valve peak velocity 1 53 m/s    Ao VTI 29 86 cm    AV peak gradient 9 mmHg    MV Peak E Eleazar 66 cm/s    MV Peak A Eleazar 0 76 m/s    RAA A4C 18 1 cm2    MV stenosis pressure 1/2 time 55 ms    LVSV, 2D 42 mL    DVI 0 73     LV EF 55    Basic metabolic panel    Collection Time: 05/11/23  4:51 AM   Result Value Ref Range    Sodium 138 135 - 147 mmol/L    Potassium 3 7 3 5 - 5 3 mmol/L    Chloride 104 96 - 108 mmol/L    CO2 27 21 - 32 mmol/L    ANION GAP 7 4 - 13 mmol/L    BUN 15 5 - 25 mg/dL    Creatinine 0 61 0 60 - 1 30 mg/dL    Glucose 92 65 - 140 mg/dL    Calcium 9 0 8 4 - 10 2 mg/dL    eGFR 97 ml/min/1 73sq m   CBC (With Platelets)    Collection Time: 05/11/23  4:51 AM   Result Value Ref Range    WBC 7 26 4 31 - 10 16 Thousand/uL    RBC 4 29 3 81 - 5 12 Million/uL    Hemoglobin 12 2 11 5 - 15 4 g/dL    Hematocrit 38 5 34 8 - 46 1 %    MCV 90 82 - 98 fL    MCH 28 4 26 8 - 34 3 pg    MCHC 31 7 31 4 - 37 4 g/dL    RDW 13 6 11 6 - 15 1 %    Platelets 045 669 - 478 Thousands/uL    MPV 10 4 8 9 - 12 7 fL   ]  Imaging Studies: I have personally reviewed pertinent reports and I have personally reviewed pertinent films in PACS  EKG, Pathology, and Other Studies: I have personally reviewed pertinent reports  VTE Prophylaxis: Enoxaparin (Lovenox)    Counseling / Coordination of Care  Total time spent today 26 minutes  Greater than 50% of total time was spent with the patient and/or family counseling and/or coordination of care  A description of the counseling/coordination of care:  Patient was seen and evaluated  Discussed with attending  Chart reviewed thoroughly including laboratory and imaging studies  Plan of care discussed with patient and primary team   Discussed MRI brain results with the patient at bedside and daughter over the phone, as well as likely etiology of facial weakness being Bell's palsy  Dictation voice to text software has been used in the creation of this document  Please consider this in light of any contextual or grammatical errors

## 2023-05-11 NOTE — UTILIZATION REVIEW
Initial Clinical Review    Admission: Date/Time/Statement:   Admission Orders (From admission, onward)     Ordered        05/10/23 0851  INPATIENT ADMISSION  Once                      Orders Placed This Encounter   Procedures   • INPATIENT ADMISSION     Standing Status:   Standing     Number of Occurrences:   1     Order Specific Question:   Level of Care     Answer:   Med Surg [16]     Order Specific Question:   Estimated length of stay     Answer:   More than 2 Midnights     Order Specific Question:   Certification     Answer:   I certify that inpatient services are medically necessary for this patient for a duration of greater than two midnights  See H&P and MD Progress Notes for additional information about the patient's course of treatment  ED Arrival Information     Expected   -    Arrival   5/10/2023 07:21    Acuity   Emergent            Means of arrival   Walk-In    Escorted by   Self    Service   Hospitalist    Admission type   Emergency            Arrival complaint   CVA symptoms/facial numbness           Chief Complaint   Patient presents with   • Facial Droop     Pt woke up around 0100 with right sided facial drop  Tingling in bilateral hands and right arm and headache in occipital portion of head       Initial Presentation: 59 y o  female with PMH of GERD, depression presented to he ED from work w/   acute onset right facial weakness and right sided paresthesia with concomitant posterior headache that started around 1 am this morning  Pt went to work and her coworkers noticed R sided facial droop and encouraged her to go to the ED  In the ED, she was a stroke alert ,Neurology was consulted and she was loaded with aspirin and Plavix  She did not receive TNK due to being outside of the window  Also reports headache, given migraine cocktail w/ improvement  On exam, aaox3, GCS 15, cranial nerve deficit present, R sided facial droop sparing forehead present     Admitted as Inpatient for stroke like symptoms, headache  Plan: Neurology consulted  Continue Aspirin 81 mg and Plavix  Continue Atorvastatin  F/U MRI  F/U Echo  Lipid panel  A1C  Telemetry  Neurochecks Q shift     05/10 Neurology Consult: Stroke like symptoms, BP elevated 191/84  NIHSS 3  CT head: no acute intracranial abnormalities  CTA head and neck: no high grade stenosis, or aneurysm  Etiology of right facial weakness and right hemisensory deficit unclear at this time  considerations include complex migraine given reported new onset posterior headache  Admit on stroke pathway  MELE and echo pending  Fasting lipid panel, Hemoglobin A1c, TSH  Loaded w/ ASA and Plavix  Start ASA 81 mg daily and Plavix 75 mg daily on 05/11  Start statin  migraine cocktail if headache persists  Allow for permissive hypertension for 24 hours post stroke symptom onset  Goal /110- If MRI brain is negative, recommend normotension  Telemetry  Frequent neuro checks  PT/OT/Speech  Date: 05/11  Day 2: Pt still has facial droop but she was tolerating diet well and speech was okay  MRI brain unremarkable  Most likely Bell's palsy  Neurology recommended prednisone 60 mg and Valtrex for 7 days      ED Triage Vitals   Temperature Pulse Respirations Blood Pressure SpO2   05/10/23 0736 05/10/23 0735 05/10/23 0735 05/10/23 0735 05/10/23 0735   (!) 97 3 °F (36 3 °C) 76 18 (!) 191/84 99 %      Temp Source Heart Rate Source Patient Position - Orthostatic VS BP Location FiO2 (%)   05/10/23 0736 05/10/23 0735 05/10/23 0735 05/10/23 0736 --   Oral Monitor Sitting Right arm       Pain Score       05/10/23 0815       8          Wt Readings from Last 1 Encounters:   05/10/23 75 3 kg (166 lb)     Additional Vital Signs:   Date/Time Temp Pulse Resp BP MAP (mmHg) SpO2 O2 Device Patient Position - Orthostatic VS   05/11/23 0730 97 7 °F (36 5 °C) 66 16 127/77 -- -- -- Lying   05/11/23 0330 98 6 °F (37 °C) -- 18 140/80 -- -- -- Lying   05/11/23 01:30:16 98 5 °F (36 9 °C) 63 -- 141/85 104 95 % -- --   05/10/23 1900 98 °F (36 7 °C) 77 18 149/78 72 97 % None (Room air) Lying   05/10/23 1832 98 3 °F (36 8 °C) 74 16 140/82 -- -- -- Lying   05/10/23 1732 97 9 °F (36 6 °C) 77 16 143/74 -- -- -- Lying   05/10/23 16:32:59 98 °F (36 7 °C) 70 16 134/74 94 -- -- Lying   05/10/23 1515 -- 73 16 125/58 -- 95 % None (Room air) Lying   05/10/23 1414 -- 72 18 128/66 90 98 % None (Room air) Lying   05/10/23 1338 -- 72 -- 128/66 -- -- -- --   05/10/23 1300 -- -- -- 141/76 104 -- -- --   05/10/23 1115 -- 65 18 134/71 92 97 % -- --   05/10/23 1012 -- -- -- -- -- -- None (Room air) --   05/10/23 0930 -- 65 18 132/65 92 97 % -- --   05/10/23 0915 -- 63 18 135/65 95 98 % -- --   05/10/23 0900 -- 67 18 144/71 101 98 % -- --   05/10/23 0830 -- 72 18 166/77 -- 98 % -- --   05/10/23 0815 -- 80 18 170/77 -- 99 % -- --   05/10/23 0800 -- 74 18 154/71 -- 98 % None (Room air) --   05/10/23 0745 -- 78 18 177/86 Abnormal  -- 98 % None (Room air) --   05/10/23 0736 97 3 °F (36 3 °C) Abnormal  81 18 191/84 Abnormal  -- 99 % None (Room air) Sitting         Pertinent Labs/Diagnostic Test Results:   MRI brain w wo contrast   Final Result by Gregoria Joseph MD (05/10 1344)      No mass effect, acute intracranial hemorrhage or evidence of recent infarction  No abnormal parenchymal or leptomeningeal enhancement identified  Workstation performed: [id number]         X-ray chest 1 view portable   ED Interpretation by Halley Vaughan MD (05/10 8820)   NAD      Final Result by Chico Cruz MD (05/10 1412)      No acute cardiopulmonary disease  Workstation performed: [id number]         CTA stroke alert (head/neck)   Final Result by Gregoria Joseph MD (05/10 5166)      No evidence for high-grade stenosis, focal occlusion or vascular aneurysm of the cervical or intracranial vessels  Findings were directly discussed with Lucila Schaffer  at 8:14 a m                             Workstation performed: [id number]         CT stroke alert brain Final Result by Marry Griggs MD (05/10 3408)      No mass effect, acute intracranial hemorrhage or evidence of recent infarction  Findings were directly discussed with Mg Sharma  at 8:14 a m  Workstation performed: [id number]           05/10   EKG result: NSR      ECHO:   Left Ventricle: Left ventricular cavity size is normal  Wall thickness is mildly increased  The left ventricular ejection fraction is 55%  Systolic function is normal  Wall motion is normal  Diastolic function is normal for age  •  Aortic Valve: There is mild regurgitation  •  Tricuspid Valve:  There is mild regurgitation        Results from last 7 days   Lab Units 05/10/23  0742   SARS-COV-2  Negative     Results from last 7 days   Lab Units 05/11/23  0451 05/10/23  1153 05/10/23  0742   WBC Thousand/uL 7 26  --  8 15   HEMOGLOBIN g/dL 12 2  --  12 9   HEMATOCRIT % 38 5  --  39 7   PLATELETS Thousands/uL 288 278 303         Results from last 7 days   Lab Units 05/11/23  0451 05/10/23  0742   SODIUM mmol/L 138 139   POTASSIUM mmol/L 3 7 3 7   CHLORIDE mmol/L 104 101   CO2 mmol/L 27 30   ANION GAP mmol/L 7 8   BUN mg/dL 15 9   CREATININE mg/dL 0 61 0 59*   EGFR ml/min/1 73sq m 97 98   CALCIUM mg/dL 9 0 9 5         Results from last 7 days   Lab Units 05/10/23  0731   POC GLUCOSE mg/dl 91     Results from last 7 days   Lab Units 05/11/23  0451 05/10/23  0742   GLUCOSE RANDOM mg/dL 92 88         Results from last 7 days   Lab Units 05/10/23  0742   HEMOGLOBIN A1C % 5 6   EAG mg/dl 114       Results from last 7 days   Lab Units 05/10/23  1153 05/10/23  0959 05/10/23  0742   HS TNI 0HR ng/L  --   --  4   HS TNI 2HR ng/L  --  4  --    HSTNI D2 ng/L  --  0  --    HS TNI 4HR ng/L 4  --   --    HSTNI D4 ng/L 0  --   --          Results from last 7 days   Lab Units 05/10/23  0742   PROTIME seconds 13 1   INR  0 97   PTT seconds 29         Results from last 7 days   Lab Units 05/10/23  0742   INFLUENZA A PCR  Negative   INFLUENZA B PCR  Negative "  RSV PCR  Negative     ED Treatment:   Medication Administration from 05/10/2023 0720 to 05/10/2023 1540       Date/Time Order Dose Route Action     05/10/2023 0808 EDT iohexol (OMNIPAQUE) 350 MG/ML injection (SINGLE-DOSE) 85 mL 85 mL Intravenous Given     05/10/2023 0840 EDT ketorolac (TORADOL) injection 15 mg 15 mg Intravenous Given     05/10/2023 0840 EDT metoclopramide (REGLAN) injection 10 mg 10 mg Intravenous Given     05/10/2023 0841 EDT diphenhydrAMINE (BENADRYL) injection 25 mg 25 mg Intravenous Given     05/10/2023 0959 EDT aspirin (ECOTRIN LOW STRENGTH) EC tablet 324 mg 324 mg Oral Given     05/10/2023 1000 EDT clopidogrel (PLAVIX) tablet 300 mg 300 mg Oral Given     05/10/2023 1046 EDT Gadobutrol injection (SINGLE-DOSE) SOLN 7 mL 7 mL Intravenous Given     05/10/2023 1154 EDT cholecalciferol (VITAMIN D3) tablet 1,000 Units 1,000 Units Oral Given     05/10/2023 1203 EDT cyanocobalamin (VITAMIN B-12) tablet 1,000 mcg 1,000 mcg Oral Given     05/10/2023 1154 EDT senna (SENOKOT) tablet 8 6 mg 8 6 mg Oral Given     05/10/2023 1154 EDT enoxaparin (LOVENOX) subcutaneous injection 40 mg 40 mg Subcutaneous Given        Past Medical History:   Diagnosis Date   • Abdominal pain     \"burning pain\" came to the ED   • Renal disorder    • Wears dentures     permanent upper denture   • Weight loss     20 lb wt loss in 2-3 weeks d/t stomach issues     Present on Admission:  **None**      Admitting Diagnosis: Hypokalemia [E87 6]  Epigastric pain [R10 13]  Facial droop [R29 810]  Stroke (HCC) [I63 9]  Screen for colon cancer [Z12 11]  Liver cyst [K76 89]  Arm numbness [R20 0]  Screening for breast cancer [Z12 39]  Encounter for immunization [Z23]  Atypical squamous cells of undetermined significance on cytologic smear of cervix (ASC-US) [R87 610]  Pain of upper abdomen [R10 10]  Stroke-like symptoms [R29 90]  Depression, unspecified depression type [F32  A]  Age/Sex: 59 y o  female  Admission Orders:  SCD  PT/OT  Neuro " checks  Nsg dysphagia Screen  Telemetry monitoring    Scheduled Medications:  artificial tear, , Both Eyes, Once  aspirin, 81 mg, Oral, Daily  atorvastatin, 40 mg, Oral, Daily With Dinner  cholecalciferol, 1,000 Units, Oral, Daily  clopidogrel, 75 mg, Oral, Daily  cyanocobalamin, 1,000 mcg, Oral, Daily  enoxaparin, 40 mg, Subcutaneous, Q24H  pantoprazole, 40 mg, Oral, Early Morning  senna, 1 tablet, Oral, Daily      Continuous IV Infusions: none     PRN Meds:  acetaminophen, 650 mg, Oral, Q6H PRN  artificial tear, , Right Eye, HS PRN  ketorolac, 15 mg, Intravenous, Q6H PRN 05/10 x 1        IP CONSULT TO NEUROLOGY    Network Utilization Review Department  ATTENTION: Please call with any questions or concerns to [phone] and carefully listen to the prompts so that you are directed to the right person  All voicemails are confidential   Daljit Small all requests for admission clinical reviews, approved or denied determinations and any other requests to dedicated fax number below belonging to the campus where the patient is receiving treatment   List of dedicated fax numbers for the Facilities:  [address] DENIALS (Administrative/Medical Necessity) [phone]   [address] (Maternity/NICU/Pediatrics) [phone]   915 Patrizia Fuentes [phone]   Deidre Urrutia 77 [phone]   [address] [address] Lula Smith [phone]   1554 [facility] 134 815 [facility] [phone]

## 2023-05-11 NOTE — PLAN OF CARE
Problem: PHYSICAL THERAPY ADULT  Goal: Performs mobility at highest level of function for planned discharge setting  See evaluation for individualized goals  Description: Treatment/Interventions: Elevations, LE strengthening/ROM, Therapeutic exercise, Endurance training, Patient/family training, Equipment eval/education, Gait training          See flowsheet documentation for full assessment, interventions and recommendations  Note:    Problem List: Decreased strength, Decreased endurance, Impaired balance, Decreased mobility, Decreased safety awareness, Impaired sensation  Assessment: Pt was in her bathroom preparing for work, she noticed that water was dripping from the side her mother  Also was experiencing some numbness and tingling in right arm and hand  Reports that the right side of her face felt weak  Dx: stroke-like symptoms  order placed for PT eval and tx, w/ activity order of up and out of bed as tolerated  pt presents w/ comorbidities of depression and osteoporosis and personal factors of living in 2 story house and preferred language not [place] (language barrier)  pt presents w/ weakness, decreased endurance, impaired balance, gait deviations, altered sensation, decreased safety awareness and fall risk  these impairments are evident in findings from physical examination (weakness and altered sensation), mobility assessment (need for standby assist w/ gait and stair use when usually mobilizing independently, tolerance to only 180 feet of ambulation and need for cueing for mobility technique), and Comfortable Gait Speed: 0 53 m/s (less than 1 0 m/s indicates need for intervention to address falls risk)  pt needed input for mobility technique  pt is at risk for falls due to physical and safety awareness deficits   pt's clinical presentation is unstable/unpredictable (evident in need for standby assist w/ ambulation and stair use when usually mobilizing independently, tolerance to only 180 feet of ambulation and need for input for mobility technique/safety)  pt needs inpatient PT tx to improve mobility deficits and progress mobility training as appropriate  discharge recommendation is for outpatient PT to improve activity tolerance and maximize level of functional independence  PT Discharge Recommendation: Home with outpatient rehabilitation    See flowsheet documentation for full assessment

## 2023-05-11 NOTE — CASE MANAGEMENT
Case Management Assessment & Discharge Planning Note    Patient name Corinne Kling  Location S /S -61 MRN [id number]  : 1960 Date 2023       Current Admission Date: 5/10/2023  Current Admission Diagnosis:Stroke-like symptoms   Patient Active Problem List    Diagnosis Date Noted   • Stroke-like symptoms 05/10/2023   • Obesity 05/10/2023   • Headache 05/10/2023   • Abnormal Pap smear of cervix 2020   • Pain of upper abdomen 2020   • Liver cyst 2020   • Screen for colon cancer 2020   • Epigastric pain 2020   • Screening for breast cancer 2020   • Encounter for immunization 2020   • Hypokalemia 2020   • Depression 2020      LOS (days): 1  Geometric Mean LOS (GMLOS) (days):   Days to GMLOS:     OBJECTIVE:    Risk of Unplanned Readmission Score: 7 1   Current admission status: Inpatient  Referral Reason: Stroke    Preferred Pharmacy:   [address] [address] [address]  Phone: [phone] Fax: [phone]    Primary Care Provider: Lenin Acuña PA-C    Primary Insurance: BLUE CROSS  Secondary Insurance:     ASSESSMENT:  Readmission Root Cause  30 Day Readmission: No    Patient Information  Admitted from[de-identified] Home  Mental Status: Alert  During Assessment patient was accompanied by: Not accompanied during assessment  Assessment information provided by[de-identified] Patient  Primary Caregiver: Self  Support Systems: Spouse/significant other, Family members, [facility] of Residence: [address] [place] [address] do you live in?: [address] entry access options   Select all that apply : No steps to enter home  Type of Current Residence: 2 story home  Upon entering residence, is there a bedroom on the main floor (no further steps)?: Yes  Upon entering residence, is there a bathroom on the main floor (no further steps)?: Yes  In the last 12 months, was there a time when you were not able to pay the mortgage or rent on time?: No  In the last 12 months, how many places have you lived?: 1  In the last 12 months, was there a time when you did not have a steady place to sleep or slept in a shelter (including now)?: No  Homeless/housing insecurity resource given?: No  Living Arrangements: Lives w/ Spouse/significant other  Is patient a ?: No    Activities of Daily Living Prior to Admission  Functional Status: Independent  Completes ADLs independently?: Yes  Ambulates independently?: Yes  Does patient use assisted devices?: No  Does patient currently own DME?: No  Does patient have a history of Outpatient Therapy (PT/OT)?: No  Does the patient have a history of Short-Term Rehab?: No  Does patient have a history of HHC?: No  [facility] No    Patient Information Continued  Income Source: Employed  Does patient have prescription coverage?: Yes  Within the past 12 months, you worried that your food would run out before you got the money to buy more : Never true  Within the past 12 months, the food you bought just didn't last and you didn't have money to get more : Never true  Food insecurity resource given?: No  Does patient receive dialysis treatments?: No  Does patient have a history of substance abuse?: No  Does patient have a history of Mental Health Diagnosis?: No    PHQ 2/9 Screening   Reviewed PHQ 2/9 Depression Screening Score?: No    Means of Transportation  Means of Transport to Appts[de-identified] Drives Self  In the past 12 months, has lack of transportation kept you from medical appointments or from getting medications?: No  In the past 12 months, has lack of transportation kept you from meetings, work, or from getting things needed for daily living?: No  Was application for public transport provided?: No    DISCHARGE DETAILS:    Discharge planning discussed with[de-identified] Patient  Freedom of Choice: Yes     CM contacted family/caregiver?: No- see comments (VM left for daughter)  Were Treatment Team discharge recommendations reviewed with patient/caregiver?: Yes  Did patient/caregiver verbalize understanding of patient care needs?: Yes  Were patient/caregiver advised of the risks associated with not following Treatment Team discharge recommendations?: Yes    [address]         Is the patient interested in [facility] at discharge?: No    DME Referral Provided  Referral made for DME?: No    Other Referral/Resources/Interventions Provided:  Interventions: None Indicated    Would you like to participate in our [facility] service program?  : No - Declined    Treatment Team Recommendation: Home  Discharge Destination Plan[de-identified] Home  Transport at Discharge : Family

## 2023-05-12 VITALS
HEIGHT: 59 IN | OXYGEN SATURATION: 98 % | RESPIRATION RATE: 18 BRPM | WEIGHT: 166 LBS | SYSTOLIC BLOOD PRESSURE: 167 MMHG | TEMPERATURE: 99.2 F | HEART RATE: 85 BPM | DIASTOLIC BLOOD PRESSURE: 83 MMHG | BODY MASS INDEX: 33.47 KG/M2

## 2023-05-12 NOTE — UTILIZATION REVIEW
NOTIFICATION OF ADMISSION DISCHARGE   This is a Notification of Discharge from [address]  Please be advised that this patient has been discharge from our facility  Below you will find the admission and discharge date and time including the patient’s disposition  UTILIZATION REVIEW CONTACT:  Amira Terry MA  Utilization   Network Utilization Review Department  Phone: [phone] x carefully listen to the prompts  All voicemails are confidential   Email: [email or web address]     ADMISSION INFORMATION  PRESENTATION DATE: 5/10/2023  7:25 AM  OBERVATION ADMISSION DATE:   INPATIENT ADMISSION DATE: 5/10/23  8:51 AM   DISCHARGE DATE: 5/11/2023  3:55 PM   DISPOSITION:Home/Self Care    IMPORTANT INFORMATION:  Send all requests for admission clinical reviews, approved or denied determinations and any other requests to dedicated fax number below belonging to the campus where the patient is receiving treatment   List of dedicated fax numbers:  [address] DENIALS (Administrative/Medical Necessity) [phone]   [address] (Maternity/NICU/Pediatrics) [phone]   [facility] [phone]   [place] [address] [phone]   [address] [phone]   220 [facility] [phone] 90 [facility] [phone]   [address] [place], [place] [address] [phone]   [facility]  [phone]   4053 [facility] [phone]   [address] [facility] [address] [facility] [phone]

## 2023-05-15 ENCOUNTER — TRANSITIONAL CARE MANAGEMENT (OUTPATIENT)
Dept: FAMILY MEDICINE CLINIC | Facility: CLINIC | Age: 63
End: 2023-05-15

## 2023-05-16 NOTE — ED ATTENDING ATTESTATION
5/10/2023  I, Jennifer Payan MD, saw and evaluated the patient  I have discussed the patient with the resident/non-physician practitioner and agree with the resident's/non-physician practitioner's findings, Plan of Care, and MDM as documented in the resident's/non-physician practitioner's note, except where noted  All available labs and Radiology studies were reviewed  I was present for key portions of any procedure(s) performed by the resident/non-physician practitioner and I was immediately available to provide assistance  At this point I agree with the current assessment done in the Emergency Department  I have conducted an independent evaluation of this patient a history and physical is as follows:    58 yo female to GERD p/w right facial droop noted at 1am, and RUE paraesthesia  Denies double vision/loss of vision/changes in speech  Exam consistent with right droop with forehead involvement, however subjective decreased sensation noted to RUE  Stroke alert called as this not consistent with Bell's  Discussed with neurology in real time who request admission via stroke pathway  Imaging negative  EKG and labs reassuring        ED Course  ED Course as of 05/16/23 1712   Wed May 10, 2023   1407 X-ray chest 1 view portable  IMPRESSION:     No acute cardiopulmonary disease                  Workstation performed: [id number]            Critical Care Time  Procedures

## 2023-05-24 ENCOUNTER — TELEPHONE (OUTPATIENT)
Dept: NEUROLOGY | Facility: CLINIC | Age: 63
End: 2023-05-24

## 2023-05-24 NOTE — TELEPHONE ENCOUNTER
1st attempt,     Called patient at the other phone number as her home number is not in service, [facility]    HFU/nigel/ stroke like symptoms    Discharged home 05/11/2023  John Carlos will need follow up in 6-8 weeks with general attending or advance practitioner  She will not require outpatient neurological testing

## 2023-06-02 ENCOUNTER — TELEPHONE (OUTPATIENT)
Dept: NEUROLOGY | Facility: CLINIC | Age: 63
End: 2023-06-02

## 2023-06-02 NOTE — TELEPHONE ENCOUNTER
Patient has been scheduled for a hospital follow up with Dr Rowena Lakhani on 7/18/23 at 3pm in [facility] Session  Guero Avendano will need follow up in 6-8 weeks with general attending or advance practitioner  She will not require outpatient neurological testing      **Used interpretor service, Elena Puga, [id number]**

## 2023-06-02 NOTE — TELEPHONE ENCOUNTER
2nd attempt,     Called patient no answer, LMOM      Letter sent     Thank you,     Christina Morales

## 2023-07-03 ENCOUNTER — TELEPHONE (OUTPATIENT)
Dept: NEUROLOGY | Facility: CLINIC | Age: 63
End: 2023-07-03

## 2023-07-14 ENCOUNTER — TELEPHONE (OUTPATIENT)
Dept: NEUROLOGY | Facility: CLINIC | Age: 63
End: 2023-07-14

## 2023-07-14 NOTE — TELEPHONE ENCOUNTER
Called patient LVM to call back to confirm upcoming appointment with provider. I will have [place] interrupter to call back to confirm.

## 2023-07-18 ENCOUNTER — OFFICE VISIT (OUTPATIENT)
Dept: NEUROLOGY | Facility: CLINIC | Age: 63
End: 2023-07-18
Payer: COMMERCIAL

## 2023-07-18 VITALS
HEIGHT: 59 IN | HEART RATE: 74 BPM | DIASTOLIC BLOOD PRESSURE: 70 MMHG | SYSTOLIC BLOOD PRESSURE: 124 MMHG | WEIGHT: 167.4 LBS | BODY MASS INDEX: 33.75 KG/M2

## 2023-07-18 DIAGNOSIS — G51.0 BELL PALSY: Primary | ICD-10-CM

## 2023-07-18 PROCEDURE — 99214 OFFICE O/P EST MOD 30 MIN: CPT | Performed by: STUDENT IN AN ORGANIZED HEALTH CARE EDUCATION/TRAINING PROGRAM

## 2023-07-18 NOTE — ASSESSMENT & PLAN NOTE
Joseph's Palsy    59 y.o. female with GERD, depression, osteoporosis who presents to [facility] ED on 5/10/2023 as a stroke alert with right facial weakness, right upper and lower extremity paresthesia, and posterior headache presenting for HFU. She noted that R hemisensory had been ongoing for a few days prior coming into the hospital. Then she developed weakness in her face and that prompted her to go the hospital. [facility] and CTA head and neck unremarkable. Patient was not an IV TNK candidate as patient was out of the time window. Patient was loaded with aspirin and Plavix and then d/c given negative imaging. Was given migraine cocktail Benadryl 25 mg x1, Toradol 15 mg x1, Reglan 10 mg x1. Patient was noted to have weakness in her R side of forehead, closing eyes, and could not close eyes completley consistent w/ Bell's palsy. Patient was discharged on prednisone 60mg x1 week and valcyclovir. 1000mg TID for 1 week. The symptoms she had improved by the time of discharge but completely went away after a few days post discharge. She was continued on artifical tears. She stated the whole duration lasted for 7 days. 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 Ata Newsome in residency clinic  No further recommendations at this time since symptoms completely resolved   F/u PRN

## 2023-07-18 NOTE — PROGRESS NOTES
Patient ID: Anika Colmenares is a 59 y.o. female. Assessment/Plan:    Bell palsy  Bell's Palsy    59 y.o. female with GERD, depression, osteoporosis who presents to [facility] ED on 5/10/2023 as a stroke alert with right facial weakness, right upper and lower extremity paresthesia, and posterior headache presenting for HFU. She noted that R hemisensory had been ongoing for a few days prior coming into the hospital. Then she developed weakness in her face and that prompted her to go the hospital. [facility] and CTA head and neck unremarkable. Patient was not an IV TNK candidate as patient was out of the time window. Patient was loaded with aspirin and Plavix and then d/c given negative imaging. Was given migraine cocktail Benadryl 25 mg x1, Toradol 15 mg x1, Reglan 10 mg x1. Patient was noted to have weakness in her R side of forehead, closing eyes, and could not close eyes completley consistent w/ Bell's palsy. Patient was discharged on prednisone 60mg x1 week and valcyclovir. 1000mg TID for 1 week. The symptoms she had improved by the time of discharge but completely went away after a few days post discharge. She was continued on artifical tears. She stated the whole duration lasted for 7 days. 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 Sarahi Yeh in residency clinic  No further recommendations at this time since symptoms completely resolved   F/u PRN          Problem List Items Addressed This Visit    None Subjective:    HPI      enriqueptkaushal Gibbons       59 y.o. female with GERD, depression, osteoporosis who presents to [facility] ED on 5/10/2023 as a stroke alert with right facial weakness, right upper and lower extremity paresthesia, and posterior headache presenting for HFU. BP on presentation: Elevated 191/84. NIHSS of 3 for right facial weakness and right hemisensory deficit. She noted that R hemisensory had been ongoing for a few days prior coming into the hospital. Then she developed weakness in her face and that prompted her to go the hospital. [address] and CTA head and neck unremarkable. Patient was not an IV TNK candidate as patient was out of the time window. Patient was loaded with aspirin and Plavix and then d/c given negative imaging. Was given migraine cocktail Benadryl 25 mg x1, Toradol 15 mg x1, Reglan 10 mg x1. Patient was discharged on prednisone 60mg x1 week and valcyclovir 1000mg TID for 1 week. The symptoms she had improved by the time of discharge but completely went away after a few days post discharge. She was continued on artifical tears. She stated the whole duration lasted for 7 days    MRI was ultimately negative. Worsening of right facial weakness, now with new decreased blinking of right eye, weakness with right eyelid closure, and diminished forehead wrinkles on the right, concerning for Bell's palsy. There more concern for a R cranial nerve 7 palsy possibly idiopathic vs lyme. Started on steroids and valacyclovir and given that patient will likely 1 month. Unsure if she had been bitten by a tick   Denied any hearing loss. SHe denies any headaches, syncope, paresthesias, diplopia, visual changes, tinnitus, sudden onset weakness, garbled speech, dysarthria/slurring of words,  Loss of bowel or bladder      Lyme was negative.    W/U:  • CT head: Unremarkable for acute intracranial abnormalities  • CTA head and neck: Unremarkable for large vessel occlusion/critical stenosis  • MRI brain w wo: Unremarkable for acute infarct, hemorrhage, or abnormal enhancement   • Echo: EF 55%, wall motion normal; bilateral atrium size WNL  • Hemoglobin A1c: 5.6  • Lipid panel: Total cholesterol 187, Triglycerides elevated 282, HDL 51, LDL 80  • 05/11/2023 Lyme negative      The following portions of the patient's history were reviewed and updated as appropriate:   She  has a past medical history of Abdominal pain, Renal disorder, Wears dentures, and Weight loss. She   Patient Active Problem List    Diagnosis Date Noted   • Bell palsy 07/18/2023   • Stroke-like symptoms 05/10/2023   • Obesity 05/10/2023   • Headache 05/10/2023   • Abnormal Pap smear of cervix 04/16/2020   • Pain of upper abdomen 03/11/2020   • Liver cyst 03/11/2020   • Screen for colon cancer 02/26/2020   • Epigastric pain 02/26/2020   • Screening for breast cancer 02/26/2020   • Encounter for immunization 02/26/2020   • Hypokalemia 02/26/2020   • Depression 02/26/2020     She  has a past surgical history that includes Cholecystectomy and Tubal ligation. Her family history includes Arthritis in her mother; Coronary artery disease in her father; Glaucoma in her father; Heart attack in her father; Heart disease in her father. She  reports that she has never smoked. She has never used smokeless tobacco. She reports that she does not currently use alcohol. She reports that she does not use drugs. Current Outpatient Medications   Medication Sig Dispense Refill   • cholecalciferol (VITAMIN D3) 1,000 units tablet Take 1,000 Units by mouth daily     • cyanocobalamin (VITAMIN B-12) 1000 MCG tablet Take 1,000 mcg by mouth daily     • Na Sulfate-K Sulfate-Mg Sulf (Suprep Bowel Prep Kit) 17.5-3.13-1.6 GM/177ML SOLN Take 2 Bottles by mouth see administration instructions Please follow the instructions from the office (Patient not taking: Reported on 7/18/2023) 2 Bottle 0     No current facility-administered medications for this visit. Current Outpatient Medications on File Prior to Visit   Medication Sig   • cholecalciferol (VITAMIN D3) 1,000 units tablet Take 1,000 Units by mouth daily   • cyanocobalamin (VITAMIN B-12) 1000 MCG tablet Take 1,000 mcg by mouth daily   • Na Sulfate-K Sulfate-Mg Sulf (Suprep Bowel Prep Kit) 17.5-3.13-1.6 GM/177ML SOLN Take 2 Bottles by mouth see administration instructions Please follow the instructions from the office (Patient not taking: Reported on 7/18/2023)   • [DISCONTINUED] Artificial Tears ophthalmic solution Administer 1 drop to the right eye 3 (three) times a day as needed (eye dryness) (Patient not taking: Reported on 7/18/2023)   • [DISCONTINUED] valACYclovir (VALTREX) 1,000 mg tablet Take 1 tablet (1,000 mg total) by mouth 3 (three) times a day for 7 days     No current facility-administered medications on file prior to visit. She has No Known Allergies. .         Objective:    Blood pressure 124/70, pulse 74, height 4' 11" (1.499 m), weight 75.9 kg (167 lb 6.4 oz), not currently breastfeeding. Physical Exam  Eyes:      General: Lids are normal.      Extraocular Movements: Extraocular movements intact. Pupils: Pupils are equal, round, and reactive to light. Neurological:      Motor: Motor strength is normal.     Deep Tendon Reflexes:      Reflex Scores:       Tricep reflexes are 2+ on the right side and 2+ on the left side. Bicep reflexes are 2+ on the right side and 2+ on the left side. Brachioradialis reflexes are 2+ on the right side and 2+ on the left side. Patellar reflexes are 2+ on the right side and 2+ on the left side. Achilles reflexes are 2+ on the right side and 2+ on the left side. GENERAL EXAM:    CONSTITUTIONAL: Well developed, well nourished, well groomed. No dysmorphic features. Eyes:  PERRLA, EOM normal      Neck:  Normal ROM, neck supple. HEENT:  Normocephalic atraumatic. No meningismus.     Oropharynx is clear and moist. No oral mucosal lesions. Chest:  Respirations regular and unlabored. Cardiovascular:  Distal extremities warm without palpable edema or tenderness, no observed significant swelling. Musculoskeletal:  Full range of motion. Skin:  warm and dry   Psychiatric:  Normal behavior and appropriate affect          Neurological Exam  Mental Status  Awake, alert and oriented to person, place and time. Cranial Nerves  CN II: Visual acuity is normal. Visual fields full to confrontation. CN III, IV, VI: Extraocular movements intact bilaterally. Normal lids and orbits bilaterally. Pupils equal round and reactive to light bilaterally. CN V: Facial sensation is normal.  CN VII: Full and symmetric facial movement. CN VIII: Hearing is normal.  CN IX, X: Palate elevates symmetrically. Normal gag reflex. CN XI: Shoulder shrug strength is normal.  CN XII: Tongue midline without atrophy or fasciculations. Motor   Strength is 5/5 throughout all four extremities. Sensory  Sensation is intact to light touch, pinprick, vibration and proprioception in all four extremities. Reflexes                                            Right                      Left  Brachioradialis                    2+                         2+  Biceps                                 2+                         2+  Triceps                                2+                         2+  Patellar                                2+                         2+  Achilles                                2+                         2+  Right Plantar: downgoing  Left Plantar: downgoing    Coordination  Right: Finger-to-nose normal. Rapid alternating movement normal. Heel-to-shin normal.Left: Finger-to-nose normal. Rapid alternating movement normal. Heel-to-shin normal.    Gait  Casual gait is normal including stance, stride, and arm swing. ROS:    Review of Systems   Constitutional: Negative for chills and fever.    HENT: Negative for ear pain and sore throat. Eyes: Negative for pain and visual disturbance. Respiratory: Negative for cough and shortness of breath. Cardiovascular: Negative for chest pain and palpitations. Gastrointestinal: Negative for abdominal pain and vomiting. Genitourinary: Negative for dysuria and hematuria. Musculoskeletal: Negative for arthralgias and back pain. Skin: Negative for color change and rash. Neurological: Negative for seizures, syncope and numbness. All other systems reviewed and are negative.

## 2023-11-11 ENCOUNTER — APPOINTMENT (EMERGENCY)
Dept: CT IMAGING | Facility: HOSPITAL | Age: 63
DRG: 330 | End: 2023-11-11
Payer: COMMERCIAL

## 2023-11-11 ENCOUNTER — APPOINTMENT (INPATIENT)
Dept: CT IMAGING | Facility: HOSPITAL | Age: 63
DRG: 330 | End: 2023-11-11
Payer: COMMERCIAL

## 2023-11-11 ENCOUNTER — HOSPITAL ENCOUNTER (INPATIENT)
Facility: HOSPITAL | Age: 63
LOS: 5 days | Discharge: HOME | DRG: 330 | End: 2023-11-16
Attending: EMERGENCY MEDICINE | Admitting: SURGERY
Payer: COMMERCIAL

## 2023-11-11 DIAGNOSIS — R19.00 ABDOMINAL MASS: ICD-10-CM

## 2023-11-11 DIAGNOSIS — K56.1 INTUSSUSCEPTION OF COLON (HCC): Primary | ICD-10-CM

## 2023-11-11 LAB
ABO GROUP BLD: NORMAL
ALBUMIN SERPL BCP-MCNC: 3.9 G/DL (ref 3.5–5)
ALP SERPL-CCNC: 67 U/L (ref 34–104)
ALT SERPL W P-5'-P-CCNC: 14 U/L (ref 7–52)
ANION GAP SERPL CALCULATED.3IONS-SCNC: 6 MMOL/L
AST SERPL W P-5'-P-CCNC: 16 U/L (ref 13–39)
BASOPHILS # BLD AUTO: 0.03 THOUSANDS/ÂΜL (ref 0–0.1)
BASOPHILS NFR BLD AUTO: 0 % (ref 0–1)
BILIRUB SERPL-MCNC: 0.33 MG/DL (ref 0.2–1)
BLD GP AB SCN SERPL QL: NEGATIVE
BUN SERPL-MCNC: 12 MG/DL (ref 5–25)
CALCIUM SERPL-MCNC: 8.8 MG/DL (ref 8.4–10.2)
CHLORIDE SERPL-SCNC: 107 MMOL/L (ref 96–108)
CO2 SERPL-SCNC: 26 MMOL/L (ref 21–32)
CREAT SERPL-MCNC: 0.73 MG/DL (ref 0.6–1.3)
EOSINOPHIL # BLD AUTO: 0.27 THOUSAND/ÂΜL (ref 0–0.61)
EOSINOPHIL NFR BLD AUTO: 3 % (ref 0–6)
ERYTHROCYTE [DISTWIDTH] IN BLOOD BY AUTOMATED COUNT: 13.7 % (ref 11.6–15.1)
GFR SERPL CREATININE-BSD FRML MDRD: 88 ML/MIN/1.73SQ M
GLUCOSE SERPL-MCNC: 93 MG/DL (ref 65–140)
HCT VFR BLD AUTO: 35.6 % (ref 34.8–46.1)
HGB BLD-MCNC: 11.2 G/DL (ref 11.5–15.4)
IMM GRANULOCYTES # BLD AUTO: 0.04 THOUSAND/UL (ref 0–0.2)
IMM GRANULOCYTES NFR BLD AUTO: 0 % (ref 0–2)
LIPASE SERPL-CCNC: 40 U/L (ref 11–82)
LYMPHOCYTES # BLD AUTO: 3.4 THOUSANDS/ÂΜL (ref 0.6–4.47)
LYMPHOCYTES NFR BLD AUTO: 31 % (ref 14–44)
MCH RBC QN AUTO: 28.5 PG (ref 26.8–34.3)
MCHC RBC AUTO-ENTMCNC: 31.5 G/DL (ref 31.4–37.4)
MCV RBC AUTO: 91 FL (ref 82–98)
MONOCYTES # BLD AUTO: 0.79 THOUSAND/ÂΜL (ref 0.17–1.22)
MONOCYTES NFR BLD AUTO: 7 % (ref 4–12)
NEUTROPHILS # BLD AUTO: 6.48 THOUSANDS/ÂΜL (ref 1.85–7.62)
NEUTS SEG NFR BLD AUTO: 59 % (ref 43–75)
NRBC BLD AUTO-RTO: 0 /100 WBCS
PLATELET # BLD AUTO: 283 THOUSANDS/UL (ref 149–390)
PLATELET # BLD AUTO: 320 THOUSANDS/UL (ref 149–390)
PMV BLD AUTO: 10.3 FL (ref 8.9–12.7)
PMV BLD AUTO: 9.9 FL (ref 8.9–12.7)
POTASSIUM SERPL-SCNC: 3.7 MMOL/L (ref 3.5–5.3)
PROT SERPL-MCNC: 7.4 G/DL (ref 6.4–8.4)
RBC # BLD AUTO: 3.93 MILLION/UL (ref 3.81–5.12)
RH BLD: POSITIVE
SODIUM SERPL-SCNC: 139 MMOL/L (ref 135–147)
SPECIMEN EXPIRATION DATE: NORMAL
WBC # BLD AUTO: 11.01 THOUSAND/UL (ref 4.31–10.16)

## 2023-11-11 PROCEDURE — 86901 BLOOD TYPING SEROLOGIC RH(D): CPT

## 2023-11-11 PROCEDURE — 82378 CARCINOEMBRYONIC ANTIGEN: CPT

## 2023-11-11 PROCEDURE — 83690 ASSAY OF LIPASE: CPT

## 2023-11-11 PROCEDURE — 99223 1ST HOSP IP/OBS HIGH 75: CPT | Performed by: PHYSICIAN ASSISTANT

## 2023-11-11 PROCEDURE — 96374 THER/PROPH/DIAG INJ IV PUSH: CPT

## 2023-11-11 PROCEDURE — 99285 EMERGENCY DEPT VISIT HI MDM: CPT | Performed by: EMERGENCY MEDICINE

## 2023-11-11 PROCEDURE — 86900 BLOOD TYPING SEROLOGIC ABO: CPT

## 2023-11-11 PROCEDURE — 74177 CT ABD & PELVIS W/CONTRAST: CPT

## 2023-11-11 PROCEDURE — 96376 TX/PRO/DX INJ SAME DRUG ADON: CPT

## 2023-11-11 PROCEDURE — 96375 TX/PRO/DX INJ NEW DRUG ADDON: CPT

## 2023-11-11 PROCEDURE — 36415 COLL VENOUS BLD VENIPUNCTURE: CPT

## 2023-11-11 PROCEDURE — 99285 EMERGENCY DEPT VISIT HI MDM: CPT

## 2023-11-11 PROCEDURE — G1004 CDSM NDSC: HCPCS

## 2023-11-11 PROCEDURE — 86850 RBC ANTIBODY SCREEN: CPT

## 2023-11-11 PROCEDURE — 80053 COMPREHEN METABOLIC PANEL: CPT

## 2023-11-11 PROCEDURE — 85049 AUTOMATED PLATELET COUNT: CPT

## 2023-11-11 PROCEDURE — 93005 ELECTROCARDIOGRAM TRACING: CPT

## 2023-11-11 PROCEDURE — 85025 COMPLETE CBC W/AUTO DIFF WBC: CPT

## 2023-11-11 RX ORDER — METHOCARBAMOL 500 MG/1
500 TABLET, FILM COATED ORAL EVERY 6 HOURS PRN
Status: DISCONTINUED | OUTPATIENT
Start: 2023-11-11 | End: 2023-11-14

## 2023-11-11 RX ORDER — HYDROMORPHONE HCL/PF 1 MG/ML
0.5 SYRINGE (ML) INJECTION ONCE
Status: COMPLETED | OUTPATIENT
Start: 2023-11-11 | End: 2023-11-11

## 2023-11-11 RX ORDER — MAGNESIUM HYDROXIDE/ALUMINUM HYDROXICE/SIMETHICONE 120; 1200; 1200 MG/30ML; MG/30ML; MG/30ML
30 SUSPENSION ORAL ONCE
Status: COMPLETED | OUTPATIENT
Start: 2023-11-11 | End: 2023-11-11

## 2023-11-11 RX ORDER — OXYCODONE HYDROCHLORIDE 5 MG/1
5 TABLET ORAL EVERY 4 HOURS PRN
Status: DISCONTINUED | OUTPATIENT
Start: 2023-11-11 | End: 2023-11-12

## 2023-11-11 RX ORDER — HEPARIN SODIUM 5000 [USP'U]/ML
5000 INJECTION, SOLUTION INTRAVENOUS; SUBCUTANEOUS EVERY 8 HOURS SCHEDULED
Status: DISCONTINUED | OUTPATIENT
Start: 2023-11-11 | End: 2023-11-12

## 2023-11-11 RX ORDER — METRONIDAZOLE 500 MG/100ML
500 INJECTION, SOLUTION INTRAVENOUS
Status: DISCONTINUED | OUTPATIENT
Start: 2023-11-12 | End: 2023-11-12

## 2023-11-11 RX ORDER — HYDROMORPHONE HCL/PF 1 MG/ML
0.5 SYRINGE (ML) INJECTION EVERY 4 HOURS PRN
Status: DISCONTINUED | OUTPATIENT
Start: 2023-11-11 | End: 2023-11-12

## 2023-11-11 RX ORDER — CEFAZOLIN SODIUM 2 G/50ML
2000 SOLUTION INTRAVENOUS
Status: COMPLETED | OUTPATIENT
Start: 2023-11-12 | End: 2023-11-12

## 2023-11-11 RX ORDER — SODIUM CHLORIDE, SODIUM LACTATE, POTASSIUM CHLORIDE, CALCIUM CHLORIDE 600; 310; 30; 20 MG/100ML; MG/100ML; MG/100ML; MG/100ML
125 INJECTION, SOLUTION INTRAVENOUS CONTINUOUS
Status: DISCONTINUED | OUTPATIENT
Start: 2023-11-11 | End: 2023-11-13

## 2023-11-11 RX ORDER — ACETAMINOPHEN 325 MG/1
650 TABLET ORAL EVERY 6 HOURS PRN
Status: DISCONTINUED | OUTPATIENT
Start: 2023-11-11 | End: 2023-11-12

## 2023-11-11 RX ORDER — FAMOTIDINE 10 MG/ML
20 INJECTION, SOLUTION INTRAVENOUS ONCE
Status: COMPLETED | OUTPATIENT
Start: 2023-11-11 | End: 2023-11-11

## 2023-11-11 RX ORDER — METRONIDAZOLE 500 MG/100ML
500 INJECTION, SOLUTION INTRAVENOUS EVERY 8 HOURS
Status: DISCONTINUED | OUTPATIENT
Start: 2023-11-11 | End: 2023-11-12

## 2023-11-11 RX ORDER — DIPHENOXYLATE HYDROCHLORIDE AND ATROPINE SULFATE 2.5; .025 MG/1; MG/1
1 TABLET ORAL DAILY
COMMUNITY

## 2023-11-11 RX ADMIN — SODIUM CHLORIDE, SODIUM LACTATE, POTASSIUM CHLORIDE, AND CALCIUM CHLORIDE 125 ML/HR: .6; .31; .03; .02 INJECTION, SOLUTION INTRAVENOUS at 22:39

## 2023-11-11 RX ADMIN — ALUMINUM HYDROXIDE, MAGNESIUM HYDROXIDE, AND DIMETHICONE 30 ML: 200; 20; 200 SUSPENSION ORAL at 17:05

## 2023-11-11 RX ADMIN — HYDROMORPHONE HYDROCHLORIDE 0.5 MG: 1 INJECTION, SOLUTION INTRAMUSCULAR; INTRAVENOUS; SUBCUTANEOUS at 20:23

## 2023-11-11 RX ADMIN — CEFTRIAXONE SODIUM 1000 MG: 10 INJECTION, POWDER, FOR SOLUTION INTRAVENOUS at 22:39

## 2023-11-11 RX ADMIN — HYDROMORPHONE HYDROCHLORIDE 0.5 MG: 1 INJECTION, SOLUTION INTRAMUSCULAR; INTRAVENOUS; SUBCUTANEOUS at 17:05

## 2023-11-11 RX ADMIN — METRONIDAZOLE 500 MG: 500 INJECTION, SOLUTION INTRAVENOUS at 23:19

## 2023-11-11 RX ADMIN — FAMOTIDINE 20 MG: 10 INJECTION INTRAVENOUS at 17:05

## 2023-11-11 RX ADMIN — IOHEXOL 100 ML: 350 INJECTION, SOLUTION INTRAVENOUS at 18:55

## 2023-11-11 NOTE — ED ATTENDING ATTESTATION
11/11/2023  I, Socorro Landon MD, saw and evaluated the patient. I have discussed the patient with the resident/non-physician practitioner and agree with the resident's/non-physician practitioner's findings, Plan of Care, and MDM as documented in the resident's/non-physician practitioner's note, except where noted. All available labs and Radiology studies were reviewed. I was present for key portions of any procedure(s) performed by the resident/non-physician practitioner and I was immediately available to provide assistance. At this point I agree with the current assessment done in the Emergency Department. I have conducted an independent evaluation of this patient a history and physical is as follows:    S:  Chief Complaint   Patient presents with    Abdominal Pain     Patient presents for upper abdominal pain causing her to have shallow breathing. Patient states she did have an episode of black stools and states there was blood when she wiped. This has been going on for a week. Donna Davenport is a 59 y.o. female who presents with the chief complaint of epigastric abdominal pain. This has been present for the past 2 weeks but it has been significantly worse since yesterday. She has associated diarrhea with blood (minimal bright red). She states this morning that her stool was dark. No nausea or vomiting, fevers, chills. No chest pain or sob. She does report the pain prevents her from taking a deep breath. O:  ED Triage Vitals   Temperature Pulse Respirations Blood Pressure SpO2   11/11/23 1634 11/11/23 1636 11/11/23 1636 11/11/23 1636 11/11/23 1636   98.4 °F (36.9 °C) 74 20 149/73 97 %      Temp Source Heart Rate Source Patient Position - Orthostatic VS BP Location FiO2 (%)   11/11/23 1634 11/11/23 1636 11/11/23 1636 11/11/23 1636 --   Oral Monitor Lying Left arm       Pain Score       11/11/23 1705       10 - Worst Possible Pain         Physical Exam  Vitals and nursing note reviewed. Constitutional:       General: She is not in acute distress. Appearance: She is well-developed. HENT:      Head: Normocephalic and atraumatic. Eyes:      Pupils: Pupils are equal, round, and reactive to light. Neck:      Vascular: No JVD. Cardiovascular:      Rate and Rhythm: Normal rate and regular rhythm. Heart sounds: Normal heart sounds. No murmur heard. No friction rub. No gallop. Pulmonary:      Effort: Pulmonary effort is normal. No respiratory distress. Breath sounds: Normal breath sounds. No wheezing or rales. Chest:      Chest wall: No tenderness. Abdominal:      Tenderness: There is abdominal tenderness in the epigastric area. There is no guarding or rebound. Musculoskeletal:         General: No tenderness. Normal range of motion. Cervical back: Normal range of motion. Skin:     General: Skin is warm and dry. Neurological:      General: No focal deficit present. Mental Status: She is alert and oriented to person, place, and time. Psychiatric:         Behavior: Behavior normal.         Thought Content: Thought content normal.         Judgment: Judgment normal.       A/P;  Background: 59 y.o. female presents with chief complaint of epigastric abdominal pain.      Differential includes but is not limited to: pancreatitis, gastritis, cholecystitis, choledocholithiasis,  pyelonephritis, ureterolithiasis, non specific abdominal pain    Plan: cbc, cmp, lipase, urinalysis, ct scan, symptom control         ED Course     Labs Reviewed   CBC AND DIFFERENTIAL - Abnormal       Result Value Ref Range Status    WBC 11.01 (*) 4.31 - 10.16 Thousand/uL Final    RBC 3.93  3.81 - 5.12 Million/uL Final    Hemoglobin 11.2 (*) 11.5 - 15.4 g/dL Final    Hematocrit 35.6  34.8 - 46.1 % Final    MCV 91  82 - 98 fL Final    MCH 28.5  26.8 - 34.3 pg Final    MCHC 31.5  31.4 - 37.4 g/dL Final    RDW 13.7  11.6 - 15.1 % Final    MPV 10.3  8.9 - 12.7 fL Final    Platelets 096  349 - 390 Thousands/uL Final    nRBC 0  /100 WBCs Final    Neutrophils Relative 59  43 - 75 % Final    Immat GRANS % 0  0 - 2 % Final    Lymphocytes Relative 31  14 - 44 % Final    Monocytes Relative 7  4 - 12 % Final    Eosinophils Relative 3  0 - 6 % Final    Basophils Relative 0  0 - 1 % Final    Neutrophils Absolute 6.48  1.85 - 7.62 Thousands/µL Final    Immature Grans Absolute 0.04  0.00 - 0.20 Thousand/uL Final    Lymphocytes Absolute 3.40  0.60 - 4.47 Thousands/µL Final    Monocytes Absolute 0.79  0.17 - 1.22 Thousand/µL Final    Eosinophils Absolute 0.27  0.00 - 0.61 Thousand/µL Final    Basophils Absolute 0.03  0.00 - 0.10 Thousands/µL Final   LIPASE - Normal    Lipase 40  11 - 82 u/L Final   PLATELET COUNT - Normal    Platelets 297  301 - 390 Thousands/uL Final    MPV 9.9  8.9 - 12.7 fL Final   COMPREHENSIVE METABOLIC PANEL    Sodium 863  135 - 147 mmol/L Final    Potassium 3.7  3.5 - 5.3 mmol/L Final    Chloride 107  96 - 108 mmol/L Final    CO2 26  21 - 32 mmol/L Final    ANION GAP 6  mmol/L Final    BUN 12  5 - 25 mg/dL Final    Creatinine 0.73  0.60 - 1.30 mg/dL Final    Comment: Standardized to IDMS reference method    Glucose 93  65 - 140 mg/dL Final    Comment: If the patient is fasting, the ADA then defines impaired fasting glucose as > 100 mg/dL and diabetes as > or equal to 123 mg/dL. Calcium 8.8  8.4 - 10.2 mg/dL Final    AST 16  13 - 39 U/L Final    ALT 14  7 - 52 U/L Final    Comment: Specimen collection should occur prior to Sulfasalazine administration due to the potential for falsely depressed results. Alkaline Phosphatase 67  34 - 104 U/L Final    Total Protein 7.4  6.4 - 8.4 g/dL Final    Albumin 3.9  3.5 - 5.0 g/dL Final    Total Bilirubin 0.33  0.20 - 1.00 mg/dL Final    Comment: Use of this assay is not recommended for patients undergoing treatment with eltrombopag due to the potential for falsely elevated results.   N-acetyl-p-benzoquinone imine (metabolite of Acetaminophen) will generate erroneously low results in samples for patients that have taken an overdose of Acetaminophen. eGFR 88  ml/min/1.73sq m Final    Narrative:     [place] guidelines for Chronic Kidney Disease (CKD):     Stage 1 with normal or high GFR (GFR > 90 mL/min/1.73 square meters)    Stage 2 Mild CKD (GFR = 60-89 mL/min/1.73 square meters)    Stage 3A Moderate CKD (GFR = 45-59 mL/min/1.73 square meters)    Stage 3B Moderate CKD (GFR = 30-44 mL/min/1.73 square meters)    Stage 4 Severe CKD (GFR = 15-29 mL/min/1.73 square meters)    Stage 5 End Stage CKD (GFR <15 mL/min/1.73 square meters)  Note: GFR calculation is accurate only with a steady state creatinine   TYPE AND SCREEN    ABO Grouping O   Final    Rh Factor Positive   Final    Antibody Screen Negative   Final    Specimen Expiration Date [id number]   Final   ABORH RECHECK    ABO Grouping O   Final    Rh Factor Positive   Final     CT abdomen pelvis with contrast   Final Result      1) Intussusception of the cecum and terminal ileum into the ascending colon secondary to a 5.1 x 2.7 cm soft tissue density, highly suspicious for an underlying mass. Inflammation in the terminal ileum and cecum acting as a lead point would be unusual in    an adult patient. 2) No findings to suggest obstruction at this time. Fluid is seen in the ascending colon. 3) Several adjacent prominent mesenteric lymph nodes, which may be reactive, although metastatic disease is not entirely excluded. 4) No evidence of metastatic disease elsewhere in the abdomen or pelvis. 5) Mild circumferential wall thickening of the urinary bladder, which may be related to cystitis. Recommend correlation with urinalysis. 6) Additional findings as above. I personally discussed this study with [facility] on 11/11/2023 at 8:47 PM.                     Workstation performed: [id number]         .   Medications   methocarbamol (ROBAXIN) tablet 500 mg (500 mg Oral Given 11/13/23 1421)   ondansetron (ZOFRAN) injection 4 mg ( Intravenous MAR Unhold 11/12/23 1036)   acetaminophen (TYLENOL) tablet 975 mg (975 mg Oral Not Given 11/13/23 1223)   gabapentin (NEURONTIN) capsule 100 mg (100 mg Oral Given 11/13/23 1119)   HYDROmorphone (DILAUDID) 1 mg/mL 50 mL PCA ( Intravenous Rate/Dose Verify 11/13/23 0257)   diphenhydrAMINE (BENADRYL) injection 25 mg (has no administration in time range)   naloxone [facility]) injection 0.1 mg (has no administration in time range)   enoxaparin (LOVENOX) subcutaneous injection 40 mg (40 mg Subcutaneous Given 11/13/23 1420)   dextrose 5 % and sodium chloride 0.45 % with KCl 20 mEq/L infusion (50 mL/hr Intravenous New Bag 11/13/23 0933)   HYDROmorphone (DILAUDID) injection 0.5 mg (0.5 mg Intravenous Given 11/11/23 1705)   Famotidine (PF) (PEPCID) injection 20 mg (20 mg Intravenous Given 11/11/23 1705)   aluminum-magnesium hydroxide-simethicone (MAALOX) oral suspension 30 mL (30 mL Oral Given 11/11/23 1705)   iohexol (OMNIPAQUE) 350 MG/ML injection (MULTI-DOSE) 100 mL (100 mL Intravenous Given 11/11/23 1855)   HYDROmorphone (DILAUDID) injection 0.5 mg (0.5 mg Intravenous Given 11/11/23 2023)   ceFAZolin (ANCEF) IVPB (premix in dextrose) 2,000 mg 50 mL ( Intravenous MAR Unhold 11/12/23 0813)   bupivacaine liposomal (EXPAREL) 1.3 % injection 20 mL (20 mL Infiltration Given 11/12/23 0836)   potassium phosphates 21 mmol in sodium chloride 0.9 % 250 mL infusion (21 mmol Intravenous New Bag 11/13/23 1114)         Critical Care Time  Procedures    Time reflects when diagnosis was documented in both MDM as applicable and the Disposition within this note       Time User Action Codes Description Comment    11/11/2023  9:10 PM Tiffanie Sosa Add [R19.00] Abdominal mass     11/11/2023 10:12 PM Esdras Wright Add [K56.1] Intussusception of colon ([address])     11/12/2023  9:24 AM Aidan Boogie Modify [K56.1] Intussusception of colon ([address]) ED Disposition       ED Disposition   Admit    Condition   Stable    Date/Time   Sat Nov 11, 2023 10:38 PM    Comment   Case was discussed with Dr Diamantina Carrel and the patient's admission status was agreed to be Admission Status: inpatient status to the service of Dr. Jeffrey Phillips .                Follow-up Information    None

## 2023-11-11 NOTE — ED PROVIDER NOTES
History  Chief Complaint   Patient presents with    Abdominal Pain     Patient presents for upper abdominal pain causing her to have shallow breathing. Patient states she did have an episode of black stools and states there was blood when she wiped. This has been going on for a week. (Angel Luis Andrea) Angel Luis Andrea is a 59 y.o. female who identifies as female    They presented to the emergency department on November 11, 2023. Patient presents with:  Abdominal Pain: Patient presents for upper abdominal pain causing her to have shallow breathing. Patient states she did have an episode of black stools and states there was blood when she wiped. This has been going on for a week. .    The patient states that she has been having epigastric abdominal pain for the last 2 weeks. Onset was sudden, sharp in nature. The last 2 days, pain has been more frequent and stronger. She also reports diarrhea during the last 2 days which was mixed with bright red blood however today she had dark stool and worse pain, reason why she presented to the ED. Patient also reports some shortness of breath with the abdominal pain. Patient denies nausea, vomiting, chest pain, fever, recent illness, or any other complaint at this time. Allergies include:  No Known Allergies      Immunizations:    Immunization History  Administered            Date(s) Administered    COVID-19 PFIZER VACCINE 0.3 ML IM                          03/25/2021 04/18/2021 01/22/2022      Tdap                  02/26/2020    Immunizations Reviewed. Prior to Admission Medications   Prescriptions Last Dose Informant Patient Reported? Taking?    Calcium Carbonate (CALCIUM 500 PO)   Yes Yes   Sig: Take 1 tablet by mouth 2 (two) times a day   cholecalciferol (VITAMIN D3) 1,000 units tablet   Yes Yes   Sig: Take 1,000 Units by mouth daily   cyanocobalamin (VITAMIN B-12) 1000 MCG tablet   Yes Yes   Sig: Take 1,000 mcg by mouth daily   multivitamin [facility]) TABS   Yes Yes   Sig: Take 1 tablet by mouth daily      Facility-Administered Medications: None       Past Medical History:   Diagnosis Date    Abdominal pain     "burning pain" came to the ED    Renal disorder     Wears dentures     permanent upper denture    Weight loss     20 lb wt loss in 2-3 weeks d/t stomach issues       Past Surgical History:   Procedure Laterality Date    CHOLECYSTECTOMY      TUBAL LIGATION      at 29        Family History   Problem Relation Age of Onset    Arthritis Mother     Heart attack Father     Coronary artery disease Father     Glaucoma Father     Heart disease Father      I have reviewed and agree with the history as documented. E-Cigarette/Vaping    E-Cigarette Use Never User      E-Cigarette/Vaping Substances     Social History     Tobacco Use    Smoking status: Never    Smokeless tobacco: Never    Tobacco comments:     social    Vaping Use    Vaping Use: Never used   Substance Use Topics    Alcohol use: Not Currently    Drug use: Never        Review of Systems   Constitutional:  Negative for chills and fever. HENT:  Negative for ear pain and sore throat. Eyes:  Negative for pain and visual disturbance. Respiratory:  Positive for shortness of breath (Intermittent). Negative for cough. Cardiovascular:  Negative for chest pain and palpitations. Gastrointestinal:  Positive for abdominal pain (Epigastric). Negative for vomiting. Genitourinary:  Negative for dysuria and hematuria. Musculoskeletal:  Negative for arthralgias and back pain. Skin:  Negative for color change and rash. Neurological:  Negative for seizures and syncope. All other systems reviewed and are negative.       Physical Exam  ED Triage Vitals   Temperature Pulse Respirations Blood Pressure SpO2   11/11/23 1634 11/11/23 1636 11/11/23 1636 11/11/23 1636 11/11/23 1636   98.4 °F (36.9 °C) 74 20 149/73 97 %      Temp Source Heart Rate Source Patient Position - Orthostatic VS BP Location FiO2 (%)   11/11/23 1634 11/11/23 1636 11/11/23 1636 11/11/23 1636 --   Oral Monitor Lying Left arm       Pain Score       11/11/23 1705       10 - Worst Possible Pain             Orthostatic Vital Signs  Vitals:    11/11/23 1930 11/11/23 2000 11/11/23 2030 11/11/23 2100   BP: 127/63 129/60 134/61 140/70   Pulse: 69 69 69 77   Patient Position - Orthostatic VS: Sitting Sitting Sitting Sitting       Physical Exam  Vitals and nursing note reviewed. Constitutional:       General: She is not in acute distress. Appearance: She is well-developed. HENT:      Head: Normocephalic and atraumatic. Eyes:      Conjunctiva/sclera: Conjunctivae normal.   Cardiovascular:      Rate and Rhythm: Normal rate and regular rhythm. Heart sounds: No murmur heard. Pulmonary:      Effort: Pulmonary effort is normal. No respiratory distress. Breath sounds: Normal breath sounds. Abdominal:      General: There is no distension. Palpations: Abdomen is soft. Tenderness: There is abdominal tenderness in the epigastric area. There is no guarding or rebound. Comments: Epigastric tenderness on palpation   Musculoskeletal:         General: No swelling. Cervical back: Neck supple. Skin:     General: Skin is warm and dry. Capillary Refill: Capillary refill takes less than 2 seconds. Neurological:      Mental Status: She is alert.    Psychiatric:         Mood and Affect: Mood normal.         ED Medications  Medications   HYDROmorphone (DILAUDID) injection 0.5 mg (0.5 mg Intravenous Given 11/11/23 1705)   Famotidine (PF) (PEPCID) injection 20 mg (20 mg Intravenous Given 11/11/23 1705)   aluminum-magnesium hydroxide-simethicone (MAALOX) oral suspension 30 mL (30 mL Oral Given 11/11/23 1705)   iohexol (OMNIPAQUE) 350 MG/ML injection (MULTI-DOSE) 100 mL (100 mL Intravenous Given 11/11/23 1855)   HYDROmorphone (DILAUDID) injection 0.5 mg (0.5 mg Intravenous Given 11/11/23 2023)       Diagnostic Studies  Results Reviewed       Procedure Component Value Units Date/Time    Comprehensive metabolic panel [[id number]] Collected: 11/11/23 1700    Lab Status: Final result Specimen: Blood from Arm, Right Updated: 11/11/23 1722     Sodium 139 mmol/L      Potassium 3.7 mmol/L      Chloride 107 mmol/L      CO2 26 mmol/L      ANION GAP 6 mmol/L      BUN 12 mg/dL      Creatinine 0.73 mg/dL      Glucose 93 mg/dL      Calcium 8.8 mg/dL      AST 16 U/L      ALT 14 U/L      Alkaline Phosphatase 67 U/L      Total Protein 7.4 g/dL      Albumin 3.9 g/dL      Total Bilirubin 0.33 mg/dL      eGFR 88 ml/min/1.73sq m     Narrative:      National Kid[employer] Disease Foundation guidelines for Chronic Kidney Disease (CKD):     Stage 1 with normal or high GFR (GFR > 90 mL/min/1.73 square meters)    Stage 2 Mild CKD (GFR = 60-89 mL/min/1.73 square meters)    Stage 3A Moderate CKD (GFR = 45-59 mL/min/1.73 square meters)    Stage 3B Moderate CKD (GFR = 30-44 mL/min/1.73 square meters)    Stage 4 Severe CKD (GFR = 15-29 mL/min/1.73 square meters)    Stage 5 End Stage CKD (GFR <15 mL/min/1.73 square meters)  Note: GFR calculation is accurate only with a steady state creatinine    Lipase [[id number]]  (Normal) Collected: 11/11/23 1700    Lab Status: Final result Specimen: Blood from Arm, Right Updated: 11/11/23 1722     Lipase 40 u/L     CBC and differential [[id number]]  (Abnormal) Collected: 11/11/23 1700    Lab Status: Final result Specimen: Blood from Arm, Right Updated: 11/11/23 1708     WBC 11.01 Thousand/uL      RBC 3.93 Million/uL      Hemoglobin 11.2 g/dL      Hematocrit 35.6 %      MCV 91 fL      MCH 28.5 pg      MCHC 31.5 g/dL      RDW 13.7 %      MPV 10.3 fL      Platelets 107 Thousands/uL      nRBC 0 /100 WBCs      Neutrophils Relative 59 %      Immat GRANS % 0 %      Lymphocytes Relative 31 %      Monocytes Relative 7 %      Eosinophils Relative 3 %      Basophils Relative 0 %      Neutrophils Absolute 6.48 Thousands/µL      Immature Grans Absolute 0.04 Thousand/uL      Lymphocytes Absolute 3.40 Thousands/µL      Monocytes Absolute 0.79 Thousand/µL      Eosinophils Absolute 0.27 Thousand/µL      Basophils Absolute 0.03 Thousands/µL                    CT abdomen pelvis with contrast   Final Result by Jo-Ann Sandoval MD (11/11 2049)      1) Intussusception of the cecum and terminal ileum into the ascending colon secondary to a 5.1 x 2.7 cm soft tissue density, highly suspicious for an underlying mass. Inflammation in the terminal ileum and cecum acting as a lead point would be unusual in    an adult patient. 2) No findings to suggest obstruction at this time. Fluid is seen in the ascending colon. 3) Several adjacent prominent mesenteric lymph nodes, which may be reactive, although metastatic disease is not entirely excluded. 4) No evidence of metastatic disease elsewhere in the abdomen or pelvis. 5) Mild circumferential wall thickening of the urinary bladder, which may be related to cystitis. Recommend correlation with urinalysis. 6) Additional findings as above. I personally discussed this study with [address] [place] [address] on 11/11/2023 at 8:47 PM.                     Workstation performed: [id number]               Procedures  ECG 12 Lead Documentation Only    Date/Time: 11/11/2023 9:28 PM    Performed by: Nabeel Dubose MD  Authorized by: Nabeel Dubose MD    Indications / Diagnosis:  Abdominal pain, shortness of breath  ECG reviewed by me, the ED Provider: yes    Patient location:  ED  Previous ECG:     Previous ECG:  Compared to current    Comparison ECG info:   May 10, 2023    Similarity:  No change    Comparison to cardiac monitor: Yes    Interpretation:     Interpretation: normal    Rate:     ECG rate:  71 bpm    ECG rate assessment: normal    Rhythm:     Rhythm: sinus rhythm    Ectopy:     Ectopy: none    QRS:     QRS axis:  Normal    QRS intervals:  Normal  Conduction: Conduction: normal    ST segments:     ST segments:  Normal  T waves:     T waves: normal    Comments:      Ventricular rate 71 bpm, MA interval 150 ms, QRS duration 74 ms,  ms, QTc 423 ms  No acute ST segment elevation or depression. Normal sinus rhythm. ED Course  ED Course as of 11/11/23 2135   Sat Nov 11, 2023   162 52-year-old female presents for evaluation of epigastric abdominal pain, bloody diarrhea   1724 Ordered CBC CMP EKG lipase CT abdomen pelvis   1724 FOBT test positive   1724 Gave Pepcid hydromorphone Maalox for symptomatic relief of pain   1747 CBC and differential(!)  Mild leukocytosis present this could be in the setting of anemia infectious etiology or vomiting. Hemoglobin of 11.2 due to hematochezia confirmed by positive FOBT   1749 Comprehensive metabolic panel  Wnl, no anion gap, no LILO, normal T. bili   1749 Lipase: 40   1804 On reevaluation, patient reported that abdominal pain has gotten much better. 2052 CT abdomen pelvis with contrast   Intussusception of the cecum and terminal ileum into the ascending colon secondary to a 5.1 x 2.7 cm soft tissue density, highly suspicious for an underlying mass. Inflammation in the terminal ileum and cecum acting as a lead point would be unusual in   an adult patient. 2127 Consulted surgery for suspicious mass on CT scan. Awaiting eval, patient signed out to Dr. Rodriguez Handing 20yo+      Flowsheet Row Most Recent Value   Initial Alcohol Screen: US AUDIT-C     1. How often do you have a drink containing alcohol? 0 Filed at: 11/11/2023 1641   2. How many drinks containing alcohol do you have on a typical day you are drinking? 0 Filed at: 11/11/2023 1641   3a. Male UNDER 65: How often do you have five or more drinks on one occasion? 0 Filed at: 11/11/2023 1641   3b. FEMALE Any Age, or MALE 65+: How often do you have 4 or more drinks on one occassion?  0 Filed at: 11/11/2023 1641   Audit-C Score 0 Filed at: 11/11/2023 1641   ANATOLIY: How many times in the past year have you. .. Used an illegal drug or used a prescription medication for non-medical reasons? Never Filed at: 11/11/2023 1641                  Medical Decision Making  Patient presents with:  Abdominal Pain: Patient presents for upper abdominal pain causing her to have shallow breathing. Patient states she did have an episode of black stools and states there was blood when she wiped. This has been going on for a week. Patient seen and examined noted to have abdominal pain, shortness of breath. Temp:  [98.4 °F (36.9 °C)] 98.4 °F (36.9 °C)  HR:  [67-78] 77  Resp:  [16-20] 16  BP: (120-168)/(56-79) 140/70    Differential diagnosis includes but is not limited to gastritis, pancreatitis, malignancy among others.       Due to patient's history and presentation the following laboratory evidence was collected:  Recent Results (from the past 12 hour(s))  -ECG 12 lead:   Collection Time: 11/11/23  4:41 PM       Result                      Value             Ref Range           Ventricular Rate            71                BPM                 Atrial Rate                 71                BPM                 MI Interval                 150               ms                  QRSD Interval               74                ms                  QT Interval                 390               ms                  QTC Interval                423               ms                  P Axis                      58                degrees             QRS Axis                    58                degrees             T Wave Axis                 48                degrees        -CBC and differential:   Collection Time: 11/11/23  5:00 PM       Result                      Value             Ref Range           WBC                         11.01 (H)         4.31 - 10.16*       RBC                         3.93              3.81 - 5.12 *       Hemoglobin                  11.2 (L) 11.5 - 15.4 *       Hematocrit                  35.6              34.8 - 46.1 %       MCV                         91                82 - 98 fL          MCH                         28.5              26.8 - 34.3 *       MCHC                        31.5              31.4 - 37.4 *       RDW                         13.7              11.6 - 15.1 %       MPV                         10.3              8.9 - 12.7 fL       Platelets                   320               149 - 390 Th*       nRBC                        0                 /100 WBCs           Neutrophils Relative        59                43 - 75 %           Immat GRANS %               0                 0 - 2 %             Lymphocytes Relative        31                14 - 44 %           Monocytes Relative          7                 4 - 12 %            Eosinophils Relative        3                 0 - 6 %             Basophils Relative          0                 0 - 1 %             Neutrophils Absolute        6.48              1.85 - 7.62 *       Immature Grans Absolute     0.04              0.00 - 0.20 *       Lymphocytes Absolute        3.40              0.60 - 4.47 *       Monocytes Absolute          0.79              0.17 - 1.22 *       Eosinophils Absolute        0.27              0.00 - 0.61 *       Basophils Absolute          0.03              0.00 - 0.10 *  -Comprehensive metabolic panel:   Collection Time: 11/11/23  5:00 PM       Result                      Value             Ref Range           Sodium                      139               135 - 147 mm*       Potassium                   3.7               3.5 - 5.3 mm*       Chloride                    107               96 - 108 mmo*       CO2                         26                21 - 32 mmol*       ANION GAP                   6                 mmol/L              BUN                         12                5 - 25 mg/dL        Creatinine                  0.73              0.60 - 1.30 *       Glucose 93                65 - 140 mg/*       Calcium                     8.8               8.4 - 10.2 m*       AST                         16                13 - 39 U/L         ALT                         14                7 - 52 U/L          Alkaline Phosphatase        67                34 - 104 U/L        Total Protein               7.4               6.4 - 8.4 g/*       Albumin                     3.9               3.5 - 5.0 g/*       Total Bilirubin             0.33              0.20 - 1.00 *       eGFR                        88                ml/min/1.73s*  -Lipase:   Collection Time: 11/11/23  5:00 PM       Result                      Value             Ref Range           Lipase                      40                11 - 82 u/L      Due to patient's history and presentation the following imaging was collected:  CT abdomen pelvis with contrast   Final Result        1) Intussusception of the cecum and terminal ileum into the ascending colon secondary to a 5.1 x 2.7 cm soft tissue density, highly suspicious for an underlying mass. Inflammation in the terminal ileum and cecum acting as a lead point would be unusual in     an adult patient. 2) No findings to suggest obstruction at this time. Fluid is seen in the ascending colon. 3) Several adjacent prominent mesenteric lymph nodes, which may be reactive, although metastatic disease is not entirely excluded. 4) No evidence of metastatic disease elsewhere in the abdomen or pelvis. 5) Mild circumferential wall thickening of the urinary bladder, which may be related to cystitis. Recommend correlation with urinalysis. 6) Additional findings as above. I personally discussed this study with Darrick Rizvi on 11/11/2023 at 8:47 PM.                            Workstation performed: [id number]         No results found. EKG: interpreted by myself as above.     Patient was administered:      Medication Administration - last 24 hours from 11/10/2023 2130 to   11/11/2023 2130       Date/Time Order Dose Route Action Action by     11/11/2023 1705 EST HYDROmorphone (DILAUDID) injection 0.5 mg 0.5 mg   Intravenous Given Nicholas Rayasher     11/11/2023 1705 EST Famotidine (PF) (PEPCID) injection 20 mg 20 mg   Intravenous Given Nicholas Rayarehana     11/11/2023 1705 EST aluminum-magnesium hydroxide-simethicone (MAALOX)   oral suspension 30 mL 30 mL Oral Given Nicholas Rayasher     11/11/2023 1855 EST iohexol (OMNIPAQUE) 350 MG/ML injection (MULTI-DOSE)   100 mL 100 mL Intravenous Given Carolina Snow     11/11/2023 2023 EST HYDROmorphone (DILAUDID) injection 0.5 mg 0.5 mg   Intravenous Given Nicholas Linder        Discussed patient's case with inpatient surgical team for consultation regarding admission . Awaiting recommendation, patient signed out to Dr. Trudi Sadler        Amount and/or Complexity of Data Reviewed  Labs: ordered. Radiology: ordered. Risk  OTC drugs. Prescription drug management. Disposition  Final diagnoses:   Abdominal mass     Time reflects when diagnosis was documented in both MDM as applicable and the Disposition within this note       Time User Action Codes Description Comment    11/11/2023  9:10 PM Gavino Sosa Francisco Add [R19.00] Abdominal mass           ED Disposition       None          Follow-up Information    None         Patient's Medications   Discharge Prescriptions    No medications on file     No discharge procedures on file. PDMP Review       None             ED Provider  Attending physically available and evaluated Lia Graydarian ABREU managed the patient along with the ED Attending.     Electronically Signed by           Becki Mcneil MD  11/11/23 2135

## 2023-11-12 ENCOUNTER — ANESTHESIA EVENT (INPATIENT)
Dept: PERIOP | Facility: HOSPITAL | Age: 63
DRG: 330 | End: 2023-11-12
Payer: COMMERCIAL

## 2023-11-12 ENCOUNTER — ANESTHESIA (INPATIENT)
Dept: PERIOP | Facility: HOSPITAL | Age: 63
DRG: 330 | End: 2023-11-12
Payer: COMMERCIAL

## 2023-11-12 LAB
ABO GROUP BLD: NORMAL
ANION GAP SERPL CALCULATED.3IONS-SCNC: 5 MMOL/L
ATRIAL RATE: 71 BPM
BASOPHILS # BLD AUTO: 0.04 THOUSANDS/ÂΜL (ref 0–0.1)
BASOPHILS NFR BLD AUTO: 0 % (ref 0–1)
BUN SERPL-MCNC: 12 MG/DL (ref 5–25)
CALCIUM SERPL-MCNC: 8.1 MG/DL (ref 8.4–10.2)
CEA SERPL-MCNC: 0.9 NG/ML (ref 0–3)
CHLORIDE SERPL-SCNC: 106 MMOL/L (ref 96–108)
CO2 SERPL-SCNC: 28 MMOL/L (ref 21–32)
CREAT SERPL-MCNC: 0.56 MG/DL (ref 0.6–1.3)
EOSINOPHIL # BLD AUTO: 0.32 THOUSAND/ÂΜL (ref 0–0.61)
EOSINOPHIL NFR BLD AUTO: 4 % (ref 0–6)
ERYTHROCYTE [DISTWIDTH] IN BLOOD BY AUTOMATED COUNT: 14.2 % (ref 11.6–15.1)
GFR SERPL CREATININE-BSD FRML MDRD: 100 ML/MIN/1.73SQ M
GLUCOSE SERPL-MCNC: 94 MG/DL (ref 65–140)
HCT VFR BLD AUTO: 32.4 % (ref 34.8–46.1)
HGB BLD-MCNC: 10.4 G/DL (ref 11.5–15.4)
IMM GRANULOCYTES # BLD AUTO: 0.02 THOUSAND/UL (ref 0–0.2)
IMM GRANULOCYTES NFR BLD AUTO: 0 % (ref 0–2)
LYMPHOCYTES # BLD AUTO: 2.85 THOUSANDS/ÂΜL (ref 0.6–4.47)
LYMPHOCYTES NFR BLD AUTO: 32 % (ref 14–44)
MAGNESIUM SERPL-MCNC: 2.2 MG/DL (ref 1.9–2.7)
MCH RBC QN AUTO: 29.3 PG (ref 26.8–34.3)
MCHC RBC AUTO-ENTMCNC: 32.1 G/DL (ref 31.4–37.4)
MCV RBC AUTO: 91 FL (ref 82–98)
MONOCYTES # BLD AUTO: 0.6 THOUSAND/ÂΜL (ref 0.17–1.22)
MONOCYTES NFR BLD AUTO: 7 % (ref 4–12)
NEUTROPHILS # BLD AUTO: 5.11 THOUSANDS/ÂΜL (ref 1.85–7.62)
NEUTS SEG NFR BLD AUTO: 57 % (ref 43–75)
NRBC BLD AUTO-RTO: 0 /100 WBCS
P AXIS: 58 DEGREES
PHOSPHATE SERPL-MCNC: 3.1 MG/DL (ref 2.3–4.1)
PLATELET # BLD AUTO: 293 THOUSANDS/UL (ref 149–390)
PMV BLD AUTO: 10.5 FL (ref 8.9–12.7)
POTASSIUM SERPL-SCNC: 3.7 MMOL/L (ref 3.5–5.3)
PR INTERVAL: 150 MS
QRS AXIS: 58 DEGREES
QRSD INTERVAL: 74 MS
QT INTERVAL: 390 MS
QTC INTERVAL: 423 MS
RBC # BLD AUTO: 3.55 MILLION/UL (ref 3.81–5.12)
RH BLD: POSITIVE
SODIUM SERPL-SCNC: 139 MMOL/L (ref 135–147)
T WAVE AXIS: 48 DEGREES
VENTRICULAR RATE: 71 BPM
WBC # BLD AUTO: 8.94 THOUSAND/UL (ref 4.31–10.16)

## 2023-11-12 PROCEDURE — C9290 INJ, BUPIVACAINE LIPOSOME: HCPCS | Performed by: SURGERY

## 2023-11-12 PROCEDURE — 99232 SBSQ HOSP IP/OBS MODERATE 35: CPT | Performed by: PHYSICIAN ASSISTANT

## 2023-11-12 PROCEDURE — 0WQF0ZZ REPAIR ABDOMINAL WALL, OPEN APPROACH: ICD-10-PCS | Performed by: SURGERY

## 2023-11-12 PROCEDURE — 88304 TISSUE EXAM BY PATHOLOGIST: CPT | Performed by: PATHOLOGY

## 2023-11-12 PROCEDURE — 83735 ASSAY OF MAGNESIUM: CPT

## 2023-11-12 PROCEDURE — 88365 INSITU HYBRIDIZATION (FISH): CPT | Performed by: PATHOLOGY

## 2023-11-12 PROCEDURE — 94760 N-INVAS EAR/PLS OXIMETRY 1: CPT

## 2023-11-12 PROCEDURE — 93010 ELECTROCARDIOGRAM REPORT: CPT | Performed by: INTERNAL MEDICINE

## 2023-11-12 PROCEDURE — 88341 IMHCHEM/IMCYTCHM EA ADD ANTB: CPT | Performed by: PATHOLOGY

## 2023-11-12 PROCEDURE — 88368 INSITU HYBRIDIZATION MANUAL: CPT | Performed by: PATHOLOGY

## 2023-11-12 PROCEDURE — 80048 BASIC METABOLIC PNL TOTAL CA: CPT

## 2023-11-12 PROCEDURE — 85025 COMPLETE CBC W/AUTO DIFF WBC: CPT

## 2023-11-12 PROCEDURE — 44140 PARTIAL REMOVAL OF COLON: CPT | Performed by: SURGERY

## 2023-11-12 PROCEDURE — 88360 TUMOR IMMUNOHISTOCHEM/MANUAL: CPT | Performed by: PATHOLOGY

## 2023-11-12 PROCEDURE — 0DTF0ZZ RESECTION OF RIGHT LARGE INTESTINE, OPEN APPROACH: ICD-10-PCS | Performed by: SURGERY

## 2023-11-12 PROCEDURE — 88309 TISSUE EXAM BY PATHOLOGIST: CPT | Performed by: PATHOLOGY

## 2023-11-12 PROCEDURE — 84100 ASSAY OF PHOSPHORUS: CPT

## 2023-11-12 PROCEDURE — 0FB00ZZ EXCISION OF LIVER, OPEN APPROACH: ICD-10-PCS | Performed by: SURGERY

## 2023-11-12 PROCEDURE — 88342 IMHCHEM/IMCYTCHM 1ST ANTB: CPT | Performed by: PATHOLOGY

## 2023-11-12 RX ORDER — BUPIVACAINE HYDROCHLORIDE 2.5 MG/ML
INJECTION, SOLUTION EPIDURAL; INFILTRATION; INTRACAUDAL AS NEEDED
Status: DISCONTINUED | OUTPATIENT
Start: 2023-11-12 | End: 2023-11-12 | Stop reason: HOSPADM

## 2023-11-12 RX ORDER — DIPHENHYDRAMINE HYDROCHLORIDE 50 MG/ML
25 INJECTION INTRAMUSCULAR; INTRAVENOUS EVERY 6 HOURS PRN
Status: DISCONTINUED | OUTPATIENT
Start: 2023-11-12 | End: 2023-11-16 | Stop reason: HOSPADM

## 2023-11-12 RX ORDER — ALBUMIN, HUMAN INJ 5% 5 %
SOLUTION INTRAVENOUS CONTINUOUS PRN
Status: DISCONTINUED | OUTPATIENT
Start: 2023-11-12 | End: 2023-11-12

## 2023-11-12 RX ORDER — ENOXAPARIN SODIUM 100 MG/ML
40 INJECTION SUBCUTANEOUS EVERY 24 HOURS
Status: DISCONTINUED | OUTPATIENT
Start: 2023-11-12 | End: 2023-11-16 | Stop reason: HOSPADM

## 2023-11-12 RX ORDER — ONDANSETRON 2 MG/ML
INJECTION INTRAMUSCULAR; INTRAVENOUS AS NEEDED
Status: DISCONTINUED | OUTPATIENT
Start: 2023-11-12 | End: 2023-11-12

## 2023-11-12 RX ORDER — HYDROMORPHONE HCL/PF 1 MG/ML
SYRINGE (ML) INJECTION AS NEEDED
Status: DISCONTINUED | OUTPATIENT
Start: 2023-11-12 | End: 2023-11-12

## 2023-11-12 RX ORDER — GABAPENTIN 100 MG/1
100 CAPSULE ORAL 4 TIMES DAILY
Status: DISCONTINUED | OUTPATIENT
Start: 2023-11-12 | End: 2023-11-16 | Stop reason: HOSPADM

## 2023-11-12 RX ORDER — ACETAMINOPHEN 325 MG/1
975 TABLET ORAL EVERY 6 HOURS SCHEDULED
Status: DISCONTINUED | OUTPATIENT
Start: 2023-11-12 | End: 2023-11-16 | Stop reason: HOSPADM

## 2023-11-12 RX ORDER — HYDROMORPHONE HCL/PF 1 MG/ML
0.5 SYRINGE (ML) INJECTION
Status: DISCONTINUED | OUTPATIENT
Start: 2023-11-12 | End: 2023-11-12 | Stop reason: HOSPADM

## 2023-11-12 RX ORDER — DEXAMETHASONE SODIUM PHOSPHATE 10 MG/ML
INJECTION, SOLUTION INTRAMUSCULAR; INTRAVENOUS AS NEEDED
Status: DISCONTINUED | OUTPATIENT
Start: 2023-11-12 | End: 2023-11-12

## 2023-11-12 RX ORDER — ONDANSETRON 2 MG/ML
4 INJECTION INTRAMUSCULAR; INTRAVENOUS EVERY 4 HOURS PRN
Status: DISCONTINUED | OUTPATIENT
Start: 2023-11-12 | End: 2023-11-12 | Stop reason: SDUPTHER

## 2023-11-12 RX ORDER — MIDAZOLAM HYDROCHLORIDE 2 MG/2ML
INJECTION, SOLUTION INTRAMUSCULAR; INTRAVENOUS AS NEEDED
Status: DISCONTINUED | OUTPATIENT
Start: 2023-11-12 | End: 2023-11-12

## 2023-11-12 RX ORDER — ROCURONIUM BROMIDE 10 MG/ML
INJECTION, SOLUTION INTRAVENOUS AS NEEDED
Status: DISCONTINUED | OUTPATIENT
Start: 2023-11-12 | End: 2023-11-12

## 2023-11-12 RX ORDER — ONDANSETRON 2 MG/ML
4 INJECTION INTRAMUSCULAR; INTRAVENOUS ONCE AS NEEDED
Status: DISCONTINUED | OUTPATIENT
Start: 2023-11-12 | End: 2023-11-12 | Stop reason: HOSPADM

## 2023-11-12 RX ORDER — SUCCINYLCHOLINE/SOD CL,ISO/PF 100 MG/5ML
SYRINGE (ML) INTRAVENOUS AS NEEDED
Status: DISCONTINUED | OUTPATIENT
Start: 2023-11-12 | End: 2023-11-12

## 2023-11-12 RX ORDER — ONDANSETRON 2 MG/ML
4 INJECTION INTRAMUSCULAR; INTRAVENOUS EVERY 4 HOURS PRN
Status: DISCONTINUED | OUTPATIENT
Start: 2023-11-12 | End: 2023-11-16 | Stop reason: HOSPADM

## 2023-11-12 RX ORDER — SODIUM CHLORIDE, SODIUM LACTATE, POTASSIUM CHLORIDE, CALCIUM CHLORIDE 600; 310; 30; 20 MG/100ML; MG/100ML; MG/100ML; MG/100ML
INJECTION, SOLUTION INTRAVENOUS CONTINUOUS PRN
Status: DISCONTINUED | OUTPATIENT
Start: 2023-11-12 | End: 2023-11-12

## 2023-11-12 RX ORDER — METOCLOPRAMIDE HYDROCHLORIDE 5 MG/ML
10 INJECTION INTRAMUSCULAR; INTRAVENOUS ONCE AS NEEDED
Status: DISCONTINUED | OUTPATIENT
Start: 2023-11-12 | End: 2023-11-12 | Stop reason: HOSPADM

## 2023-11-12 RX ORDER — MAGNESIUM HYDROXIDE 1200 MG/15ML
LIQUID ORAL AS NEEDED
Status: DISCONTINUED | OUTPATIENT
Start: 2023-11-12 | End: 2023-11-12 | Stop reason: HOSPADM

## 2023-11-12 RX ORDER — NALOXONE HYDROCHLORIDE 0.4 MG/ML
0.1 INJECTION, SOLUTION INTRAMUSCULAR; INTRAVENOUS; SUBCUTANEOUS
Status: DISCONTINUED | OUTPATIENT
Start: 2023-11-12 | End: 2023-11-16 | Stop reason: HOSPADM

## 2023-11-12 RX ORDER — SODIUM CHLORIDE 9 MG/ML
INJECTION, SOLUTION INTRAVENOUS CONTINUOUS PRN
Status: DISCONTINUED | OUTPATIENT
Start: 2023-11-12 | End: 2023-11-12

## 2023-11-12 RX ORDER — ALBUTEROL SULFATE 90 UG/1
AEROSOL, METERED RESPIRATORY (INHALATION) AS NEEDED
Status: DISCONTINUED | OUTPATIENT
Start: 2023-11-12 | End: 2023-11-12

## 2023-11-12 RX ORDER — PROPOFOL 10 MG/ML
INJECTION, EMULSION INTRAVENOUS AS NEEDED
Status: DISCONTINUED | OUTPATIENT
Start: 2023-11-12 | End: 2023-11-12

## 2023-11-12 RX ORDER — FENTANYL CITRATE 50 UG/ML
INJECTION, SOLUTION INTRAMUSCULAR; INTRAVENOUS AS NEEDED
Status: DISCONTINUED | OUTPATIENT
Start: 2023-11-12 | End: 2023-11-12

## 2023-11-12 RX ORDER — LIDOCAINE HYDROCHLORIDE 20 MG/ML
INJECTION, SOLUTION EPIDURAL; INFILTRATION; INTRACAUDAL; PERINEURAL AS NEEDED
Status: DISCONTINUED | OUTPATIENT
Start: 2023-11-12 | End: 2023-11-12

## 2023-11-12 RX ADMIN — ALBUMIN (HUMAN): 12.5 INJECTION, SOLUTION INTRAVENOUS at 08:55

## 2023-11-12 RX ADMIN — CEFAZOLIN SODIUM 2000 MG: 2 SOLUTION INTRAVENOUS at 07:57

## 2023-11-12 RX ADMIN — GABAPENTIN 100 MG: 100 CAPSULE ORAL at 22:25

## 2023-11-12 RX ADMIN — SODIUM CHLORIDE, SODIUM LACTATE, POTASSIUM CHLORIDE, AND CALCIUM CHLORIDE: .6; .31; .03; .02 INJECTION, SOLUTION INTRAVENOUS at 07:57

## 2023-11-12 RX ADMIN — FENTANYL CITRATE 50 MCG: 50 INJECTION INTRAMUSCULAR; INTRAVENOUS at 08:26

## 2023-11-12 RX ADMIN — HYDROMORPHONE HYDROCHLORIDE 0.5 MG: 1 INJECTION, SOLUTION INTRAMUSCULAR; INTRAVENOUS; SUBCUTANEOUS at 09:56

## 2023-11-12 RX ADMIN — SODIUM CHLORIDE, SODIUM LACTATE, POTASSIUM CHLORIDE, AND CALCIUM CHLORIDE 125 ML/HR: .6; .31; .03; .02 INJECTION, SOLUTION INTRAVENOUS at 18:24

## 2023-11-12 RX ADMIN — GABAPENTIN 100 MG: 100 CAPSULE ORAL at 12:52

## 2023-11-12 RX ADMIN — ENOXAPARIN SODIUM 40 MG: 40 INJECTION SUBCUTANEOUS at 12:52

## 2023-11-12 RX ADMIN — ACETAMINOPHEN 975 MG: 325 TABLET, FILM COATED ORAL at 18:20

## 2023-11-12 RX ADMIN — PROPOFOL 150 MG: 10 INJECTION, EMULSION INTRAVENOUS at 08:03

## 2023-11-12 RX ADMIN — PHENYLEPHRINE HYDROCHLORIDE 100 MCG: 10 INJECTION INTRAVENOUS at 08:32

## 2023-11-12 RX ADMIN — OXYCODONE HYDROCHLORIDE 5 MG: 5 TABLET ORAL at 00:59

## 2023-11-12 RX ADMIN — GABAPENTIN 100 MG: 100 CAPSULE ORAL at 18:21

## 2023-11-12 RX ADMIN — SODIUM CHLORIDE, SODIUM LACTATE, POTASSIUM CHLORIDE, AND CALCIUM CHLORIDE 125 ML/HR: .6; .31; .03; .02 INJECTION, SOLUTION INTRAVENOUS at 10:57

## 2023-11-12 RX ADMIN — PHENYLEPHRINE HYDROCHLORIDE 100 MCG: 10 INJECTION INTRAVENOUS at 08:44

## 2023-11-12 RX ADMIN — PHENYLEPHRINE HYDROCHLORIDE 50 MCG/MIN: 10 INJECTION INTRAVENOUS at 08:33

## 2023-11-12 RX ADMIN — HEPARIN SODIUM 5000 UNITS: 5000 INJECTION INTRAVENOUS; SUBCUTANEOUS at 05:40

## 2023-11-12 RX ADMIN — ALBUMIN (HUMAN): 12.5 INJECTION, SOLUTION INTRAVENOUS at 08:33

## 2023-11-12 RX ADMIN — ONDANSETRON 4 MG: 2 INJECTION INTRAMUSCULAR; INTRAVENOUS at 08:03

## 2023-11-12 RX ADMIN — ACETAMINOPHEN 975 MG: 325 TABLET, FILM COATED ORAL at 12:52

## 2023-11-12 RX ADMIN — Medication: at 10:58

## 2023-11-12 RX ADMIN — ALBUTEROL SULFATE 4 PUFF: 90 AEROSOL, METERED RESPIRATORY (INHALATION) at 08:06

## 2023-11-12 RX ADMIN — DEXAMETHASONE SODIUM PHOSPHATE 10 MG: 10 INJECTION INTRAMUSCULAR; INTRAVENOUS at 08:03

## 2023-11-12 RX ADMIN — SUGAMMADEX 200 MG: 100 INJECTION, SOLUTION INTRAVENOUS at 10:20

## 2023-11-12 RX ADMIN — FENTANYL CITRATE 50 MCG: 50 INJECTION INTRAMUSCULAR; INTRAVENOUS at 08:03

## 2023-11-12 RX ADMIN — METRONIDAZOLE: 500 INJECTION, SOLUTION INTRAVENOUS at 07:59

## 2023-11-12 RX ADMIN — ROCURONIUM BROMIDE 10 MG: 10 INJECTION, SOLUTION INTRAVENOUS at 09:21

## 2023-11-12 RX ADMIN — SODIUM CHLORIDE: 0.9 INJECTION, SOLUTION INTRAVENOUS at 07:57

## 2023-11-12 RX ADMIN — LIDOCAINE HYDROCHLORIDE 80 MG: 20 INJECTION, SOLUTION EPIDURAL; INFILTRATION; INTRACAUDAL at 08:03

## 2023-11-12 RX ADMIN — Medication 100 MG: at 08:03

## 2023-11-12 RX ADMIN — SODIUM CHLORIDE: 0.9 INJECTION, SOLUTION INTRAVENOUS at 09:27

## 2023-11-12 RX ADMIN — SODIUM CHLORIDE, SODIUM LACTATE, POTASSIUM CHLORIDE, AND CALCIUM CHLORIDE: .6; .31; .03; .02 INJECTION, SOLUTION INTRAVENOUS at 10:28

## 2023-11-12 RX ADMIN — MIDAZOLAM 2 MG: 1 INJECTION INTRAMUSCULAR; INTRAVENOUS at 07:54

## 2023-11-12 RX ADMIN — PHENYLEPHRINE HYDROCHLORIDE 100 MCG: 10 INJECTION INTRAVENOUS at 08:21

## 2023-11-12 NOTE — OP NOTE
Chief Complaint   Patient presents with   [facility] ED f/u for chest pain and leg weakness     jmcma       Subjective:           Problem List Items Addressed This Visit        Endocrine    Diabetic polyneuropathy associated with type 2 diabetes mellitus ([facility] [address] ) continue medications ADA diet    Relevant Orders    Comprehensive metabolic panel    Hemoglobin A1C    Microalbumin / creatinine urine ratio       Cardiovascular and Mediastinum    Atherosclerosis of native artery of both lower extremities (HCC) improve mobility exercise at home discussed continue medications    Relevant Orders    Comprehensive metabolic panel    Hemoglobin A1C    Microalbumin / creatinine urine ratio       Nervous and Auditory    Multiple sclerosis (HCC)    Relevant Orders    Comprehensive metabolic panel       Other    Cancer of right breast, stage 1 ([facility] [address] ) stable follow-up Hematology Oncology mammogram pending left breast    Abnormal gait - Primary instructed on pelvic  strengthening standing exercises        Patient wants to do PT/exercise at home   t formal outpatient at this time      Orders Placed This Encounter   Procedures    Comprehensive metabolic panel     This is a patient instruction: Patient fasting for 8 hours or longer recommended  Standing Status:   Future     Standing Expiration Date:   7/18/2020    Hemoglobin A1C     Standing Status:   Future     Standing Expiration Date:   7/18/2020    Microalbumin / creatinine urine ratio       Patient Instructions   Continue medications  Neurology follow up   Annual eye exam  Cardiology follow up  Skin care  Off loading to prevent pressure sores  Hematology oncology surveillance  Foot care,   Stay current with vaccinations     BMI Counseling: Body mass index is 33 55 kg/m²  Discussed the patient's BMI with her  The BMI is above average  BMI counseling and education was provided to the patient   Nutrition recommendations include moderation in carbohydrate intake, increasing intake of OPERATIVE REPORT  PATIENT NAME: Bronson Maynard    :  1960  MRN: [id number]  Pt Location: AN OR ROOM 04    SURGERY DATE: 2023    Surgeon(s) and Role:     * Erika Ruiz MD - Primary     * Esha Gillis MD - Assisting    Preop Diagnosis:  Intussusception of colon ([address]) [K56.1] with bowel obstruction    Post-Op Diagnosis Codes:     * Intussusception of colon ([address]) [K56.1]  Ventral hernia x2, incisional hernia x1, liver cyst    Procedure(s):  LAPAROTOMY OPEN W/ RIGHT COLON RESECTION  Repair of ventral hernia x2, repair of incisional hernia x1, excision of right lateral hepatic liver cyst  Tap block-transverse abdominal muscular plane neurologic block    Specimen(s):  ID Type Source Tests Collected by Time Destination   1 : Right Hemicolectomy Tissue Colon TISSUE EXAM Erika Ruiz MD 2023  9:23 AM    2 : Liver Cyst Tissue Liver TISSUE EXAM Erika Ruiz MD 2023  9:30 AM        Estimated Blood Loss:   50 mL    Drains:  Urethral Catheter Latex 16 Fr. (Active)   Number of days: 0       Anesthesia Type:   General    Operative Indications:  Intussusception of colon ([address]) [K56.1]    Independent, non-smoker, ASA 2, wound class II, BMI 34, weight 170, height 60    Liver cyst        NEURO/PSYCH   (+) Depression   (+) Headache       Nervous and Auditory   (+) Bell palsy           Complications:   None    Procedure and Technique:  Patient was identified visually and by armband. Placed in supine position. After endotracheal intubation Eldridge catheter was inserted. A thrombin pumps in place. Upper and lower Juancarlos hugger's were in place. The abdomen was prepped and draped in sterile fashion. Timeout was completed. Midline incision was made. Carried out through skin subtenons tissue. Fascia was divided. In doing so an incisional hernia was located at the umbilicus. 2 ventral hernias were located in the upper abdomen.   These 3 hernias were then connected in the form of a midline incision. Total hernia length is 10 x 4 cm. (The umbilical site measures 3 x 2 cm, the ventral hernia measures 2.5 x 4 cm, the superior ventral hernia measures 2.5 x 2 cm)    The abdomen was entered. A transverse abdominis muscular plane block was then completed using Exparel. A total of 50 cc of combination Exparel and Marcaine was utilized throughout the procedure. Self-retaining retractors were then placed. Sponges were used to pack the bowel away. The ileum and the right colon was then mobilized off of its retroperitoneal elements. A portion of the ileum was divided using a ROSSI stapling device. The transverse colon was then exposed. The omentum over the transverse colon was mobilized superiorly. It was then divided to the right of the middle colic using a ROSSI stapling device. Division of the mesentery then took place. The hepatic flexure had been taken down. Care was taken to identifying and preserving the duodenum. The middle colic vein at the hepatic flexure was controlled by suture ligatures as well as clips. The right colonic mesentery pedicle was then exposed. This was clamped and suture ligature. A side-to-side functional anastomosis was then completed using a ROSSI stapling device. TA stapling device was used to close the enterotomies. The staple line was then inverted with 3-0 PDS suture. Randolph suture was also placed. The mesentery was reperitonealized. There is a 5 cm hepatic cyst located off the peripheral aspect of the right liver lobe. This was removed using LigaSure device pathology was sent for review. There is no spillage throughout the case. The colon polyp was utilized throughout the procedure. The area was irrigated and aspirated dry. Hemostasis was assured. Gown and gloves were changed. A back table separate: Boat closure device was utilized.     1.  Vicryl sutures used in interrupted fashion this was then followed by running #1 PDS suture tied at the lean protein and reducing intake of cholesterol  Jose Luis Maldonado    Chief Complaint   Patient presents with   [facility] ED f/u for chest pain and leg weakness     jmcma     HPI John Frazier is in followup ER for weakness he has a history of MS with residual left lower extremity weakness utilizing wheelchair for transfer  History of breast cancer hypertension diabetic neuropathy, AFib, on sotalol now normal sinus rhythm last EF 60%, history of P 80 PAD last LDL 67  /68   Pulse 97   Temp 98 2 °F (36 8 °C)   Resp 16   Ht 5' 6" (1 676 m)   SpO2 96%   BMI 33 55 kg/m²       Allergies   Allergen Reactions    Clindamycin      Annotation - [id number]: bad taste in mouth    Coumadin [Warfarin]      Reaction Date: 22May2012;  Annotation - [id number]: lip swelling    Latex      Annotation - [id number]: RASH    Pradaxa [Dabigatran Etexilate Mesylate]        Current Outpatient Medications on File Prior to Visit   Medication Sig Dispense Refill    amLODIPine (NORVASC) 10 mg tablet TAKE 1 TABLET (10 MG TOTAL) BY MOUTH DAILY 90 tablet 1    ascorbic acid ([employer]S VITAMIN C) 500 MG tablet Take 1 tablet by mouth daily      aspirin 81 MG tablet Take 162 mg by mouth daily      [place] MICROLET LANCETS lancets by Other route 2 (two) times a day E11 9 Test blood sugar twice daily 100 each 5    glucose blood (FRANKI CONTOUR NEXT TEST) test strip 1 each by Other route 2 (two) times a day E11 9 test blood sugar twice daily 100 each 5    hydrochlorothiazide (HYDRODIURIL) 12 5 mg tablet Take 1 tablet (12 5 mg total) by mouth daily 30 tablet 0    latanoprost (XALATAN) 0 005 % ophthalmic solution Administer 1 drop to both eyes daily at bedtime       lisinopril (ZESTRIL) 20 mg tablet Take 1 tablet (20 mg total) by mouth 2 (two) times a day 180 tablet 3    metFORMIN (GLUCOPHAGE) 1000 MG tablet Take 0 5 tablets (500 mg total) by mouth 2 (two) times a day with meals 90 tablet 3    Multiple Vitamin (MULTI-VITAMIN DAILY PO) Take by mouth daily midline. Clips were applied at the midline skin. Sterile dressings were applied. The patient tolerated this procedure well. Sponge instrument count correct x2. I was present for the entire procedure.     Patient Disposition:  PACU     Pathology to be determined        SIGNATURE: Nicolette Rey MD  DATE: November 12, 2023  TIME: 10:42 AM  Omega-3 Fatty Acids (OMEGA-3 FISH OIL) 1000 MG CAPS Take 1 capsule by mouth daily      simvastatin (ZOCOR) 20 mg tablet Take 1 tablet (20 mg total) by mouth daily 90 tablet 3    sotalol (BETAPACE) 80 mg tablet Take 80 mg by mouth 2 (two) times a day      spironolactone (ALDACTONE) 25 mg tablet Take 0 5 tablets (12 5 mg total) by mouth daily 30 tablet 0     No current facility-administered medications on file prior to visit  Past Medical History:   Diagnosis Date    Abscess of buttock, right     last assessed-5/4/2017    Cancer [facility])     of upper-outer quadrant of female breast right-last assessed-10/8/2015    Cellulitis of chest wall     last assessed-7/27/2015    Chronic endometritis 10/12/2006    Diabetes mellitus ([facility] )     Dysuria 11/02/2007    Flushing     resolved-6/30/2015    Glaucoma     Hyperlipidemia     Hypertension     Ingrown nail 01/05/2012    Malignant neoplasm of breast ([facility] )     last assessed-11/5/2015    MS (multiple sclerosis) ([facility] )     Nonvenomous insect bite     last assessed-12/12/2013    Palpitations 02/09/2011    Pressure ulcer of buttock 10/13/2006    Proctitis 05/12/2006    Vaginal polyp 03/14/2006    Viral gastroenteritis     last assessed-9/8/2016       Past Surgical History:   Procedure Laterality Date    BREAST BIOPSY      open    BREAST SURGERY      CERVICAL BIOPSY  W/ LOOP ELECTRODE EXCISION      DILATION AND CURETTAGE OF UTERUS      INCISION AND DRAINAGE OF WOUND Left 2/27/2017    Procedure: INCISION AND DRAINAGE (I&D)   Chest wall x 2;  Surgeon: Maurilio Hayes MD;  Location: [address] [place] [address];  Service:     MASTECTOMY      last assessed-6/30/2015          reports that she has never smoked  She has never used smokeless tobacco  She reports that she drank alcohol  She reports that she has current or past drug history  reports that she has never smoked   She has never used smokeless tobacco         Review of Systems   Constitutional: Positive for fatigue  Negative for activity change and fever  HENT: Negative for congestion and nosebleeds  Respiratory: Negative for chest tightness and shortness of breath  Cardiovascular: Negative for chest pain  Gastrointestinal: Negative for abdominal distention and blood in stool  Genitourinary: Negative for difficulty urinating, dysuria, hematuria and vaginal discharge  Musculoskeletal: Positive for arthralgias  Neurological: Positive for weakness  Negative for tremors, seizures and speech difficulty  MS LE weakness Wheelchair and transfer issues,'   Hematological: Negative for adenopathy  Psychiatric/Behavioral: Negative for agitation, confusion and hallucinations  Physical Exam   Constitutional: She is oriented to person, place, and time  She appears well-developed  HENT:   Head: Normocephalic and atraumatic  Mouth/Throat: Oropharynx is clear and moist    Eyes: Pupils are equal, round, and reactive to light  Conjunctivae and EOM are normal  No scleral icterus  Neck: No JVD present  No thyromegaly present  Cardiovascular: Normal rate and regular rhythm  No murmur heard  Pulmonary/Chest: Effort normal and breath sounds normal  No stridor  Abdominal: Soft  She exhibits no distension  There is no guarding  Musculoskeletal: She exhibits edema  She exhibits no deformity  robert LE trace ankles   Lymphadenopathy:     She has no cervical adenopathy  Neurological: She is alert and oriented to person, place, and time  She displays abnormal reflex  No cranial nerve deficit  She exhibits abnormal muscle tone  LE L>R weakness   Skin: Skin is warm  Capillary refill takes less than 2 seconds  No erythema  Multiple ecchymosis L arm     AK seb k chest breast line  Psychiatric: She has a normal mood and affect   Her behavior is normal  Judgment and thought content normal

## 2023-11-12 NOTE — ED CARE HANDOFF
Emergency Department Sign Out Note        Sign out and transfer of care from Dr Kwabena Vega. See Separate Emergency Department note. The patient, Maddie Mensah, was evaluated by the previous provider for abd pain and bloody stools. Workup Completed:  CBC  CMP  Lipase  CT AP    ED Course / Workup Pending (followup):  Surgery recommendations                                  ED Course as of 11/11/23 2242   Sat Nov 11, 2023   2120 SO from Dr Kwabena Vega, 61-year-old female presenting due to abdominal pain for 2 weeks worse in the last 2 days bright red blood per rectum which turned darker, CT shows intussusception and likely cancerous mass. Currently pending surgery consult and recommendations at this time. Patient vitals and pain stable   2233 Pt evaluated by surgery and admitted to service for surgery tomorrow. Appreciate recommendations and update from Dr Shweta Cazares. Procedures  Medical Decision Making  Amount and/or Complexity of Data Reviewed  Labs: ordered. Radiology: ordered. Risk  OTC drugs. Prescription drug management. Decision regarding hospitalization. Disposition  Final diagnoses:   Abdominal mass     Time reflects when diagnosis was documented in both MDM as applicable and the Disposition within this note       Time User Action Codes Description Comment    11/11/2023  9:10 PM Tiffanie Sosa Add [R19.00] Abdominal mass     11/11/2023 10:12 PM Esdras Wright Add [K56.1] Intussusception of colon [facility])           ED Disposition       ED Disposition   Admit    Condition   Stable    Date/Time   Sat Nov 11, 2023 10:38 PM    Comment   Case was discussed with Dr Shweta Cazares and the patient's admission status was agreed to be Admission Status: inpatient status to the service of Dr. Frankey Mallard . Follow-up Information    None       Patient's Medications   Discharge Prescriptions    No medications on file     No discharge procedures on file.        ED Provider  Electronically Signed by     Nichol Douglas MD  11/11/23 2243       Nichol Douglas MD  11/11/23 8048

## 2023-11-12 NOTE — UTILIZATION REVIEW
Initial Clinical Review    Admission: Date/Time/Statement:   Admission Orders (From admission, onward)       Ordered        11/11/23 2227  Inpatient Admission  Once                          Orders Placed This Encounter   Procedures    Inpatient Admission     Standing Status:   Standing     Number of Occurrences:   1     Order Specific Question:   Level of Care     Answer:   Med Surg [16]     Order Specific Question:   Bed request comments     Answer:   Please send patient to [address] [place] [address] 4th floor. Order Specific Question:   Estimated length of stay     Answer:   More than 2 Midnights     Order Specific Question:   Certification     Answer:   I certify that inpatient services are medically necessary for this patient for a duration of greater than two midnights. See H&P and MD Progress Notes for additional information about the patient's course of treatment. ED Arrival Information       Expected   -    Arrival   11/11/2023 16:22    Acuity   Urgent              Means of arrival   Walk-In    Escorted by   -    Service   Surgery-General    Admission type   Emergency              Arrival complaint   AB PAIN AND SOB             Chief Complaint   Patient presents with    Abdominal Pain     Patient presents for upper abdominal pain causing her to have shallow breathing. Patient states she did have an episode of black stools and states there was blood when she wiped. This has been going on for a week. Initial Presentation: 59 y.o. female with PMHx of cholecystectomy, 1993 tubal ligation, who presents with epigastric/LUQ abdominal pain. Started 2 weeks ago. Describes pain as aching, nicolas, on/off, worsening, diarrhea for last 2 days. Plan: Inpatient admission for evaluation and treatment of intussusception of cecum/TI into ascending colon secondary to 5.1 x 2.7 cm mass along the cecum: NPO, I V fluids, IV ceftriaxone and flagyl, follow up CEA, plan for color resection.      Date: 11/12   Day 2:     Surgery: NPO for right colon resection today, IV fluids, IV ceftriaxone and flagyl.      Procedure(s):  LAPAROTOMY OPEN W/ RIGHT COLON RESECTION  Repair of ventral hernia x2, repair of incisional hernia x1, excision of right lateral hepatic liver cyst  Tap block-transverse abdominal muscular plane neurologic block    ED Triage Vitals   Temperature Pulse Respirations Blood Pressure SpO2   11/11/23 1634 11/11/23 1636 11/11/23 1636 11/11/23 1636 11/11/23 1636   98.4 °F (36.9 °C) 74 20 149/73 97 %      Temp Source Heart Rate Source Patient Position - Orthostatic VS BP Location FiO2 (%)   11/11/23 1634 11/11/23 1636 11/11/23 1636 11/11/23 1636 --   Oral Monitor Lying Left arm       Pain Score       11/11/23 1705       10 - Worst Possible Pain          Wt Readings from Last 1 Encounters:   11/11/23 76 kg (167 lb 8.8 oz)     Additional Vital Signs:     Date/Time Temp Pulse Resp BP MAP (mmHg) SpO2 Calculated FIO2 (%) - Nasal Cannula O2 Flow Rate (L/min) Nasal Cannula O2 Flow Rate (L/min) O2 Device   11/12/23 13:38:14 99.1 °F (37.3 °C) 80 17 129/73 92 96 % -- -- -- --   11/12/23 12:27:30 99.6 °F (37.6 °C) 76 17 117/67 84 96 % -- -- -- --   11/12/23 12:00:11 98.4 °F (36.9 °C) 70 17 112/61 78 95 % -- -- -- --   11/12/23 11:36:01 99.3 °F (37.4 °C) 81 17 101/59 73 93 % -- -- -- --   11/12/23 1115 98.9 °F (37.2 °C) 72 20 104/63 -- 94 % 32 -- 3 L/min Nasal cannula   11/12/23 1100 98.9 °F (37.2 °C) 73 20 104/57 -- 95 % -- 5 L/min -- Simple mask   11/12/23 1050 98.9 °F (37.2 °C) 76 20 109/54 -- 93 % -- 5 L/min -- Simple mask   11/12/23 1040 99.8 °F (37.7 °C) 78 20 103/51 -- 93 % -- 5 L/min -- Simple mask   11/12/23 0745 98 °F (36.7 °C) 74 20 138/74 -- 98 % -- -- -- None (Room air)   11/12/23 06:53:08 98.5 °F (36.9 °C) 71 18 126/69 88 93 % -- -- -- --   11/12/23 00:21:48 98.3 °F (36.8 °C) 65 17 127/72 90 97 % -- -- -- --   11/12/23 0010 98.9 °F (37.2 °C) 70 16 130/80 -- 99 % -- -- -- None (Room air)   11/11/23 2100 -- 77 16 140/70 -- 98 % -- -- -- None (Room air)   11/11/23 2030 -- 69 16 134/61 88 97 % -- -- -- None (Room air)   11/11/23 2000 -- 69 16 129/60 86 98 % -- -- -- None (Room air)   11/11/23 1930 -- 69 16 127/63 89 98 % -- -- -- None (Room air)   11/11/23 1900 -- 78 16 168/79 113 98 % -- -- -- None (Room air)   11/11/23 1800 -- 67 16 120/56 80 99 % -- -- -- None (Room air)   11/11/23 1730 -- 70 16 122/56 81 99 % -- -- -- None (Room air)   11/11/23 1715 -- 70 18 124/57 83 99 % -- -- -- None (Room air)   11/11/23 1700 -- -- -- -- -- -- -- -- -- None (Room air)   11/11/23 1636 -- 74 20 149/73 -- 97 % -- -- -- None (Room air)     Pertinent Labs/Diagnostic Test Results:   CT abdomen pelvis with contrast   Final Result by Rico Alves MD (11/11 2049)      1) Intussusception of the cecum and terminal ileum into the ascending colon secondary to a 5.1 x 2.7 cm soft tissue density, highly suspicious for an underlying mass. Inflammation in the terminal ileum and cecum acting as a lead point would be unusual in    an adult patient. 2) No findings to suggest obstruction at this time. Fluid is seen in the ascending colon. 3) Several adjacent prominent mesenteric lymph nodes, which may be reactive, although metastatic disease is not entirely excluded. 4) No evidence of metastatic disease elsewhere in the abdomen or pelvis. 5) Mild circumferential wall thickening of the urinary bladder, which may be related to cystitis. Recommend correlation with urinalysis. 6) Additional findings as above.       I personally discussed this study with Darrick Rizvi on 11/11/2023 at 8:47 PM.                     Workstation performed: [id number]               Results from last 7 days   Lab Units 11/12/23  0435 11/11/23  2238 11/11/23  1700   WBC Thousand/uL 8.94  --  11.01*   HEMOGLOBIN g/dL 10.4*  --  11.2*   HEMATOCRIT % 32.4*  --  35.6   PLATELETS Thousands/uL 293 283 320   NEUTROS ABS Thousands/µL 5.11  --  6.48         Results from last 7 days   Lab Units 11/12/23  0435 11/11/23  1700   SODIUM mmol/L 139 139   POTASSIUM mmol/L 3.7 3.7   CHLORIDE mmol/L 106 107   CO2 mmol/L 28 26   ANION GAP mmol/L 5 6   BUN mg/dL 12 12   CREATININE mg/dL 0.56* 0.73   EGFR ml/min/1.73sq m 100 88   CALCIUM mg/dL 8.1* 8.8   MAGNESIUM mg/dL 2.2  --    PHOSPHORUS mg/dL 3.1  --      Results from last 7 days   Lab Units 11/11/23  1700   AST U/L 16   ALT U/L 14   ALK PHOS U/L 67   TOTAL PROTEIN g/dL 7.4   ALBUMIN g/dL 3.9   TOTAL BILIRUBIN mg/dL 0.33         Results from last 7 days   Lab Units 11/12/23  0435 11/11/23  1700   GLUCOSE RANDOM mg/dL 94 93           Results from last 7 days   Lab Units 11/11/23  1700   LIPASE u/L 40         Results from last 7 days   Lab Units 11/11/23  2321   CEA ng/mL 0.9         ED Treatment:   Medication Administration from 11/11/2023 1622 to 11/12/2023 0020         Date/Time Order Dose Route Action     11/11/2023 1705 EST HYDROmorphone (DILAUDID) injection 0.5 mg 0.5 mg Intravenous Given     11/11/2023 1705 EST Famotidine (PF) (PEPCID) injection 20 mg 20 mg Intravenous Given     11/11/2023 1705 EST aluminum-magnesium hydroxide-simethicone (MAALOX) oral suspension 30 mL 30 mL Oral Given     11/11/2023 1855 EST iohexol (OMNIPAQUE) 350 MG/ML injection (MULTI-DOSE) 100 mL 100 mL Intravenous Given     11/11/2023 2023 EST HYDROmorphone (DILAUDID) injection 0.5 mg 0.5 mg Intravenous Given     11/11/2023 2239 EST lactated ringers infusion 125 mL/hr Intravenous New Bag     11/11/2023 2239 EST ceftriaxone (ROCEPHIN) 1 g/50 mL in dextrose IVPB 1,000 mg Intravenous New Bag     11/11/2023 2319 EST metroNIDAZOLE (FLAGYL) IVPB (premix) 500 mg 100 mL 500 mg Intravenous New Bag          Past Medical History:   Diagnosis Date    Abdominal pain     "burning pain" came to the ED    Renal disorder     Wears dentures     permanent upper denture    Weight loss     20 lb wt loss in 2-3 weeks d/t stomach issues     Present on Admission:  **None**      Admitting Diagnosis: Intussusception of colon ([address]) [K56.1]  Abdominal mass [R19.00]  Age/Sex: 59 y.o. female  Admission Orders:  Scheduled Medications:  acetaminophen, 975 mg, Oral, Q6H AJ  enoxaparin, 40 mg, Subcutaneous, Q24H  gabapentin, 100 mg, Oral, 4x Daily      Continuous IV Infusions:  HYDROmorphone, , Intravenous, Continuous  lactated ringers, 125 mL/hr, Intravenous, Continuous      PRN Meds:  diphenhydrAMINE, 25 mg, Intravenous, Q6H PRN  lactated ringers, 1,000 mL, Intravenous, Once PRN   And  lactated ringers, 1,000 mL, Intravenous, Once PRN  methocarbamol, 500 mg, Oral, Q6H PRN  naloxone, 0.1 mg, Intravenous, Q1MIN PRN  ondansetron, 4 mg, Intravenous, Q4H PRN  sodium chloride, 1,000 mL, Intravenous, Once PRN   And  sodium chloride, 1,000 mL, Intravenous, Once PRN        None    Network Utilization Review Department  ATTENTION: Please call with any questions or concerns to [phone] and carefully listen to the prompts so that you are directed to the right person. All voicemails are confidential.   For Discharge needs, contact Care Management DC Support Team at [phone] opt. 2  Send all requests for admission clinical reviews, approved or denied determinations and any other requests to dedicated fax number below belonging to the campus where the patient is receiving treatment.  List of dedicated fax numbers for the Facilities:  [place] DENIALS (Administrative/Medical Necessity) [phone]   DISCHARGE SUPPORT TEAM (NETWORK) [phone] [facility] (Maternity/NICU/Pediatrics) [phone]   [address] [place] [address] [phone]   [facility] [address]  [facility] [phone]

## 2023-11-12 NOTE — ANESTHESIA POSTPROCEDURE EVALUATION
Post-Op Assessment Note    CV Status:  Stable  Pain Score: 0    Pain management: adequate     Mental Status:  Arousable   Hydration Status:  Euvolemic   PONV Controlled:  Controlled   Airway Patency:  Patent  Airway: intubated      Post Op Vitals Reviewed: Yes      Staff: CRNA         No notable events documented.     BP   103/51   Temp      Pulse  80   Resp   17   SpO2   94

## 2023-11-12 NOTE — H&P
H&P - General Surgery  Clay Rosado 59 y.o. female MRN: [id number]  Unit/Bed#: ED-16 Encounter: [id number]      Assessment:  63F with intussusception of cecum/TI into ascending colon secondary to 5.1 x 2.7 cm mass along the cecum. Plan:  - admit to gen surg  - DUNIA  - [email or web address]  - ctx/flagyl  - f/u CEA  - R colon resection 11/12    HPI:  Clay Rosado is a 59 y.o. female with PMHx of cholecystectomy, 1993 tubal ligation, who presents with epigastric/LUQ abdominal pain. Started 2 weeks ago. Describes pain as aching, nicolas, on/off, worsening. Never occurred before. No family history of colorectal cancer or IBD. Last colonoscopy in 3/2020 showed normal colon/cecum. Denies fever/chills, SOB, n/v. Last ate at 2pm. Last BM at 2pm today, diarrhea for last 2 days. VSS on RA. Results:  Recent Labs     11/11/23  1700   WBC 11.01*   HGB 11.2*      SODIUM 139   K 3.7      CO2 26   BUN 12   CREATININE 0.73   GLUC 93   CALCIUM 8.8   AST 16   ALT 14   ALKPHOS 67   TBILI 0.33   LIPASE 40     11/11/23 CT ap:  Intussusception of cecum and terminal ileum into ascending colon secondary to a 5.1 x 2.7 cm soft tissue density, highly suspicious for an underlying mass. No obstruction. Fluid seen in the ascending colon. Several adjacent prominent mesenteric lymph nodes, which may be reactive, although metastatic disease is not entirely excluded. No mets elsewhere in the abdomen or pelvis. Appendix is partially involved in the intussusception, but no appendicitis. 7.7 x 5.5 cm simple cyst exophytic to the tip of the liver, mildly enlarged from 6.2 x 4.5 cm in February 2020. Mild circumferential wall thickening of the urinary bladder, which may be related to cystitis. Physical Exam  Constitutional:       General: She is not in acute distress. HENT:      Head: Normocephalic and atraumatic. Eyes:      Extraocular Movements: Extraocular movements intact.       Conjunctiva/sclera: Conjunctivae normal. Cardiovascular:      Rate and Rhythm: Normal rate. Pulses: Normal pulses. Pulmonary:      Effort: Pulmonary effort is normal. No respiratory distress. Abdominal:      General: There is no distension. Palpations: Abdomen is soft. Tenderness: There is abdominal tenderness (LLQ). Musculoskeletal:         General: Normal range of motion. Cervical back: Normal range of motion. Skin:     General: Skin is warm and dry. Neurological:      General: No focal deficit present. Mental Status: She is alert and oriented to person, place, and time. Psychiatric:         Mood and Affect: Mood normal.         Review of Systems   Constitutional:  Negative for chills and fever. HENT:  Negative for ear pain and sore throat. Eyes:  Negative for pain and visual disturbance. Respiratory:  Negative for cough and shortness of breath. Cardiovascular:  Negative for chest pain and palpitations. Gastrointestinal:  Positive for abdominal distention, abdominal pain (epigastric/LUQ) and diarrhea. Negative for constipation, nausea and vomiting. Genitourinary:  Negative for dysuria and hematuria. Musculoskeletal:  Negative for arthralgias and back pain. Skin:  Negative for color change and rash. Neurological:  Negative for seizures and syncope. All other systems reviewed and are negative.       Historical Information   Past Medical History:   Diagnosis Date    Abdominal pain     "burning pain" came to the ED    Renal disorder     Wears dentures     permanent upper denture    Weight loss     20 lb wt loss in 2-3 weeks d/t stomach issues     Past Surgical History:   Procedure Laterality Date    CHOLECYSTECTOMY      TUBAL LIGATION      at 29      Social History   Social History     Substance and Sexual Activity   Alcohol Use Not Currently     Social History     Substance and Sexual Activity   Drug Use Never     Social History     Tobacco Use   Smoking Status Never   Smokeless Tobacco Never Tobacco Comments    social        Meds/Allergies   all current active meds have been reviewed  No Known Allergies    Objective   First Vitals:   Blood Pressure: 149/73 (11/11/23 1636)  Pulse: 74 (11/11/23 1636)  Temperature: 98.4 °F (36.9 °C) (11/11/23 1634)  Temp Source: Oral (11/11/23 1634)  Respirations: 20 (11/11/23 1636)  Height: 4' 11" (149.9 cm) (11/11/23 1730)  Weight - Scale: 76 kg (167 lb 8.8 oz) (11/11/23 1730)  SpO2: 97 % (11/11/23 1636)    Current Vitals:   Blood Pressure: 129/60 (11/11/23 2000)  Pulse: 69 (11/11/23 2000)  Temperature: 98.4 °F (36.9 °C) (11/11/23 1634)  Temp Source: Oral (11/11/23 1634)  Respirations: 16 (11/11/23 2000)  Height: 4' 11" (149.9 cm) (11/11/23 1730)  Weight - Scale: 76 kg (167 lb 8.8 oz) (11/11/23 1730)  SpO2: 98 % (11/11/23 2000)    No intake or output data in the 24 hours ending 11/11/23 2116    Invasive Devices       Peripheral Intravenous Line  Duration             Peripheral IV 11/11/23 Proximal;Right;Ventral (anterior) Forearm <1 day                    Lab Results: I have personally reviewed pertinent lab results. Imaging: I have personally reviewed pertinent reports. EKG, Pathology, and Other Studies: I have personally reviewed pertinent reports. Counseling / Coordination of Care  Total floor / unit time spent today 30 minutes. Greater than 50% of total time was spent with the patient and / or family counseling and / or coordination of care.

## 2023-11-12 NOTE — QUICK NOTE
Post Op Check:    59 y.o. F now POD 0 s/p right hemicolectomy    The patient denied any nausea, vomiting, chest pain, shortness of breath, fevers, chills. Currently on 3L NC saturating greater than 92%. No bowel function at this point in time. Voiding into [place] without difficulty. Was instructed on how to use PCA. Endorses minimal abdominal pain. Vitals:    11/12/23 1338   BP: 129/73   Pulse: 80   Resp: 17   Temp: 99.1 °F (37.3 °C)   SpO2: 96%       General: NAD  HENT: NCAT MMM  Neck: supple, no JVD  CV: nl rate  Lungs: nl wob. No resp distress  ABD: Soft, appropriately tender to palpation, mildly distended. Dressing is clean and dry.   Extrem: No CCE  Neuro: AAOx3     Plan:  Diet NPO; Sips of clear liquids  [email or web address]  PCA for pain control  PRN anti nausea meds  DVT prophylaxis  Encourage IS  Monitor I and Os closely    Abad Mclean MD  General Surgery Resident

## 2023-11-12 NOTE — PLAN OF CARE
Problem: PAIN - ADULT  Goal: Verbalizes/displays adequate comfort level or baseline comfort level  Description: Interventions:  - Encourage patient to monitor pain and request assistance  - Assess pain using appropriate pain scale  - Administer analgesics based on type and severity of pain and evaluate response  - Implement non-pharmacological measures as appropriate and evaluate response  - Consider cultural and social influences on pain and pain management  - Notify physician/advanced practitioner if interventions unsuccessful or patient reports new pain  Outcome: Progressing     Problem: INFECTION - ADULT  Goal: Absence or prevention of progression during hospitalization  Description: INTERVENTIONS:  - Assess and monitor for signs and symptoms of infection  - Monitor lab/diagnostic results  - Monitor all insertion sites, i.e. indwelling lines, tubes, and drains  - Monitor endotracheal if appropriate and nasal secretions for changes in amount and color  - [place] appropriate cooling/warming therapies per order  - Administer medications as ordered  - Instruct and encourage patient and family to use good hand hygiene technique  - Identify and instruct in appropriate isolation precautions for identified infection/condition  Outcome: Progressing  Goal: Absence of fever/infection during neutropenic period  Description: INTERVENTIONS:  - Monitor WBC    Outcome: Progressing     Problem: SAFETY ADULT  Goal: Patient will remain free of falls  Description: INTERVENTIONS:  - Educate patient/family on patient safety including physical limitations  - Instruct patient to call for assistance with activity   - Consult OT/PT to assist with strengthening/mobility   - Keep Call bell within reach  - Keep bed low and locked with side rails adjusted as appropriate  - Keep care items and personal belongings within reach  - Initiate and maintain comfort rounds  - Make Fall Risk Sign visible to staff  - Apply yellow socks and bracelet for high fall risk patients  - Consider moving patient to room near nurses station  Outcome: Progressing  Goal: Maintain or return to baseline ADL function  Description: INTERVENTIONS:  -  Assess patient's ability to carry out ADLs; assess patient's baseline for ADL function and identify physical deficits which impact ability to perform ADLs (bathing, care of mouth/teeth, toileting, grooming, dressing, etc.)  - Assess/evaluate cause of self-care deficits   - Assess range of motion  - Assess patient's mobility; develop plan if impaired  - Assess patient's need for assistive devices and provide as appropriate  - Encourage maximum independence but intervene and supervise when necessary  - Involve family in performance of ADLs  - Assess for home care needs following discharge   - Consider OT consult to assist with ADL evaluation and planning for discharge  - Provide patient education as appropriate  Outcome: Progressing  Goal: Maintains/Returns to pre admission functional level  Description: INTERVENTIONS:  - Perform BMAT or MOVE assessment daily.   - Set and communicate daily mobility goal to care team and patient/family/caregiver.    - Collaborate with rehabilitation services on mobility goals if consulted  - Out of bed for toileting  - Record patient progress and toleration of activity level   Outcome: Progressing     Problem: DISCHARGE PLANNING  Goal: Discharge to home or other facility with appropriate resources  Description: INTERVENTIONS:  - Identify barriers to discharge w/patient and caregiver  - Arrange for needed discharge resources and transportation as appropriate  - Identify discharge learning needs (meds, wound care, etc.)  - Arrange for interpretive services to assist at discharge as needed  - Refer to Case Management Department for coordinating discharge planning if the patient needs post-hospital services based on physician/advanced practitioner order or complex needs related to functional status, cognitive ability, or social support system  Outcome: Progressing     Problem: Knowledge Deficit  Goal: Patient/family/caregiver demonstrates understanding of disease process, treatment plan, medications, and discharge instructions  Description: Complete learning assessment and assess knowledge base.   Interventions:  - Provide teaching at level of understanding  - Provide teaching via preferred learning methods  Outcome: Progressing

## 2023-11-12 NOTE — PROGRESS NOTES
Progress Note  Norma Posadas 59 y.o. female MRN: [id number]  Unit/Bed#: OR POOL Encounter: [id number]    Assessment  63F with intussusception of cecum/TI into ascending colon secondary to 5.1 x 2.7 cm mass along the cecum. Plan  - NPO for R colon resection today  - [email or web address]  - ctx/flagyl  - DVT ppx    Subjective/Objective   No acute overnight events. Pain controlled. No n/v.    VSS on RA. /69   Pulse 71   Temp 98.5 °F (36.9 °C)   Resp 18   Ht 4' 11" (1.499 m)   Wt 76 kg (167 lb 8.8 oz)   LMP  (LMP Unknown)   SpO2 93%   BMI 33.84 kg/m²      Physical Exam:  General: NAD  HENT: NCAT  CV: nl rate  Lungs: nl wob. No resp distress.   ABD: Soft, ND, NT  Extrem: No CCE  Neuro: alert & oriented    I/O's unrecorded    11/11 CEA normal  Recent Labs     11/11/23  1700 11/11/23  2238 11/12/23  0435   WBC 11.01*  --  8.94   HGB 11.2*  --  10.4*    283 293   SODIUM 139  --  139   K 3.7  --  3.7     --  106   CO2 26  --  28   BUN 12  --  12   CREATININE 0.73  --  0.56*   GLUC 93  --  94   CALCIUM 8.8  --  8.1*   MG  --   --  2.2   PHOS  --   --  3.1   AST 16  --   --    ALT 14  --   --    ALKPHOS 67  --   --    TBILI 0.33  --   --

## 2023-11-12 NOTE — ANESTHESIA PREPROCEDURE EVALUATION
Procedure:  LAPAROTOMY OPEN W/ RIGHT COLON RESECTION (Abdomen)    Relevant Problems   GI/HEPATIC   (+) Liver cyst      NEURO/PSYCH   (+) Depression   (+) Headache      Nervous and Auditory   (+) Bell palsy        Physical Exam    Airway    Mallampati score: II  TM Distance: >3 FB  Neck ROM: full     Dental       Cardiovascular      Pulmonary      Other Findings        Anesthesia Plan  ASA Score- 2     Anesthesia Type- general with ASA Monitors. Additional Monitors:     Airway Plan: ETT. Comment: Possible TAP Block for post op pain. .       Plan Factors-    Chart reviewed. Induction- intravenous. Postoperative Plan-     Informed Consent- Anesthetic plan and risks discussed with patient. I personally reviewed this patient with the CRNA. Discussed and agreed on the Anesthesia Plan with the CRNA. Uriel Juares

## 2023-11-12 NOTE — ED ATTENDING ATTESTATION
11/11/2023  Nicole Murray DO, saw and evaluated the patient. I have discussed the patient with the resident/non-physician practitioner and agree with the resident's/non-physician practitioner's findings, Plan of Care, and MDM as documented in the resident's/non-physician practitioner's note, except where noted. All available labs and Radiology studies were reviewed. I was present for key portions of any procedure(s) performed by the resident/non-physician practitioner and I was immediately available to provide assistance. At this point I agree with the current assessment done in the Emergency Department. I have conducted an independent evaluation of this patient a history and physical is as follows:    Patient was received in signout from outgoing ED attending at approximately 2100. At the time of signout, patient is pending general surgery consultation. After surgery evaluation, patient accepted onto their service for further care and management, likely will need operative intervention during hospital stay.   Stable at the time of disposition    ED Course         Critical Care Time  Procedures

## 2023-11-13 PROBLEM — K56.1 INTUSSUSCEPTION (HCC): Status: ACTIVE | Noted: 2023-11-13

## 2023-11-13 LAB
ANION GAP SERPL CALCULATED.3IONS-SCNC: 5 MMOL/L
BASOPHILS # BLD AUTO: 0.02 THOUSANDS/ÂΜL (ref 0–0.1)
BASOPHILS NFR BLD AUTO: 0 % (ref 0–1)
BUN SERPL-MCNC: 7 MG/DL (ref 5–25)
CALCIUM SERPL-MCNC: 7.9 MG/DL (ref 8.4–10.2)
CHLORIDE SERPL-SCNC: 109 MMOL/L (ref 96–108)
CO2 SERPL-SCNC: 26 MMOL/L (ref 21–32)
CREAT SERPL-MCNC: 0.49 MG/DL (ref 0.6–1.3)
EOSINOPHIL # BLD AUTO: 0 THOUSAND/ÂΜL (ref 0–0.61)
EOSINOPHIL NFR BLD AUTO: 0 % (ref 0–6)
ERYTHROCYTE [DISTWIDTH] IN BLOOD BY AUTOMATED COUNT: 14.1 % (ref 11.6–15.1)
GFR SERPL CREATININE-BSD FRML MDRD: 104 ML/MIN/1.73SQ M
GLUCOSE SERPL-MCNC: 107 MG/DL (ref 65–140)
HCT VFR BLD AUTO: 29.6 % (ref 34.8–46.1)
HGB BLD-MCNC: 9.4 G/DL (ref 11.5–15.4)
IMM GRANULOCYTES # BLD AUTO: 0.06 THOUSAND/UL (ref 0–0.2)
IMM GRANULOCYTES NFR BLD AUTO: 0 % (ref 0–2)
LYMPHOCYTES # BLD AUTO: 1.85 THOUSANDS/ÂΜL (ref 0.6–4.47)
LYMPHOCYTES NFR BLD AUTO: 13 % (ref 14–44)
MAGNESIUM SERPL-MCNC: 2 MG/DL (ref 1.9–2.7)
MCH RBC QN AUTO: 28.5 PG (ref 26.8–34.3)
MCHC RBC AUTO-ENTMCNC: 31.8 G/DL (ref 31.4–37.4)
MCV RBC AUTO: 90 FL (ref 82–98)
MONOCYTES # BLD AUTO: 0.81 THOUSAND/ÂΜL (ref 0.17–1.22)
MONOCYTES NFR BLD AUTO: 6 % (ref 4–12)
NEUTROPHILS # BLD AUTO: 11.61 THOUSANDS/ÂΜL (ref 1.85–7.62)
NEUTS SEG NFR BLD AUTO: 81 % (ref 43–75)
NRBC BLD AUTO-RTO: 0 /100 WBCS
PHOSPHATE SERPL-MCNC: 2.8 MG/DL (ref 2.3–4.1)
PLATELET # BLD AUTO: 267 THOUSANDS/UL (ref 149–390)
PMV BLD AUTO: 9.8 FL (ref 8.9–12.7)
POTASSIUM SERPL-SCNC: 3.6 MMOL/L (ref 3.5–5.3)
RBC # BLD AUTO: 3.3 MILLION/UL (ref 3.81–5.12)
SODIUM SERPL-SCNC: 140 MMOL/L (ref 135–147)
WBC # BLD AUTO: 14.35 THOUSAND/UL (ref 4.31–10.16)

## 2023-11-13 PROCEDURE — 99232 SBSQ HOSP IP/OBS MODERATE 35: CPT | Performed by: SURGERY

## 2023-11-13 PROCEDURE — 85025 COMPLETE CBC W/AUTO DIFF WBC: CPT | Performed by: SURGERY

## 2023-11-13 PROCEDURE — 97163 PT EVAL HIGH COMPLEX 45 MIN: CPT

## 2023-11-13 PROCEDURE — 80048 BASIC METABOLIC PNL TOTAL CA: CPT | Performed by: SURGERY

## 2023-11-13 PROCEDURE — 83735 ASSAY OF MAGNESIUM: CPT | Performed by: SURGERY

## 2023-11-13 PROCEDURE — 84100 ASSAY OF PHOSPHORUS: CPT | Performed by: SURGERY

## 2023-11-13 PROCEDURE — 94760 N-INVAS EAR/PLS OXIMETRY 1: CPT

## 2023-11-13 RX ORDER — DEXTROSE MONOHYDRATE, SODIUM CHLORIDE, AND POTASSIUM CHLORIDE 50; 1.49; 4.5 G/1000ML; G/1000ML; G/1000ML
50 INJECTION, SOLUTION INTRAVENOUS CONTINUOUS
Status: DISCONTINUED | OUTPATIENT
Start: 2023-11-13 | End: 2023-11-14

## 2023-11-13 RX ADMIN — GABAPENTIN 100 MG: 100 CAPSULE ORAL at 08:19

## 2023-11-13 RX ADMIN — SODIUM CHLORIDE, SODIUM LACTATE, POTASSIUM CHLORIDE, AND CALCIUM CHLORIDE 125 ML/HR: .6; .31; .03; .02 INJECTION, SOLUTION INTRAVENOUS at 02:33

## 2023-11-13 RX ADMIN — ACETAMINOPHEN 975 MG: 325 TABLET, FILM COATED ORAL at 23:13

## 2023-11-13 RX ADMIN — ACETAMINOPHEN 975 MG: 325 TABLET, FILM COATED ORAL at 00:07

## 2023-11-13 RX ADMIN — ENOXAPARIN SODIUM 40 MG: 40 INJECTION SUBCUTANEOUS at 14:20

## 2023-11-13 RX ADMIN — METHOCARBAMOL TABLETS 500 MG: 500 TABLET, COATED ORAL at 14:21

## 2023-11-13 RX ADMIN — GABAPENTIN 100 MG: 100 CAPSULE ORAL at 17:42

## 2023-11-13 RX ADMIN — ACETAMINOPHEN 975 MG: 325 TABLET, FILM COATED ORAL at 17:43

## 2023-11-13 RX ADMIN — DEXTROSE, SODIUM CHLORIDE, AND POTASSIUM CHLORIDE 50 ML/HR: 5; .45; .15 INJECTION INTRAVENOUS at 09:33

## 2023-11-13 RX ADMIN — ACETAMINOPHEN 975 MG: 325 TABLET, FILM COATED ORAL at 05:45

## 2023-11-13 RX ADMIN — POTASSIUM PHOSPHATE, MONOBASIC AND POTASSIUM PHOSPHATE, DIBASIC 21 MMOL: 224; 236 INJECTION, SOLUTION, CONCENTRATE INTRAVENOUS at 11:14

## 2023-11-13 RX ADMIN — GABAPENTIN 100 MG: 100 CAPSULE ORAL at 11:19

## 2023-11-13 RX ADMIN — GABAPENTIN 100 MG: 100 CAPSULE ORAL at 23:13

## 2023-11-13 NOTE — PROGRESS NOTES
Progress Note - General Surgery   [facility] Resident on Surgery Service   Beth Harvey 59 y.o. female MRN: [id number]  Unit/Bed#: S -[place] Encounter: [id number]    Assessment:  59 y.o. F now s/p right hemicolectomy on 11/12     Afebrile. VSS.  UOP 3225 cc  WBC 14.3 from 8.9  Hgb 9.4 from 10.4  Cr 0.49 from 0.56    Plan:  Advance to clear liquid diet  Transition to MIVF  Discontinue whitmore catheter  Keep PCA  Have patient out of bed and ambulating  Encourage IS  PT/OT evaluation  DVT prophylaxis  PRN anti nausea meds    Subjective/Objective     Subjective: No acute events overnight. Patient endorses minimal abdominal pain. Denies having nausea, vomiting, fevers, chills, chest pain, shortness of breath. Tolerating sips. No bowel movements and no flatus at this point time. Voiding into [place]. Minimal use of PCA. Objective:     Blood pressure 136/69, pulse 80, temperature 99 °F (37.2 °C), resp. rate 16, height 4' 11" (1.499 m), weight 76 kg (167 lb 8.8 oz), SpO2 96 %, not currently breastfeeding. ,Body mass index is 33.84 kg/m². Intake/Output Summary (Last 24 hours) at 11/13/2023 0915  Last data filed at 11/13/2023 0857  Gross per 24 hour   Intake 5599.55 ml   Output 3665 ml   Net 1934.55 ml       Invasive Devices       Peripheral Intravenous Line  Duration             Peripheral IV 11/11/23 Proximal;Right;Ventral (anterior) Forearm 1 day    Peripheral IV 11/12/23 Dorsal (posterior); Left Wrist 1 day                    General: NAD  HENT: NCAT MMM  Neck: supple, no JVD  CV: nl rate  Lungs: nl wob. No resp distress  ABD: Soft, appropriately tender, nondistended. Midline dressing is clean/dry/intact.   Extrem: No CCE  Neuro: AAOx3       Scheduled Meds:  Current Facility-Administered Medications   Medication Dose Route Frequency Provider Last Rate    acetaminophen  975 mg Oral Q6H [address] Chaz Laughlin MD      dextrose 5 % and sodium chloride 0.45 % with KCl 20 mEq/L  50 mL/hr Intravenous Continuous Katerin Schuler PA-C      diphenhydrAMINE  25 mg Intravenous Q6H PRN Jonel Fields MD      enoxaparin  40 mg Subcutaneous Q24H Juan Mckenzie MD      gabapentin  100 mg Oral 4x Daily Joenl Fields MD      HYDROmorphone   Intravenous Continuous Jonel Fields MD      lactated ringers  1,000 mL Intravenous Once PRN Jonel Fields MD      And    lactated ringers  1,000 mL Intravenous Once PRN Jonel Fields MD      methocarbamol  500 mg Oral Q6H PRN Jonel Fields MD      naloxone  0.1 mg Intravenous Q1MIN PRN Jonel Fields MD      ondansetron  4 mg Intravenous Q4H PRN Jonel Fields MD      potassium phosphate  21 mmol Intravenous Once Kristene Angelucci, MD      sodium chloride  1,000 mL Intravenous Once PRN Jonel Fields MD      And    sodium chloride  1,000 mL Intravenous Once PRN Jonel Fields MD       Continuous Infusions:dextrose 5 % and sodium chloride 0.45 % with KCl 20 mEq/L, 50 mL/hr  HYDROmorphone,       PRN Meds:.  diphenhydrAMINE    lactated ringers **AND** lactated ringers    methocarbamol    naloxone    ondansetron    sodium chloride **AND** sodium chloride      Lab, Imaging and other studies:I have personally reviewed pertinent lab results.     VTE Pharmacologic Prophylaxis: Enoxaparin (Lovenox)  VTE Mechanical Prophylaxis: sequential compression device      Renato Guerrier MD  11/13/2023 9:15 AM

## 2023-11-13 NOTE — PLAN OF CARE
Problem: GASTROINTESTINAL - ADULT  Goal: Minimal or absence of nausea and/or vomiting  Description: INTERVENTIONS:  - Administer IV fluids if ordered to ensure adequate hydration  - Maintain NPO status until nausea and vomiting are resolved  - Administer ordered antiemetic medications as needed  - Provide nonpharmacologic comfort measures as appropriate  - Advance diet as tolerated, if ordered  - Consider nutrition services referral to assist patient with adequate nutrition and appropriate food choices  Outcome: Progressing     Problem: GASTROINTESTINAL - ADULT  Goal: Maintains or returns to baseline bowel function  Description: INTERVENTIONS:  - Assess bowel function  - Encourage oral fluids to ensure adequate hydration  - Administer IV fluids if ordered to ensure adequate hydration  - Administer ordered medications as needed  - Encourage mobilization and activity    Outcome: Progressing     Problem: GASTROINTESTINAL - ADULT  Goal: Maintains adequate nutritional intake  Description: INTERVENTIONS:  - Monitor I&O, weight, and lab values if indicated  - Obtain nutrition services referral as needed  Outcome: Progressing

## 2023-11-13 NOTE — PLAN OF CARE
Problem: PHYSICAL THERAPY ADULT  Goal: Performs mobility at highest level of function for planned discharge setting. See evaluation for individualized goals. Description: Treatment/Interventions: Functional transfer training, LE strengthening/ROM, Therapeutic exercise, Endurance training, Patient/family training, Equipment eval/education, Gait training, Bed mobility, Elevations, Spoke to nursing  Equipment Recommended: (S) Walker (Short rolling walker, however anticipate patient will make mobility progress by the time of medical discharge--we will continue to monitor)       See flowsheet documentation for full assessment, interventions and recommendations. Note: Prognosis: Good  Problem List: Decreased endurance, Impaired balance, Decreased mobility, Obesity, Pain (Gait deviations, limited posture)  Assessment: Pt is a 59 y.o. female seen for PT evaluation s/p admit to [facility] on 11/11/2023 w/ Intussusception ([address] Pt had LAPAROTOMY OPEN W/ RIGHT COLON RESECTION, Repair of ventral hernia x2, repair of incisional hernia x1, excision of right lateral hepatic liver cyst, Tap block-transverse abdominal muscular plane neurologic block and is POD 1. Order placed for PT. Prior to admission: Pt lives with spouse and other family in a two-story home and was very independent without any DME for mobility. She worked more than 40 hours a week, no falls in the last 6 months. Upon evaluation: Pt needed only intermittent assistance for preparing for sit to stand transfers, and intermittent assist for ambulation with a rolling walker within the room and in the halls. She needed verbal instruction for technique with transfers and ambulation. Pt's clinical presentation is currently unstable/unpredictable given the functional mobility deficits above, especially (but not limited to) gait deviations and pain, and combined with medical complications including abnormal H&H, abnormal WBCs, and fear/retreat. Pt IS NOT at his/her mobility baseline. She is at risk for falls based on obesity and slow gait speed . During this admission, pt would benefit from continued skilled inpatient PT in the acute care setting in order to address deficits as defined above to maximize function and mobility. Recommendations:     From a PT standpoint, recommend next several sessions focus on continued mobility trials with a short rolling walker with better height adjustment, progressing to L RAD as able, eventual stair training and bed mobility with minimizing twisting for improvements in pain control. Barriers to Discharge: Inaccessible home environment (Steps to enter and steps inside the home, including no first-floor bathroom.)     Rehab Resource Intensity Level, PT: II (Moderate Resource Intensity) (Currently, but anticipate patient will progress to level 3 minimum resource intensity by time of medical optimization with improvements in pain and mobility)    See flowsheet documentation for full assessment.

## 2023-11-13 NOTE — DISCHARGE INSTR - AVS FIRST PAGE
Soft diet-fish, chicken, pasta. See clinical references                    please avoid steak, salads, raw vegetables, pork chops for 4 weeks. It is important to maintain soft stools. Please add Benefiber to your diet. In addition to drinking at least 3 glasses of water or juice daily. Please call the office when you leave to schedule an appointment for 2 weeks. Please call [phone]. [address], [address], [place], [address]. Off of [address] between [facility] and [facility]. Please note that the office is not located at the [employer]. Activity:    May lift 10 lb as many times as desired the 1st week,       20 lb in 2 weeks,       30 lb in 3 weeks. Walking is encouraged  Normal daily activities including climbing steps are okay  Do not engage in strenuous activity ( sit-ups or crutches) or contact sports for 4-6 weeks post-operatively    Return to Work:   Okay to return to work when you feel well if you desire. Diet:   See above    Wound Care: It is okay to shower. Wash incision gently with soap and water and pat dry. Do not soak incisions in bath water or swim for two weeks. Do not apply any creams or ointments. Pain Medication:   Please take as directed if needed. May use Advil or Motrin in addition. Recall, the pain medicine and anesthesia is associated with constipation. No driving while taking narcotic pain medications. Other: It is normal to developed a “healing ridge” / firm incision after surgery. This is your body making scar tissue. It is a good sign  Constipation is very common after general anesthesia. Continue with Benefiber and fluids  It is normal to get bruising after surgery. If you have questions after discharge please call the office.     If you have increased pain, fever >101.5, increased drainage, redness or a bad smell at your surgery site, please call us immediately or come directly to the Emergency Room    Alicia henry deb con un medico para que la quiten las grapas en 2 semanas. Actividad  No levante mas de 10-15 libras maranda 2 semanas. No levante mas de 30 libras maranda al menos 6 semanas. Se puede caminar y hacer josse [address] [facility]. No participe en actividades extenuantes ni deportes de contacto maranda 4-6 semanas despues de la operacion. Volver al Onel Mary:   Esta nella volver al Onel Mary cuando se sienta nella si lo desea. Dieta:   Puede volver a lucas dieta saludable normal.    Cuidado de heridas:  Lucas herida esta cerrada con puntadas y pegamento. Se puede darrel. [address] suavamente con agua y [place] y que las seque. No sumerja las incisiones en agua ni nade maranda 2 semanas. No aplique unguentos o cremas a las incisiones. Medicamentos:   Por favor tome las medicinas anelgesicas toby se indica lai solo si sea necesario. Se puede usar Advil o Motrin tambien. Las medicinas anelgisicas y la anesthesia estan associadas con la constipacion. No conduzce mientras sonny tomando medicinas anelgesica. Otras cosas:  Es normal tener "henry cresta de curacion" o que la incision se pone dura despues de la [place]. [place] es tu cuerpo haciendo un cicatriz. Es henry buena senal. Constipacion es muy comun despues de anesthesia general. Por favor, use "Milk of magnesia" toby necesario para prevenir constipacion. Es normal magullar despues de [place]. Si tiene alguna pregunta despues de que te vayas, se puede llamar a la oficina, [phone]. Si tiene OSMAN Meier no sonny controlado por las medicinas anelgesica, si tiene fievre, mas drenaje, o lucas incision se pone jayce o huele mal, por favor que nos llame inmediatamente o vengas directamente a urgencia.

## 2023-11-13 NOTE — PHYSICAL THERAPY NOTE
PHYSICAL THERAPY EVALUATION  NAME:  Nicole Hancock  DATE: 11/13/23    AGE:   59 y.o. Mrn:   [id number]  Principal problem: Principal Problem:    Intussusception ([address])      Vitals:    11/13/23 0709 11/13/23 0722 11/13/23 1145 11/13/23 1206   BP: 136/69      BP Location:       Pulse: 80  74    Resp: 16      Temp: 99 °F (37.2 °C)      TempSrc:       SpO2: 97% 96% 96% 96%   Weight:       Height:           Length Of Stay: 2  Performed at least 2 patient identifiers during session: Name and Birthday  PHYSICAL THERAPY EVALUATION :    11/13/23 1135   PT Last Visit   PT Visit Date 11/13/23   Note Type   Note type Evaluation   Pain Assessment   Pain Assessment Tool 0-10   Pain Score 3   Pain Location/Orientation Location: Abdomen   Effect of Pain on Daily Activities Limits speed and independence of mobility, especially transitional movements   Patient's Stated Pain Goal No pain   Hospital Pain Intervention(s) Repositioned; Ambulation/increased activity; Emotional support;Splinting  (Patient using PCA pump when needed.)   Restrictions/Precautions   Weight Bearing Precautions Per Order No   Other Precautions Pain;O2;Multiple lines  (Capnography--spoke to RT who reports patient can be removed from standing during ambulation trial.  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 )   General   Additional Pertinent History LAPAROTOMY OPEN W/ RIGHT COLON RESECTION, Repair of ventral hernia x2, repair of incisional hernia x1, excision of right lateral hepatic liver cyst, Tap block-transverse abdominal muscular plane neurologic block   Family/Caregiver Present Yes  (daughter--patient reports that daughter may perform translation for her and declined Caviar translation)   Cognition   Overall Cognitive Status WFL   Arousal/Participation Alert   Following Commands Follows one step commands with increased time or repetition  (English commands, more consistent with Andorran)   Subjective   Subjective Patient pleasant and cooperative. Daughter states that patient is apprehensive about ambulation and mobility due to fear of pain. Denies lightheadedness with upright change position   RUE Assessment   RUE Assessment WFL   LUE Assessment   LUE Assessment WFL   Strength RLE   R Hip Flexion   (tested to 3, limited overpressure due to pain)   R Knee Extension   (tested to 3, limited overpressure due to pain)   R Ankle Dorsiflexion 4+/5   Strength LLE   L Hip Flexion   (tested to 3, limited overpressure due to pain)   L Knee Extension   (tested to 3, limited overpressure due to pain)   L Ankle Dorsiflexion 4+/5   Light Touch   RLE Light Touch Grossly intact   LLE Light Touch Grossly intact   Bed Mobility   Supine to Sit   (As patient out of bed in recliner upon entering room. Patient and daughter reports that patient only required assist of 1 for out of bed mobility to the recliner)   Transfers   Sit to Stand 4  Minimal assistance  (Min assist for sitting upright from back to recliner chair, otherwise patient at supervision level for sit to stand)   Additional items Assist x 1; Increased time required;Verbal cues;Armrests   Stand to Sit 5  Supervision   Additional items Assist x 1; Increased time required;Verbal cues;Armrests   Ambulation/Elevation   Gait pattern Excessively slow; Step through pattern  (Increased walker advancement, however walker at higher height than indicated) Gait Assistance 4  Minimal assist  (for <25% of gait trial, otherwise supervision.)   Additional items Assist x 1;Verbal cues  (Daughter present providing assistance for line management)   Assistive Device Rolling walker  (Marcelo height)   Distance 79'   Stair Management Assistance Not tested   Balance   Static Sitting Good   Static Standing Fair -   Ambulatory Fair -   Higher level balance   (Pain with attempts at upright standing tolerance)   Endurance Deficit   Endurance Deficit Yes   Endurance Deficit Description   (Limited ambulation distance and standing tolerance. Self-reported fatigue)   Activity Tolerance   Activity Tolerance Patient limited by fatigue;Patient limited by pain   Nurse Made Aware Spoke to SEDRICK Pollard before session and Rakel Knight post session     Assessment:   Pt is a 59 y.o. female seen for PT evaluation s/p admit to [facility] on 11/11/2023 w/ Intussusception ([address] Pt had LAPAROTOMY OPEN W/ RIGHT COLON RESECTION, Repair of ventral hernia x2, repair of incisional hernia x1, excision of right lateral hepatic liver cyst, Tap block-transverse abdominal muscular plane neurologic block and is POD 1. Order placed for PT. Prior to admission: Pt lives with spouse and other family in a two-story home and was very independent without any DME for mobility. She worked more than 40 hours a week, no falls in the last 6 months. Upon evaluation: Pt needed only intermittent assistance for preparing for sit to stand transfers, and intermittent assist for ambulation with a rolling walker within the room and in the halls. She needed verbal instruction for technique with transfers and ambulation. Pt's clinical presentation is currently unstable/unpredictable given the functional mobility deficits above, especially (but not limited to) gait deviations and pain, and combined with medical complications including abnormal H&H, abnormal WBCs, and fear/retreat.   Pt IS NOT at his/her mobility baseline. She is at risk for falls based on obesity and slow gait speed. During this admission, pt would benefit from continued skilled inpatient PT in the acute care setting in order to address deficits as defined above to maximize function and mobility. Recommendations:    From a PT standpoint, recommend next several sessions focus on continued mobility trials with a short rolling walker with better height adjustment, progressing to L RAD as able, eventual stair training and bed mobility with minimizing twisting for improvements in pain control. Instructed patient and daughter to perform more frequent gait trials as opposed to 1 long distance for ambulation throughout the day   Prognosis Good   Problem List Decreased endurance; Impaired balance;Decreased mobility;Obesity;Pain  (Gait deviations, limited posture)   Barriers to Discharge Inaccessible home environment  (Steps to enter and steps inside the home, including no first-floor bathroom.)   Goals   Patient Goals To have less pain and to walk. STG Expiration Date 11/23/23   Short Term Goal #1 Pt will: Perform rolling  and supine<>sit bed mobility tasks with Supervision to prepare for transfers and reposition in bed. Perform transfers with Supervision to improve ease of transfers. Perform ambulation with LRAD as needed for up to 150' with Supervision to improve gait quality. Perform up to 1 flight of stairs w/ railing +/- DME and w/no more than min A to return to home with SHARMIN and return to [facility] home. PT Treatment Day 0   Plan   Treatment/Interventions Functional transfer training;LE strengthening/ROM; Therapeutic exercise; Endurance training;Patient/family training;Equipment eval/education;Gait training;Bed mobility;Elevations; Spoke to nursing   PT Frequency 3-5x/wk   Discharge Recommendation   Rehab Resource Intensity Level, PT II (Moderate Resource Intensity)  (Currently, but anticipate patient will progress to level 3 minimum resource intensity by time of medical optimization with improvements in pain and mobility)   Equipment Recommended (S)  Walker  (Short rolling walker, however anticipate patient will make mobility progress by the time of medical discharge--we will continue to monitor)   Additional Comments Nursing Recommendations:    Mobility Plan as of 11/13/23: Pt is one Assist with RW to/from recliner, BSC, and bathroom. Additional Comments 2 Co-morbidities affecting pt's physical performance at time of assessment include but are not limited to: Abdominal pain, Renal disorder, obesity. Personal factors affecting pt at time of IE include: steps to enter environment, multi-level environment, inability to perform current job functions, inability to perform IADLs, and inability to navigate community distances. AM-PAC Basic Mobility Inpatient   Turning in Flat Bed Without Bedrails 2   Lying on Back to Sitting on Edge of Flat Bed Without Bedrails 2   Moving Bed to Chair 2   Standing Up From Chair Using Arms 3   Walk in Room 3   Climb 3-5 Stairs With Railing 1   Basic Mobility Inpatient Raw Score 13   Basic Mobility Standardized Score 33.99   Highest Level Of Mobility   JH-HLM Goal 4: Move to chair/commode   JH-HLM Achieved 7: Walk 25 feet or more   End of Consult   Patient Position at End of Consult All needs within reach; Bedside chair  (Back on capnography)   (Please find full objective findings from PT assessment regarding body systems outlined above). The patient's AM-PAC Basic Mobility Inpatient Short Form Raw Score is 13.  A Raw score of less than or equal to 16 suggests the patient may benefit from discharge to post-acute rehabilitation services, which MAY coincide with CURRENT above PT recommendations, however anticipate patient will make mobility progress by the time of medical optimization and improvements in pain control    However please refer to therapist recommendation for discharge planning given other factors that may influence destination. Adapted from [facility] of -PAC “6-Clicks” Basic Mobility and Daily Activity Scores With Discharge Destination. Physical Therapy, 2021;101:1-9. DOI: 10.1093/ptj/[email or web address]    Portions of the record may have been created with voice recognition software. Occasional wrong word or "sound a like" substitutions may have occurred due to the inherent limitations of voice recognition software.   Read the chart carefully and recognize, using context, where substitutions have occurred

## 2023-11-13 NOTE — PROGRESS NOTES
Progress Note  Carmen Ferguson 59 y.o. female MRN: [id number]  Unit/Bed#: S -01 Encounter: [id number]    Assessment  63F s/p R hemicolectomy for intussusception. Plan  - advance to softs  - d/c fluids  - d/c PCA, transition to PO pain meds  - IS, OOB/ambulate  - DVT ppx  - PT: II (moderate resource intensity), but anticipate pt will progress to III (minimum) by discharge    Subjective/Objective   No acute overnight events. Reports RUQ abdominal pain. Tolerating CLD well. No n/v. No BM, passing flatus. Voiding, walking. VSS on RA. /69 (BP Location: Left arm)   Pulse 66   Temp 99.6 °F (37.6 °C) (Oral)   Resp 16   Ht 4' 11" (1.499 m)   Wt 76 kg (167 lb 8.8 oz)   LMP  (LMP Unknown)   SpO2 94%   BMI 33.84 kg/m²      Physical Exam:  General: NAD  HENT: NCAT  CV: nl rate  Lungs: nl wob. No resp distress. ABD: Soft, ND, R sided tenderness. Incisions CDI. Extrem: No CCE  Neuro: alert & oriented    UOP: 450cc + x3 unrecorded    Recent Labs     11/11/23  1700 11/11/23  2238 11/12/23  0435 11/13/23  0425 11/14/23  0552   WBC 11.01*  --  8.94 14.35* 8.98   HGB 11.2*  --  10.4* 9.4* 9.4*      < > 293 267 275   SODIUM 139  --  139 140 137   K 3.7  --  3.7 3.6 3.6     --  106 109* 107   CO2 26  --  28 26 27   BUN 12  --  12 7 7   CREATININE 0.73  --  0.56* 0.49* 0.50*   GLUC 93  --  94 107 93   CALCIUM 8.8  --  8.1* 7.9* 7.7*   MG  --   --  2.2 2.0  --    PHOS  --   --  3.1 2.8  --    AST 16  --   --   --   --    ALT 14  --   --   --   --    ALKPHOS 67  --   --   --   --    TBILI 0.33  --   --   --   --     < > = values in this interval not displayed.

## 2023-11-13 NOTE — QUICK NOTE
Donna Davenport was seen at bedside at approximately 12:26 this afternoon with family present. Patient was educated on use of incentive spirometry, including the importance of consistency of use in addition to ambulation for recovery. Patient was instructed on proper spirometer use, taking a slow inhale on the spirometer, keeping the indicator in the target area, and the target volume of 1500 with myself and the nurse. Patient was educated on frequency of spirometry use of 10x per hour, or when watching TV during each new commercial. Patient and daughter verbalized understanding of instructions, and questions were answered. Patient was able to pull approximately 1250 on the spirometer at this time.      NAEYLI Peace  [facility]

## 2023-11-14 LAB
ANION GAP SERPL CALCULATED.3IONS-SCNC: 3 MMOL/L
BASOPHILS # BLD AUTO: 0.02 THOUSANDS/ÂΜL (ref 0–0.1)
BASOPHILS NFR BLD AUTO: 0 % (ref 0–1)
BUN SERPL-MCNC: 7 MG/DL (ref 5–25)
CALCIUM SERPL-MCNC: 7.7 MG/DL (ref 8.4–10.2)
CHLORIDE SERPL-SCNC: 107 MMOL/L (ref 96–108)
CO2 SERPL-SCNC: 27 MMOL/L (ref 21–32)
CREAT SERPL-MCNC: 0.5 MG/DL (ref 0.6–1.3)
EOSINOPHIL # BLD AUTO: 0.05 THOUSAND/ÂΜL (ref 0–0.61)
EOSINOPHIL NFR BLD AUTO: 1 % (ref 0–6)
ERYTHROCYTE [DISTWIDTH] IN BLOOD BY AUTOMATED COUNT: 14.5 % (ref 11.6–15.1)
GFR SERPL CREATININE-BSD FRML MDRD: 104 ML/MIN/1.73SQ M
GLUCOSE SERPL-MCNC: 93 MG/DL (ref 65–140)
HCT VFR BLD AUTO: 30.8 % (ref 34.8–46.1)
HGB BLD-MCNC: 9.4 G/DL (ref 11.5–15.4)
IMM GRANULOCYTES # BLD AUTO: 0.04 THOUSAND/UL (ref 0–0.2)
IMM GRANULOCYTES NFR BLD AUTO: 0 % (ref 0–2)
LYMPHOCYTES # BLD AUTO: 2.64 THOUSANDS/ÂΜL (ref 0.6–4.47)
LYMPHOCYTES NFR BLD AUTO: 29 % (ref 14–44)
MCH RBC QN AUTO: 28.2 PG (ref 26.8–34.3)
MCHC RBC AUTO-ENTMCNC: 30.5 G/DL (ref 31.4–37.4)
MCV RBC AUTO: 93 FL (ref 82–98)
MONOCYTES # BLD AUTO: 0.75 THOUSAND/ÂΜL (ref 0.17–1.22)
MONOCYTES NFR BLD AUTO: 8 % (ref 4–12)
NEUTROPHILS # BLD AUTO: 5.48 THOUSANDS/ÂΜL (ref 1.85–7.62)
NEUTS SEG NFR BLD AUTO: 62 % (ref 43–75)
NRBC BLD AUTO-RTO: 0 /100 WBCS
PLATELET # BLD AUTO: 275 THOUSANDS/UL (ref 149–390)
PMV BLD AUTO: 10.1 FL (ref 8.9–12.7)
POTASSIUM SERPL-SCNC: 3.6 MMOL/L (ref 3.5–5.3)
RBC # BLD AUTO: 3.33 MILLION/UL (ref 3.81–5.12)
SODIUM SERPL-SCNC: 137 MMOL/L (ref 135–147)
WBC # BLD AUTO: 8.98 THOUSAND/UL (ref 4.31–10.16)

## 2023-11-14 PROCEDURE — 97116 GAIT TRAINING THERAPY: CPT

## 2023-11-14 PROCEDURE — 97535 SELF CARE MNGMENT TRAINING: CPT

## 2023-11-14 PROCEDURE — 99232 SBSQ HOSP IP/OBS MODERATE 35: CPT | Performed by: SURGERY

## 2023-11-14 PROCEDURE — 97166 OT EVAL MOD COMPLEX 45 MIN: CPT

## 2023-11-14 PROCEDURE — 85025 COMPLETE CBC W/AUTO DIFF WBC: CPT

## 2023-11-14 PROCEDURE — 94760 N-INVAS EAR/PLS OXIMETRY 1: CPT

## 2023-11-14 PROCEDURE — 97110 THERAPEUTIC EXERCISES: CPT

## 2023-11-14 PROCEDURE — 80048 BASIC METABOLIC PNL TOTAL CA: CPT

## 2023-11-14 RX ORDER — POTASSIUM CHLORIDE 20 MEQ/1
40 TABLET, EXTENDED RELEASE ORAL ONCE
Status: COMPLETED | OUTPATIENT
Start: 2023-11-14 | End: 2023-11-14

## 2023-11-14 RX ORDER — OXYCODONE HYDROCHLORIDE 5 MG/1
5 TABLET ORAL EVERY 4 HOURS PRN
Status: DISCONTINUED | OUTPATIENT
Start: 2023-11-14 | End: 2023-11-16 | Stop reason: HOSPADM

## 2023-11-14 RX ORDER — CALCIUM CARBONATE 500 MG/1
1000 TABLET, CHEWABLE ORAL ONCE
Status: COMPLETED | OUTPATIENT
Start: 2023-11-14 | End: 2023-11-14

## 2023-11-14 RX ORDER — HYDROMORPHONE HCL IN WATER/PF 6 MG/30 ML
0.2 PATIENT CONTROLLED ANALGESIA SYRINGE INTRAVENOUS
Status: DISCONTINUED | OUTPATIENT
Start: 2023-11-14 | End: 2023-11-16 | Stop reason: HOSPADM

## 2023-11-14 RX ORDER — METHOCARBAMOL 500 MG/1
500 TABLET, FILM COATED ORAL EVERY 6 HOURS SCHEDULED
Status: DISCONTINUED | OUTPATIENT
Start: 2023-11-14 | End: 2023-11-16 | Stop reason: HOSPADM

## 2023-11-14 RX ADMIN — GABAPENTIN 100 MG: 100 CAPSULE ORAL at 10:53

## 2023-11-14 RX ADMIN — METHOCARBAMOL TABLETS 500 MG: 500 TABLET, COATED ORAL at 12:19

## 2023-11-14 RX ADMIN — METHOCARBAMOL TABLETS 500 MG: 500 TABLET, COATED ORAL at 19:35

## 2023-11-14 RX ADMIN — GABAPENTIN 100 MG: 100 CAPSULE ORAL at 12:18

## 2023-11-14 RX ADMIN — Medication 2.5 MG: at 10:53

## 2023-11-14 RX ADMIN — ACETAMINOPHEN 975 MG: 325 TABLET, FILM COATED ORAL at 19:35

## 2023-11-14 RX ADMIN — ACETAMINOPHEN 975 MG: 325 TABLET, FILM COATED ORAL at 05:05

## 2023-11-14 RX ADMIN — CALCIUM CARBONATE (ANTACID) CHEW TAB 500 MG 1000 MG: 500 CHEW TAB at 10:53

## 2023-11-14 RX ADMIN — ENOXAPARIN SODIUM 40 MG: 40 INJECTION SUBCUTANEOUS at 12:19

## 2023-11-14 RX ADMIN — GABAPENTIN 100 MG: 100 CAPSULE ORAL at 19:35

## 2023-11-14 RX ADMIN — Medication 2.5 MG: at 20:56

## 2023-11-14 RX ADMIN — GABAPENTIN 100 MG: 100 CAPSULE ORAL at 21:01

## 2023-11-14 RX ADMIN — POTASSIUM CHLORIDE 40 MEQ: 1500 TABLET, EXTENDED RELEASE ORAL at 10:53

## 2023-11-14 RX ADMIN — ACETAMINOPHEN 975 MG: 325 TABLET, FILM COATED ORAL at 12:18

## 2023-11-14 NOTE — PLAN OF CARE
Problem: PAIN - ADULT  Goal: Verbalizes/displays adequate comfort level or baseline comfort level  Description: Interventions:  - Encourage patient to monitor pain and request assistance  - Assess pain using appropriate pain scale  - Administer analgesics based on type and severity of pain and evaluate response  - Implement non-pharmacological measures as appropriate and evaluate response  - Consider cultural and social influences on pain and pain management  - Notify physician/advanced practitioner if interventions unsuccessful or patient reports new pain  Outcome: Progressing     Problem: INFECTION - ADULT  Goal: Absence or prevention of progression during hospitalization  Description: INTERVENTIONS:  - Assess and monitor for signs and symptoms of infection  - Monitor lab/diagnostic results  - Monitor all insertion sites, i.e. indwelling lines, tubes, and drains  - Monitor endotracheal if appropriate and nasal secretions for changes in amount and color  - [place] appropriate cooling/warming therapies per order  - Administer medications as ordered  - Instruct and encourage patient and family to use good hand hygiene technique  - Identify and instruct in appropriate isolation precautions for identified infection/condition  Outcome: Progressing  Goal: Absence of fever/infection during neutropenic period  Description: INTERVENTIONS:  - Monitor WBC    Outcome: Progressing     Problem: DISCHARGE PLANNING  Goal: Discharge to home or other facility with appropriate resources  Description: INTERVENTIONS:  - Identify barriers to discharge w/patient and caregiver  - Arrange for needed discharge resources and transportation as appropriate  - Identify discharge learning needs (meds, wound care, etc.)  - Arrange for interpretive services to assist at discharge as needed  - Refer to Case Management Department for coordinating discharge planning if the patient needs post-hospital services based on physician/advanced practitioner order or complex needs related to functional status, cognitive ability, or social support system  Outcome: Progressing     Problem: Knowledge Deficit  Goal: Patient/family/caregiver demonstrates understanding of disease process, treatment plan, medications, and discharge instructions  Description: Complete learning assessment and assess knowledge base.   Interventions:  - Provide teaching at level of understanding  - Provide teaching via preferred learning methods  Outcome: Progressing

## 2023-11-14 NOTE — OCCUPATIONAL THERAPY NOTE
Occupational Therapy Evaluation      Kenia Vergara    11/14/2023    Principal Problem:    Intussusception ([address])      Past Medical History:   Diagnosis Date    Abdominal pain     "burning pain" came to the ED    Renal disorder     Wears dentures     permanent upper denture    Weight loss     20 lb wt loss in 2-3 weeks d/t stomach issues       Past Surgical History:   Procedure Laterality Date    CHOLECYSTECTOMY      EXPLORATORY LAPAROTOMY W/ BOWEL RESECTION N/A 11/12/2023    Procedure: LAPAROTOMY OPEN W/ RIGHT COLON RESECTION;  Surgeon: Olu March MD;  Location: [facility];  Service: General    TUBAL LIGATION      at 36           11/14/23 0940   OT Last Visit   OT Visit Date 11/14/23   Note Type   Note type Evaluation   Pain Assessment   Pain Assessment Tool 0-10   Pain Score 3   Pain Location/Orientation Location: Abdomen   Restrictions/Precautions   Weight Bearing Precautions Per Order No   Other Precautions Pain;Multiple lines   Home Living   Type of [address] [place] [address] Two level;Stairs to enter with rails   Bathroom Shower/Tub Tub/shower unit   Bathroom Toilet Standard   Home Equipment   (no DME PTA)   Prior Function   Level of Sixes Independent with functional mobility; Independent with ADLs   Lives With Spouse  (and cousin)   Mitzy Job Help From Family   IADLs Independent with driving; Independent with meal prep; Independent with medication management   Falls in the last 6 months 0   Vocational Full time employment  (Works in a [address] 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 Pt reports will have support upon d/c. Intr[employer] Gratification Crafting- creating decorations for house.    General   Family/Caregiver Present Yes  ("my family")   ADL   Where Assessed Standing at sink   Eating Assistance 7  Independent   Grooming Assistance 5  Supervision/Setup   UB Bathing Assistance 5  Supervision/Setup   LB Bathing Assistance 4  Minimal Assistance   LB Bathing Deficit Right lower leg including foot; Left lower leg including foot  (simulated)   UB Dressing Assistance 5  Supervision/Setup   LB Dressing Assistance 4  Minimal Assistance   LB Dressing Deficit Don/doff R sock; Don/doff L sock; Thread RLE into underwear; Thread LLE into underwear   Toileting Assistance  5  Supervision/Setup   Toileting Deficit Increased time to complete   Additional Comments Did not observe UB bathing and LB bathing at time of evaluation, with use of clinical reasoning, pt's performance throughout evaluation indicates that pt may be able to perform these tasks at the levels listed above   Bed Mobility   Additional Comments unable to assess. Pt presents reclined in chair and returned to chair at the end of session. Transfers   Sit to Stand 5  Supervision   Additional items Increased time required;Verbal cues   Stand to Sit 5  Supervision   Additional items Increased time required;Verbal cues   Toilet transfer 5  Supervision   Additional items Increased time required;Verbal cues   Functional Mobility   Functional Mobility 5  Supervision   Additional Comments inc'd time to mobilize from chair to bathroom. No AD used. Pt declined need. Balance   Static Sitting Good   Dynamic Sitting Fair  (limited by abdominal discomfort)   Static Standing Fair  (supported)   Activity Tolerance   Activity Tolerance Patient limited by pain; Patient limited by fatigue   RUE Assessment   RUE Assessment WFL   LUE Assessment   LUE Assessment WFL   Vision - Complex Assessment   Acuity Able to read clock/calendar on wall without difficulty   Cognition   Overall Cognitive Status [facility]   Arousal/Participation Alert; Cooperative   Attention Within functional limits   Orientation Level Oriented X4   Memory Within functional limits   Following Commands Follows all commands and directions without difficulty   Comments Thai is pt's first language however is able to communicate adequately for this evaluation. Pt confirmed identity by stating name and . Assessment   Limitation Decreased ADL status; Decreased endurance;Decreased self-care trans;Decreased high-level ADLs  (impaired pain, balance, fxnl mobility, act serina, fxnl reach)   Prognosis Good   Assessment Pt is a 59 y.o. female seen for OT evaluation s/p admission to [address] [place] [address] on 2023  due to abdominal pain causing shallow breathing. Pt also with bloody diarrhea. Pt diagnosis: Intussusception ([address] Pt had LAPAROTOMY OPEN W/ RIGHT COLON RESECTION, Repair of ventral hernia x2, repair of incisional hernia x1, excision of right lateral hepatic liver cyst, Tap block-transverse abdominal muscular plane neurologic block and is POD 2. Pt with OT orders. Pt's PMH impacting occupational performance is listed above. Pt's PLOF also listed in above flowsheet. Pt is (I) PTA and lives with spouse and family, working greater than full time and motivated to return to [employer]. Personal and environmental factors supporting pt at time of IE include age, (I) PLOF, supportive family, and attitude towards recovery. During evaluation pt performed as is outlined above in flowsheet. Standardized assessments used to assist in identifying performance deficits include [facility] 6-Clicks. Pt's raw score on the AM-PAC Daily activity inpatient short form is 22, standardized score is 47.1, which is greater than 39.4. Patients at this level are likely to benefit from DC to home. Performance deficits that affect the pt’s occupational performance can be seen above. Due to pt's current functional limitations and medical complications, pt is functioning below baseline level of independence.  Pt would benefit from continued skilled OT treatment in order to maximize safety, independence and overall performance with ADLs, IADLs, functional mobility, and functional transfers in order to achieve highest level of function. Based on functional performance deficits and assessments, this evaluation is identified as a moderate complexity evaluation. Goals   Patient Goals Less pain, walk better, and go home. LTG Time Frame 7-10   Long Term Goal #1 GOALS:    Pt will achieve the following within specified time frame:  *Perform ADL transfers at mod (I)/(I) level for inc'd independence with ADLs/purposeful tasks    *Bed mobility- mod (I) for inc'd independence to manage own comfort and initiate EOB & OOB purposeful tasks    *Perform UB ADL at (I) level for inc'd independence with self cares    *Perform LB ADL at mod (I) level using AE prn for inc'd independence with self cares    *Increase dynamic seated balance to Good for inc'd safety with seated purposeful tasks. *Increase static stand balance to Good and dyn stand balance to Fair+ for inc'd safety with standing purposeful tasks    *Increase stand tolerance x7-10m for inc'd tolerance with standing purposeful tasks    *Perform clothing management/hygiene for toileting at (I)ly level for inc'd independence with self cares    *Perform sinkside G&H (I)ly, with good safety and Good balance for inc'd independence with self cares      Thank you  Josh Rabago   Plan   Treatment Interventions ADL retraining;Functional transfer training;UE strengthening/ROM; Patient/family training;Equipment evaluation/education; Compensatory technique education;Continued evaluation; Activityengagement   Goal Expiration Date 11/24/23   OT Frequency 2-3x/wk   Discharge Recommendation   Rehab Resource Intensity Level, OT II (Moderate Resource Intensity)   AM-PAC Daily Activity Inpatient   Lower Body Dressing 3   Bathing 3   Toileting 4   Upper Body Dressing 4   Grooming 4   Eating 4   Daily Activity Raw Score 22   Daily Activity Standardized Score (Calc for Raw Score >=11) 47. 1   AM-PAC Applied Cognition Inpatient   Following a Speech/Presentation 4  (language barrier, however WNL if in [place]) Understanding Ordinary Conversation 4  (same as above)   Taking Medications 4   Remembering Where Things Are Placed or Put Away 4   Remembering List of 4-5 Errands 4   Taking Care of Complicated Tasks 4   Applied Cognition Raw Score 24   Applied Cognition Standardized Score 62.21   Barthel Index   Feeding 10   Bathing 5   Grooming Score 5   Dressing Score 5   Bladder Score 10   Bowels Score 10   Toilet Use Score 5   Transfers (Bed/Chair) Score 10   Mobility (Level Surface) Score 10   Additional Treatment Session   Start Time 0955   End Time 1005   Treatment Assessment Pt seen at bedside for OT evaluation and tx session. Tx session focused on education re: pain management techniques as well as AE recommendations to maximize independence with basic self cares. Pt instructed on exhaling with change of position for each transfer t/o session. OT explained benefits of the latter and pt reports improvement with sit<>stand and pain. Recommended to pt a reacher as well as a long handled soap sponge and discussed where to obtain. Pt verbalized understanding but also reinforced will have spouse to assist if needed. Recommended to pt loose fitting LB clothing at this time for ease of dressing. Discussed ensuring any area/throw rugs are tacked down to decrease fall risk. Pt with good understanding of education provided. Will continue to follow t/o hospital course to address goals and deficits listed in evaluation. End of Consult   Education Provided Yes;Family or social support of family present for education by provider   Patient Position at End of Consult Bedside chair; All needs within reach   Nurse Communication Nurse aware of consult       OALS:    Pt will achieve the following within specified time frame:  *Perform ADL transfers at mod (I)/(I) level for inc'd independence with ADLs/purposeful tasks    *Bed mobility- mod (I) for inc'd independence to manage own comfort and initiate EOB & OOB purposeful tasks    *Perform UB ADL at (I) level for inc'd independence with self cares    *Perform LB ADL at mod (I) level using AE prn for inc'd independence with self cares    *Increase dynamic seated balance to Good for inc'd safety with seated purposeful tasks.      *Increase static stand balance to Good and dyn stand balance to Fair+ for inc'd safety with standing purposeful tasks    *Increase stand tolerance x7-10m for inc'd tolerance with standing purposeful tasks    *Perform clothing management/hygiene for toileting at (I)ly level for inc'd independence with self cares    *Perform sinkside G&H (I)ly, with good safety and Good balance for inc'd independence with self cares      Thank you  Tmamyn Sukhdeep

## 2023-11-14 NOTE — CASE MANAGEMENT
Case Management Assessment & Discharge Planning Note    Patient name Zena Fullerton  Location S /S -70 MRN [id number]  : 1960 Date 2023       Current Admission Date: 2023  Current Admission Diagnosis:Intussusception [facility])   Patient Active Problem List    Diagnosis Date Noted    Intussusception ([address]) 2023    Bell palsy 2023    Stroke-like symptoms 05/10/2023    Obesity 05/10/2023    Headache 05/10/2023    Abnormal Pap smear of cervix 2020    Pain of upper abdomen 2020    Liver cyst 2020    Screen for colon cancer 2020    Epigastric pain 2020    Screening for breast cancer 2020    Encounter for immunization 2020    Hypokalemia 2020    Depression 2020      LOS (days): 3  Geometric Mean LOS (GMLOS) (days):   Days to GMLOS:     OBJECTIVE:    Risk of Unplanned Readmission Score: 14.45         Current admission status: Inpatient  Referral Reason: Other (Post Acute Needs)    Preferred Pharmacy:   [address] [place], [place] [address], [address] [place] [address]  Phone: [phone] Fax: [phone]    Primary Care Provider: No primary care provider on file. Primary Insurance: BLUE CROSS  Secondary Insurance:     ASSESSMENT:  Active Health Care Proxies    There are no active Health Care Proxies on file. Readmission Root Cause  30 Day Readmission: No    Patient Information  Admitted from[de-identified] Home  Mental Status: Alert  During Assessment patient was accompanied by: Other-Comment (Nephew)  Assessment information provided by[de-identified] Patient  Primary Caregiver: Self  Support Systems: Self, Spouse/significant other, Family members  [facility]: [address] [place] [address] do you live in?: [address] entry access options.  Select all that apply.: Stairs  Number of steps to enter home.: 7  Do the steps have railings?: Yes  Type of Current Residence: [address] [place] [address] home  Upon entering residence, is there a bedroom on the main floor (no further steps)?: No  A bedroom is located on the following floor levels of residence (select all that apply):: 2nd Floor  Upon entering residence, is there a bathroom on the main floor (no further steps)?: No  Indicate which floors of current residence have a bathroom (select all the apply):: 2nd Floor  Number of steps to 2nd floor from main floor: One Flight  In the last 12 months, was there a time when you were not able to pay the mortgage or rent on time?: No  In the last 12 months, how many places have you lived?: 1  In the last 12 months, was there a time when you did not have a steady place to sleep or slept in a shelter (including now)?: No  Homeless/housing insecurity resource given?: N/A  Living Arrangements: Lives w/ Spouse/significant other, Other (Comment) (Lives with her  and cousin)  Is patient a ?: No    Activities of Daily Living Prior to Admission  Functional Status: Independent  Completes ADLs independently?: Yes  Ambulates independently?: Yes  Does patient use assisted devices?: No  Does patient currently own DME?: No  Does patient have a history of Outpatient Therapy (PT/OT)?: No  Does the patient have a history of Short-Term Rehab?: No  Does patient have a history of HHC?: No  Does patient currently have [facility]?: No         Patient Information Continued  Income Source: Employed  Does patient have prescription coverage?: Yes  Within the past 12 months, you worried that your food would run out before you got the money to buy more.: Never true  Within the past 12 months, the food you bought just didn't last and you didn't have money to get more.: Never true  Food insecurity resource given?: N/A  Does patient receive dialysis treatments?: No  Does patient have a history of substance abuse?: No  Does patient have a history of Mental Health Diagnosis?: No         Means of Transportation  Means of Transport to hospitals[de-identified] Drives Self  In the past 12 months, has lack of transportation kept you from medical appointments or from getting medications?: No  In the past 12 months, has lack of transportation kept you from meetings, work, or from getting things needed for daily living?: No  Was application for public transport provided?: N/A        DISCHARGE DETAILS:    Discharge planning discussed with[de-identified] Patient and nephew  Freedom of Choice: Yes  Comments - [facility] of [facility]  CM contacted family/caregiver?: Yes  Were Treatment Team discharge recommendations reviewed with patient/caregiver?: Yes  Did patient/caregiver verbalize understanding of patient care needs?: Yes  Were patient/caregiver advised of the risks associated with not following Treatment Team discharge recommendations?: Yes    Contacts  Patient Contacts: Patient  Relationship to Patient[de-identified] Other (Comment)  Contact Method:  In Person  Reason/Outcome: Discharge Planning, Continuity of Care, Referral    Requested [facility]         Is the patient interested in [facility] at discharge?: Yes  [facility] requested[de-identified] Nursing, Occupational Therapy, Physical [facility] Name[de-identified] Other (TBD)  [address] [place] [address] Provider[de-identified] PCP  Home Health Services Needed[de-identified] Evaluate Functional Status and Safety, Gait/ADL Training, Strengthening/Theraputic Exercises to Improve Function  Homebound Criteria Met[de-identified] Requires the Assistance of Another Person for Safe Ambulation or to Leave the Home, Uses an Assist Device (i.e. cane, walker, etc)  Supporting Clincal Findings[de-identified] Limited Endurance, Fatigues Easliy in [employer]    DME Referral Provided  Referral made for DME?: No ([facility])    Other Referral/Resources/Interventions Provided:  Interventions: [facility]         Treatment Team Recommendation: Home with [facility]  Discharge Destination Plan[de-identified] [facility] : Family                                         CM met with patient and her nephew at bedside. CM name and role reviewed. CM assessment completed and charted above. CM discussed with patient home with [address] [place] [address]. Patient is agreeable to CM making a blanket [address] [place] [address] referral.  CM confirmed with patient who her PCP is as this will be needed for the [address] [place] [address]. Patient reported her PCP is Shraddha Tate [place]. CM reviewed discharge planning process including the following: identifying caregivers at home, preference for d/c planning needs, [facility] to Bed program, availability of treatment team to discuss questions or concerns patient and/or family may have regarding diagnosis, plan of care, old or new medications and discharge planning. CM will continue to follow for care coordination and update assessment as necessary.

## 2023-11-14 NOTE — PLAN OF CARE
Problem: OCCUPATIONAL THERAPY ADULT  Goal: Performs self-care activities at highest level of function for planned discharge setting. See evaluation for individualized goals. Description: Treatment Interventions: ADL retraining, Functional transfer training, UE strengthening/ROM, Patient/family training, Equipment evaluation/education, Compensatory technique education, Continued evaluation, Activityengagement          See flowsheet documentation for full assessment, interventions and recommendations. Outcome: Progressing  Note: Limitation: Decreased ADL status, Decreased endurance, Decreased self-care trans, Decreased high-level ADLs (impaired pain, balance, fxnl mobility, act serina, fxnl reach)  Prognosis: Good  Assessment: Pt is a 59 y.o. female seen for OT evaluation s/p admission to [address] [place] [address] on 11/11/2023  due to abdominal pain causing shallow breathing. Pt also with bloody diarrhea. Pt diagnosis: Intussusception ([address] Pt had LAPAROTOMY OPEN W/ RIGHT COLON RESECTION, Repair of ventral hernia x2, repair of incisional hernia x1, excision of right lateral hepatic liver cyst, Tap block-transverse abdominal muscular plane neurologic block and is POD 2. Pt with OT orders. Pt's PMH impacting occupational performance is listed above. Pt's PLOF also listed in above flowsheet. Pt is (I) PTA and lives with spouse and family, working greater than full time and motivated to return to [employer]. Personal and environmental factors supporting pt at time of IE include age, (I) PLOF, supportive family, and attitude towards recovery. During evaluation pt performed as is outlined above in flowsheet. Standardized assessments used to assist in identifying performance deficits include [facility] 6-Clicks. Pt's raw score on the AM-PAC Daily activity inpatient short form is 22, standardized score is 47.1, which is greater than 39.4. Patients at this level are likely to benefit from DC to home.  Performance deficits that affect the pt’s occupational performance can be seen above. Due to pt's current functional limitations and medical complications, pt is functioning below baseline level of independence. Pt would benefit from continued skilled OT treatment in order to maximize safety, independence and overall performance with ADLs, IADLs, functional mobility, and functional transfers in order to achieve highest level of function. Based on functional performance deficits and assessments, this evaluation is identified as a moderate complexity evaluation.      Rehab Resource Intensity Level, OT: II (Moderate Resource Intensity)

## 2023-11-14 NOTE — PROGRESS NOTES
Progress Note - General Surgery   Lori Perez 59 y.o. female MRN: [id number]  Unit/Bed#: S -01 Encounter: [id number]    Assessment:  63F s/p R hemicolectomy for intussusception    Plan:  -Cont Soft diet  -dvt ppx  -OOB and ambulate  -PT/OT: level II  -CM: blanket HHC referral made  -dispo planning    Subjective/Objective     Subjective: no acute events overnight. Pt denies N/V. Passing flatus. Small BM. Voiding. Ambulating halls. Mild abd discomfort. Objective: AVSS on RA    Blood pressure 124/67, pulse 72, temperature 99.3 °F (37.4 °C), temperature source Oral, resp. rate 18, height 4' 11" (1.499 m), weight 76 kg (167 lb 8.8 oz), SpO2 95 %, not currently breastfeeding. ,Body mass index is 33.84 kg/m². UOP: x1  BM: x1    Invasive Devices       Peripheral Intravenous Line  Duration             Peripheral IV 11/12/23 Dorsal (posterior); Left Wrist 3 days    Peripheral IV 11/13/23 Dorsal (posterior); Right Wrist 1 day                    Physical Exam:  General: No acute distress, alert and oriented  CV: RRR  Lungs: Normal work of breathing   Abdomen: soft, post surgical tenderness,nd.  Incision/s c/d/i  Skin: Warm, dry    Lab, Imaging and other studies:  Recent Labs     11/13/23  0425 11/14/23  0552 11/15/23  0652   WBC 14.35* 8.98 10.80*   HGB 9.4* 9.4* 10.8*    275 309   SODIUM 140 137 135   K 3.6 3.6 3.8   * 107 105   CO2 26 27 27   BUN 7 7 8   CREATININE 0.49* 0.50* 0.48*   GLUC 107 93 95   CALCIUM 7.9* 7.7* 8.5   MG 2.0  --   --    PHOS 2.8  --   --

## 2023-11-14 NOTE — UTILIZATION REVIEW
Continued Stay Review    Date: 11/14/23                          Current Patient Class: IP  Current Level of Care:  MS    HPI:63 y.o. female initially admitted on 11/11/23 . s/p R hemicolectomy 11/12/23 for intussusception. Assessment/Plan: 11/14 POD #2  Pt started on CL yesterday with IVF infusing . To;lerating CL diet . Pt reports RUQ abdominal pain . Abdomen soft, non distended. Incisions C/DF/I . No bm , is passing flatus . Pt has Dilaudid PCA infusing . Plan- d/c PCA today and transition to po pain meds . Continue OOB ambulation, spirometry. Pt reaching 1500 ml on spirometer. Advance diet to soft diet today and D/C IVF . PT: II (moderate resource intensity), but anticipate pt will progress to III (minimum) by discharge .  CBC, BMP in am .     Vital Signs:   Date/Time Temp Pulse Resp BP MAP (mmHg) SpO2 O2 Device   11/14/23 10:51:28 99 °F (37.2 °C) 69 -- 121/69 86 95 % --   11/14/23 0721 -- -- -- -- -- 96 % None (Room air)   11/14/23 06:30:15 99.6 °F (37.6 °C) 66 16 121/69 86 94 % --   11/14/23 0626 -- -- -- -- -- 94 % None (Room air)   11/14/23 0433 -- -- -- -- -- 94 % EtCO2 nasal cannula   11/14/23 03:00:34 99 °F (37.2 °C) 70 18 122/69 87 95 % --   11/14/23 00:30:42 99.2 °F (37.3 °C) 70 18 119/69 86 98 % --   11/13/23 2315 -- -- -- -- -- 97 % EtCO2 nasal cannula   11/13/23 2313 -- -- -- -- -- -- EtCO2 nasal cannula   11/13/23 22:42:43 99.4 °F (37.4 °C) 72 16 122/69 87 94 % --   11/13/23 2004 -- -- -- -- -- 94 % EtCO2 nasal cannula   11/13/23 1906 -- -- -- -- -- -- EtCO2 nasal cannula   11/13/23 1613 -- -- -- -- -- 95 % EtCO2 nasal cannula   11/13/23 15:19:57 99.3 °F (37.4 °C) 82 -- 134/70 91 95 % --   11/13/23 1206 -- -- -- -- -- 96 % None (Room air)   11/13/23 11:45:01 -- 74 -- -- -- 96 % --   11/13/23 1100 -- 72 -- -- -- -- --   11/13/23 0722 -- -- -- -- -- 96 % EtCO2 nasal cannula   11/13/23 07:09:54 99 °F (37.2 °C) 80 16 136/69 91 97 % --   11/13/23 0457 -- -- -- -- -- 92 % None (Room air)   11/13/23 03:19:18 98.6 °F (37 °C) 76 17 139/69 92 93 % --   11/12/23 2349 -- -- -- -- -- 97 % None (Room air)   11/12/23 22:43:53 100 °F (37.8 °C) 80 16 138/70 93 94 % --     11/12/23 19:14:37 98.9 °F (37.2 °C) 94 17 139/71 94 95 % 32 -- 3 L/min Nasal cannula   11/12/23 16:41:09 99.2 °F (37.3 °C) 73 16 120/72 88 97 % -- -- -- --   11/12/23 16:40:43 99.2 °F (37.3 °C) 73 16 120/72 88 97 % 32 -- 3 L/min Nasal cannula   11/12/23 15:15:40 99.4 °F (37.4 °C) 95 17 143/85 104 96 % 32 -- 3 L/min Nasal cannula   11/12/23 13:38:14 99.1 °F (37.3 °C) 80 17 129/73 92 96 % 32 -- 3 L/min Nasal cannula   11/12/23 1252 -- -- -- -- -- -- 32 -- 3 L/min Nasal cannula   11/12/23 12:27:30 99.6 °F (37.6 °C) 76 17 117/67 84 96 % 32 -- 3 L/min Nasal cannula   11/12/23 12:00:11 98.4 °F (36.9 °C) 70 17 112/61 78 95 % 32 -- 3 L/min Nasal cannula   11/12/23 11:36:01 99.3 °F (37.4 °C) 81 17 101/59 73 93 % -- -- -- --   11/12/23 1115 98.9 °F (37.2 °C) 72 20 104/63 -- 94 % 32 -- 3 L/min Nasal cannula   11/12/23 1100 98.9 °F (37.2 °C) 73 20 104/57 -- 95 % -- 5 L/min -- Simple mask     Pertinent Labs/Diagnostic Results:       Results from last 7 days   Lab Units 11/14/23  0552 11/13/23  0425 11/12/23  0435 11/11/23  2238 11/11/23  1700   WBC Thousand/uL 8.98 14.35* 8.94  --  11.01*   HEMOGLOBIN g/dL 9.4* 9.4* 10.4*  --  11.2*   HEMATOCRIT % 30.8* 29.6* 32.4*  --  35.6   PLATELETS Thousands/uL 275 267 293   < > 320   NEUTROS ABS Thousands/µL 5.48 11.61* 5.11  --  6.48    < > = values in this interval not displayed.          Results from last 7 days   Lab Units 11/14/23  0552 11/13/23 0425 11/12/23  0435 11/11/23  1700   SODIUM mmol/L 137 140 139 139   POTASSIUM mmol/L 3.6 3.6 3.7 3.7   CHLORIDE mmol/L 107 109* 106 107   CO2 mmol/L 27 26 28 26   ANION GAP mmol/L 3 5 5 6   BUN mg/dL 7 7 12 12   CREATININE mg/dL 0.50* 0.49* 0.56* 0.73   EGFR ml/min/1.73sq m 104 104 100 88   CALCIUM mg/dL 7.7* 7.9* 8.1* 8.8   MAGNESIUM mg/dL  --  2.0 2.2  --    PHOSPHORUS mg/dL --  2.8 3.1  --      Results from last 7 days   Lab Units 11/11/23  1700   AST U/L 16   ALT U/L 14   ALK PHOS U/L 67   TOTAL PROTEIN g/dL 7.4   ALBUMIN g/dL 3.9   TOTAL BILIRUBIN mg/dL 0.33         Results from last 7 days   Lab Units 11/14/23  0552 11/13/23  0425 11/12/23  0435 11/11/23  1700   GLUCOSE RANDOM mg/dL 93 107 94 93             Results from last 7 days   Lab Units 11/11/23  1700   LIPASE u/L 40         Results from last 7 days   Lab Units 11/11/23  2321   CEA ng/mL 0.9                 Medications:   Scheduled Medications:  acetaminophen, 975 mg, Oral, Q6H AJ  enoxaparin, 40 mg, Subcutaneous, Q24H  gabapentin, 100 mg, Oral, 4x Daily  methocarbamol, 500 mg, Oral, Q6H AJ    potassium chloride (K-DUR,KLOR-CON) CR tablet 40 mEq  Dose: 40 mEq  Freq: Once Route: PO  Start: 11/14/23 0930 End: 11/14/23 1053   calcium carbonate (TUMS) chewable tablet 1,000 mg  Dose: 1,000 mg  Freq:  Once Route: PO  Start: 11/14/23 0930 End: 11/14/23 1053       Continuous IV Infusions:   HYDROmorphone (DILAUDID) 1 mg/mL 50 mL PCA  Continuous Rate: 0 mg/hr  PCA Dose: 0.2 mg  PCA Lock-out: 5 Minutes  One Hour Limit: 2.2 mg  Freq: Continuous Route: IV  Last Dose: Stopped (11/14/23 0926)  Start: 11/12/23 1045 End: 11/14/23 0914   dextrose 5 % and sodium chloride 0.45 % with KCl 20 mEq/L infusion  Rate: 50 mL/hr Dose: 50 mL/hr  Freq: Continuous Route: IV  Indications of Use: IV Hydration  Last Dose: Stopped (11/14/23 0926)  Start: 11/13/23 0715 End: 11/14/23 0914       PRN Meds:  diphenhydrAMINE, 25 mg, Intravenous, Q6H PRN  HYDROmorphone, 0.2 mg, Intravenous, Q3H PRN  naloxone, 0.1 mg, Intravenous, Q1MIN PRN  ondansetron, 4 mg, Intravenous, Q4H PRN  oxyCODONE, 5 mg, Oral, Q4H PRN  oxyCODONE, 2.5 mg, Oral, Q4H PRN x1 11/14         Discharge Plan: TBD    Network Utilization Review Department  ATTENTION: Please call with any questions or concerns to [phone] and carefully listen to the prompts so that you are directed to the right person. All voicemails are confidential.   For Discharge needs, contact Care Management DC Support Team at [phone] opt. 2  Send all requests for admission clinical reviews, approved or denied determinations and any other requests to dedicated fax number below belonging to the campus where the patient is receiving treatment.  List of dedicated fax numbers for the Facilities:  [place] DENIALS (Administrative/Medical Necessity) [phone]   DISCHARGE SUPPORT TEAM (NETWORK) [address] (Maternity/NICU/Pediatrics) [phone]   [address] [place] [address] 1000 [facility] [phone]   1503 [facility] 207 [facility] [address] [place] [address] [address] [address] [facility] [address] [facility] [address] [phone]

## 2023-11-15 LAB
ANION GAP SERPL CALCULATED.3IONS-SCNC: 3 MMOL/L
BASOPHILS # BLD AUTO: 0.02 THOUSANDS/ÂΜL (ref 0–0.1)
BASOPHILS NFR BLD AUTO: 0 % (ref 0–1)
BUN SERPL-MCNC: 8 MG/DL (ref 5–25)
CALCIUM SERPL-MCNC: 8.5 MG/DL (ref 8.4–10.2)
CHLORIDE SERPL-SCNC: 105 MMOL/L (ref 96–108)
CO2 SERPL-SCNC: 27 MMOL/L (ref 21–32)
CREAT SERPL-MCNC: 0.48 MG/DL (ref 0.6–1.3)
EOSINOPHIL # BLD AUTO: 0.17 THOUSAND/ÂΜL (ref 0–0.61)
EOSINOPHIL NFR BLD AUTO: 2 % (ref 0–6)
ERYTHROCYTE [DISTWIDTH] IN BLOOD BY AUTOMATED COUNT: 14.3 % (ref 11.6–15.1)
GFR SERPL CREATININE-BSD FRML MDRD: 105 ML/MIN/1.73SQ M
GLUCOSE SERPL-MCNC: 95 MG/DL (ref 65–140)
HCT VFR BLD AUTO: 32.2 % (ref 34.8–46.1)
HGB BLD-MCNC: 10.8 G/DL (ref 11.5–15.4)
IMM GRANULOCYTES # BLD AUTO: 0.06 THOUSAND/UL (ref 0–0.2)
IMM GRANULOCYTES NFR BLD AUTO: 1 % (ref 0–2)
LYMPHOCYTES # BLD AUTO: 2.16 THOUSANDS/ÂΜL (ref 0.6–4.47)
LYMPHOCYTES NFR BLD AUTO: 20 % (ref 14–44)
MCH RBC QN AUTO: 30.5 PG (ref 26.8–34.3)
MCHC RBC AUTO-ENTMCNC: 33.5 G/DL (ref 31.4–37.4)
MCV RBC AUTO: 91 FL (ref 82–98)
MONOCYTES # BLD AUTO: 0.61 THOUSAND/ÂΜL (ref 0.17–1.22)
MONOCYTES NFR BLD AUTO: 6 % (ref 4–12)
NEUTROPHILS # BLD AUTO: 7.78 THOUSANDS/ÂΜL (ref 1.85–7.62)
NEUTS SEG NFR BLD AUTO: 71 % (ref 43–75)
NRBC BLD AUTO-RTO: 0 /100 WBCS
PLATELET # BLD AUTO: 309 THOUSANDS/UL (ref 149–390)
PMV BLD AUTO: 10.2 FL (ref 8.9–12.7)
POTASSIUM SERPL-SCNC: 3.8 MMOL/L (ref 3.5–5.3)
RBC # BLD AUTO: 3.54 MILLION/UL (ref 3.81–5.12)
SODIUM SERPL-SCNC: 135 MMOL/L (ref 135–147)
WBC # BLD AUTO: 10.8 THOUSAND/UL (ref 4.31–10.16)

## 2023-11-15 PROCEDURE — 85025 COMPLETE CBC W/AUTO DIFF WBC: CPT

## 2023-11-15 PROCEDURE — 80048 BASIC METABOLIC PNL TOTAL CA: CPT

## 2023-11-15 PROCEDURE — 99231 SBSQ HOSP IP/OBS SF/LOW 25: CPT | Performed by: SURGERY

## 2023-11-15 RX ADMIN — GABAPENTIN 100 MG: 100 CAPSULE ORAL at 11:57

## 2023-11-15 RX ADMIN — ACETAMINOPHEN 975 MG: 325 TABLET, FILM COATED ORAL at 01:22

## 2023-11-15 RX ADMIN — ACETAMINOPHEN 975 MG: 325 TABLET, FILM COATED ORAL at 16:45

## 2023-11-15 RX ADMIN — GABAPENTIN 100 MG: 100 CAPSULE ORAL at 20:41

## 2023-11-15 RX ADMIN — METHOCARBAMOL TABLETS 500 MG: 500 TABLET, COATED ORAL at 11:56

## 2023-11-15 RX ADMIN — ENOXAPARIN SODIUM 40 MG: 40 INJECTION SUBCUTANEOUS at 12:00

## 2023-11-15 RX ADMIN — ACETAMINOPHEN 975 MG: 325 TABLET, FILM COATED ORAL at 06:09

## 2023-11-15 RX ADMIN — Medication 2.5 MG: at 20:41

## 2023-11-15 RX ADMIN — GABAPENTIN 100 MG: 100 CAPSULE ORAL at 16:45

## 2023-11-15 RX ADMIN — METHOCARBAMOL TABLETS 500 MG: 500 TABLET, COATED ORAL at 06:09

## 2023-11-15 RX ADMIN — ACETAMINOPHEN 975 MG: 325 TABLET, FILM COATED ORAL at 11:58

## 2023-11-15 RX ADMIN — METHOCARBAMOL TABLETS 500 MG: 500 TABLET, COATED ORAL at 01:22

## 2023-11-15 RX ADMIN — METHOCARBAMOL TABLETS 500 MG: 500 TABLET, COATED ORAL at 16:45

## 2023-11-15 RX ADMIN — GABAPENTIN 100 MG: 100 CAPSULE ORAL at 09:50

## 2023-11-15 NOTE — CASE MANAGEMENT
Case Management Discharge Planning Note    Patient name Pauline Bey  Location S /S -39 MRN [id number]  : 1960 Date 11/15/2023       Current Admission Date: 2023  Current Admission Diagnosis:Intussusception [facility])   Patient Active Problem List    Diagnosis Date Noted    Intussusception ([address]) 2023    Bell palsy 2023    Stroke-like symptoms 05/10/2023    Obesity 05/10/2023    Headache 05/10/2023    Abnormal Pap smear of cervix 2020    Pain of upper abdomen 2020    Liver cyst 2020    Screen for colon cancer 2020    Epigastric pain 2020    Screening for breast cancer 2020    Encounter for immunization 2020    Hypokalemia 2020    Depression 2020      LOS (days): 4  Geometric Mean LOS (GMLOS) (days):   Days to GMLOS:     OBJECTIVE:  Risk of Unplanned Readmission Score: 12.3         Current admission status: Inpatient   Preferred Pharmacy:   [address] [place] [address] [place], [address] [place] [address]  Phone: [phone] Fax: [phone]    Primary Care Provider: No primary care provider on file. Primary Insurance: BLUE CROSS  Secondary Insurance:     DISCHARGE DETAILS:                                          Other Referral/Resources/Interventions Provided:  Interventions: [facility]       CM followed up on 1475  [address] [facility] referrals made for patient. [address] Visiting Nurses are following but following up with patient's insurance to verify her benefits and coverage. CM department will continue to follow to assist with discharge coordination.

## 2023-11-15 NOTE — PHYSICAL THERAPY NOTE
PHYSICAL THERAPY TREATMENT NOTE  NAME:  Julien Bonilla  DATE: 11/14/23    Length Of Stay: 3  Performed at least 2 patient identifiers during session: Name and Birthday    TREATMENT:    11/14/23 0913   PT Last Visit   PT Visit Date 11/14/23   Note Type   Note Type Treatment   Pain Assessment   Pain Assessment Tool 0-10   Pain Score 3   Pain Location/Orientation Location: Abdomen   Effect of Pain on Daily Activities Limits speed and mobility especially transitional movements as well as upright standing posture   Patient's Stated Pain Goal No pain   Hospital Pain Intervention(s) Repositioned; Ambulation/increased activity;Cold applied  (RN notified)   Restrictions/Precautions   Weight Bearing Precautions Per Order No   Other Precautions Multiple lines;Pain  (PCA pump)   General   Chart Reviewed Yes   Response to Previous Treatment Patient with no complaints from previous session. Family/Caregiver Present No   Cognition   Overall Cognitive Status WFL   Arousal/Participation Alert   Attention Within functional limits   Comments Patient declined [employer] translation   Subjective   Subjective Upon entering room, patient's call bell going off. Patient found in the bathroom and reported ambulating here on her own without an assistive device. Denies lightheadedness. Agreeable to participate. Also reports not ambulating with any other staff out of the room since PT session yesterday with   Transfers   Sit to Stand 5  Supervision   Additional items Assist x 1; Increased time required;Verbal cues   Stand to Sit 5  Supervision   Additional items Assist x 1; Increased time required;Verbal cues   Toilet transfer 6  Modified independent   Ambulation/Elevation   Gait pattern Excessively slow; Step through pattern  (Increased walker advancement which increases throughout gait trial)   Gait Assistance 4  Minimal assist  (<20% of gait trial, otherwise supervision.)   Additional items Verbal cues; Tactile cues  (Frequent verbal instruction for walker management around obstacles and staying inside of walker)   Assistive Device Rolling walker   Distance 20'+80'   Stair Management Assistance Not tested   Ambulation/Elevation Additional Comments Patient initially attempted to ambulate without an assistive device or with unilateral support of the IV pole. However after taking 2 steps while in the bathroom, patient provided rolling walker for further ambulation trials   Balance   Static Sitting Good   Static Standing Fair   Ambulatory Fair -   Endurance Deficit   Endurance Deficit Yes   Endurance Deficit Description Needs therapeutic rest between mobility trials. Self-reported fatigue. Degradation of gait quality with increased ambulation distance. Activity Tolerance   Activity Tolerance Patient limited by pain; Patient limited by fatigue   Nurse Made Aware Spoke to RN post session. Exercises   Ankle Pumps Sitting;Bilateral;AROM;5 reps  (With patient instructed to perform 10 reps every hour when she performs her incentive spirometry)   Assessment   Prognosis Good   Problem List Decreased endurance; Impaired balance;Decreased mobility;Pain;Decreased skin integrity;Obesity  (Gait deviations, limited posture)   Assessment Ricardo Brand is able to ambulate further distances today and needed less assistance for performing transfers. She also needed slightly less A for gait trials. However pt continues to have degradation of gait quality with increased ambulation distance. Skilled PT recommended to progress pt toward treatment goals. Barriers to Discharge Inaccessible home environment   Goals   Patient Goals Less pain, and walk better. STG Expiration Date 11/23/23   PT Treatment Day 1   Plan   Treatment/Interventions Functional transfer training;LE strengthening/ROM; Therapeutic exercise; Endurance training;Cognitive reorientation;Patient/family training;Equipment eval/education; Bed mobility;Gait training   Progress Slow progress, decreased activity tolerance   PT Frequency 3-5x/wk   Discharge Recommendation   Rehab Resource Intensity Level, PT II (Moderate Resource Intensity)   Equipment Recommended (S)  Walker  (Short rolling walker, however anticipate patient will make mobility progress by the time of medical discharge--we will continue to monitor)   [facility] Basic Mobility Inpatient   Turning in Flat Bed Without Bedrails 3   Lying on Back to Sitting on Edge of Flat Bed Without Bedrails 2   Moving Bed to Chair 3   Standing Up From Chair Using Arms 3   Walk in Room 3   Climb 3-5 Stairs With Railing 1   Basic Mobility Inpatient Raw Score 15   Basic Mobility Standardized Score 36.97   Highest Level Of Mobility   -HL Goal 4: Move to chair/commode   JH-HLM Achieved 7: Walk 25 feet or more   Education   Education Provided Assistive device; Mobility training   Patient Reinforcement needed; Explanation/teachback used   End of Consult   Patient Position at End of Consult Seated edge of bed; All needs within reach     Nursing Recommendations:   Mobility Plan as of 11/14/23: Pt is 1 Assist with RW to/from bathroom and hallway. Encourage OOB for all meals. The patient's [facility] Basic Mobility Inpatient Short Form Raw Score is 15. A Raw score of less than or equal to 16 suggests the patient may benefit from discharge to post-acute rehabilitation services, which DOES NOT coincide with CURRENT above PT recommendations as I anticipate pt to progress with mobility prior to to DC. However please refer to therapist recommendation for discharge planning given other factors that may influence destination. Adapted from [employer] [facility] “6-Clicks” Basic Mobility and Daily Activity Scores With Discharge Destination. Physical Therapy, 2021;101:1-9.  DOI: [email or web address]    Sridevi Duran, PT, DPT

## 2023-11-15 NOTE — CASE MANAGEMENT
Case Management Progress Note    Patient name Cassy Sanchez  Location S /S -01 MRN [id number]  : 1960 Date 11/15/2023       LOS (days): 4  Geometric Mean LOS (GMLOS) (days):   Days to GMLOS:        OBJECTIVE:        Current admission status: Inpatient  Preferred Pharmacy:   [address], [address] [address]  Phone: [phone] Fax: [phone]    Primary Care Provider: No primary care provider on file.     Primary Insurance: BLUE CROSS  Secondary Insurance:     PROGRESS NOTE:    Weekly Care Management Length of Stay Review     Current LOS: 4 Days    Most Recent Labs:     Lab Results   Component Value Date/Time    WBC 10.80 (H) 11/15/2023 06:52 AM    HGB 10.8 (L) 11/15/2023 06:52 AM    HCT 32.2 (L) 11/15/2023 06:52 AM     11/15/2023 06:52 AM    SODIUM 135 11/15/2023 06:52 AM    K 3.8 11/15/2023 06:52 AM     11/15/2023 06:52 AM    CO2 27 11/15/2023 06:52 AM    BUN 8 11/15/2023 06:52 AM    CREATININE 0.48 (L) 11/15/2023 06:52 AM    GLUC 95 11/15/2023 06:52 AM       Most Recent Vitals:   Vitals:    11/15/23 1505   BP: 122/63   Pulse: 80   Resp:    Temp: 98.9 °F (37.2 °C)   SpO2: 95%        Identified Barriers to Discharge/Discharge Goals/Care Management Interventions: r hemicolectomy, advance diet, transition to pain meds    Intended Discharge Disposition: [facility] AT [facility] referrals made    Expected Discharge Date: 48hr

## 2023-11-15 NOTE — PLAN OF CARE
Problem: PHYSICAL THERAPY ADULT  Goal: Performs mobility at highest level of function for planned discharge setting. See evaluation for individualized goals. Description: Treatment/Interventions: Functional transfer training, LE strengthening/ROM, Therapeutic exercise, Endurance training, Patient/family training, Equipment eval/education, Gait training, Bed mobility, Elevations, Spoke to nursing  Equipment Recommended: (S) Walker (Short rolling walker, however anticipate patient will make mobility progress by the time of medical discharge--we will continue to monitor)       See flowsheet documentation for full assessment, interventions and recommendations. Outcome: Progressing  Note: Prognosis: Good  Problem List: Decreased endurance, Impaired balance, Decreased mobility, Pain, Decreased skin integrity, Obesity (Gait deviations, limited posture)  Assessment: Chantel Talavera is able to ambulate further distances today and needed less assistance for performing transfers. She also needed slightly less A for gait trials. However pt continues to have degradation of gait quality with increased ambulation distance. Skilled PT recommended to progress pt toward treatment goals. Barriers to Discharge: Inaccessible home environment     Rehab Resource Intensity Level, PT: II (Moderate Resource Intensity)    See flowsheet documentation for full assessment.

## 2023-11-15 NOTE — PLAN OF CARE
Problem: PAIN - ADULT  Goal: Verbalizes/displays adequate comfort level or baseline comfort level  Description: Interventions:  - Encourage patient to monitor pain and request assistance  - Assess pain using appropriate pain scale  - Administer analgesics based on type and severity of pain and evaluate response  - Implement non-pharmacological measures as appropriate and evaluate response  - Consider cultural and social influences on pain and pain management  - Notify physician/advanced practitioner if interventions unsuccessful or patient reports new pain  Outcome: Progressing     Problem: INFECTION - ADULT  Goal: Absence or prevention of progression during hospitalization  Description: INTERVENTIONS:  - Assess and monitor for signs and symptoms of infection  - Monitor lab/diagnostic results  - Monitor all insertion sites, i.e. indwelling lines, tubes, and drains  - Monitor endotracheal if appropriate and nasal secretions for changes in amount and color  - [place] appropriate cooling/warming therapies per order  - Administer medications as ordered  - Instruct and encourage patient and family to use good hand hygiene technique  - Identify and instruct in appropriate isolation precautions for identified infection/condition  Outcome: Progressing     Problem: SAFETY ADULT  Goal: Maintain or return to baseline ADL function  Description: INTERVENTIONS:  -  Assess patient's ability to carry out ADLs; assess patient's baseline for ADL function and identify physical deficits which impact ability to perform ADLs (bathing, care of mouth/teeth, toileting, grooming, dressing, etc.)  - Assess/evaluate cause of self-care deficits   - Assess range of motion  - Assess patient's mobility; develop plan if impaired  - Assess patient's need for assistive devices and provide as appropriate  - Encourage maximum independence but intervene and supervise when necessary  - Involve family in performance of ADLs  - Assess for home care needs following discharge   - Consider OT consult to assist with ADL evaluation and planning for discharge  - Provide patient education as appropriate  Outcome: Progressing     Problem: GASTROINTESTINAL - ADULT  Goal: Minimal or absence of nausea and/or vomiting  Description: INTERVENTIONS:  - Administer IV fluids if ordered to ensure adequate hydration  - Maintain NPO status until nausea and vomiting are resolved  - Nasogastric tube if ordered  - Administer ordered antiemetic medications as needed  - Provide nonpharmacologic comfort measures as appropriate  - Advance diet as tolerated, if ordered  - Consider nutrition services referral to assist patient with adequate nutrition and appropriate food choices  Outcome: Progressing     Problem: GASTROINTESTINAL - ADULT  Goal: Maintains or returns to baseline bowel function  Description: INTERVENTIONS:  - Assess bowel function  - Encourage oral fluids to ensure adequate hydration  - Administer IV fluids if ordered to ensure adequate hydration  - Administer ordered medications as needed  - Encourage mobilization and activity  - Consider nutritional services referral to assist patient with adequate nutrition and appropriate food choices  Outcome: Progressing

## 2023-11-16 VITALS
WEIGHT: 167.55 LBS | DIASTOLIC BLOOD PRESSURE: 72 MMHG | OXYGEN SATURATION: 96 % | SYSTOLIC BLOOD PRESSURE: 128 MMHG | BODY MASS INDEX: 33.78 KG/M2 | HEART RATE: 69 BPM | RESPIRATION RATE: 20 BRPM | TEMPERATURE: 98.1 F | HEIGHT: 59 IN

## 2023-11-16 PROBLEM — K56.1 INTUSSUSCEPTION (HCC): Status: RESOLVED | Noted: 2023-11-13 | Resolved: 2023-11-16

## 2023-11-16 LAB
ANION GAP SERPL CALCULATED.3IONS-SCNC: 6 MMOL/L
BASOPHILS # BLD AUTO: 0.04 THOUSANDS/ÂΜL (ref 0–0.1)
BASOPHILS NFR BLD AUTO: 0 % (ref 0–1)
BUN SERPL-MCNC: 13 MG/DL (ref 5–25)
CALCIUM SERPL-MCNC: 8.9 MG/DL (ref 8.4–10.2)
CHLORIDE SERPL-SCNC: 105 MMOL/L (ref 96–108)
CO2 SERPL-SCNC: 27 MMOL/L (ref 21–32)
CREAT SERPL-MCNC: 0.53 MG/DL (ref 0.6–1.3)
DME PARACHUTE DELIVERY DATE REQUESTED: NORMAL
DME PARACHUTE ITEM DESCRIPTION: NORMAL
DME PARACHUTE ORDER STATUS: NORMAL
DME PARACHUTE SUPPLIER NAME: NORMAL
DME PARACHUTE SUPPLIER PHONE: NORMAL
EOSINOPHIL # BLD AUTO: 0.35 THOUSAND/ÂΜL (ref 0–0.61)
EOSINOPHIL NFR BLD AUTO: 3 % (ref 0–6)
ERYTHROCYTE [DISTWIDTH] IN BLOOD BY AUTOMATED COUNT: 14.4 % (ref 11.6–15.1)
GFR SERPL CREATININE-BSD FRML MDRD: 102 ML/MIN/1.73SQ M
GLUCOSE SERPL-MCNC: 90 MG/DL (ref 65–140)
HCT VFR BLD AUTO: 33.3 % (ref 34.8–46.1)
HGB BLD-MCNC: 10.8 G/DL (ref 11.5–15.4)
IMM GRANULOCYTES # BLD AUTO: 0.06 THOUSAND/UL (ref 0–0.2)
IMM GRANULOCYTES NFR BLD AUTO: 1 % (ref 0–2)
LYMPHOCYTES # BLD AUTO: 3.03 THOUSANDS/ÂΜL (ref 0.6–4.47)
LYMPHOCYTES NFR BLD AUTO: 28 % (ref 14–44)
MCH RBC QN AUTO: 29.7 PG (ref 26.8–34.3)
MCHC RBC AUTO-ENTMCNC: 32.4 G/DL (ref 31.4–37.4)
MCV RBC AUTO: 92 FL (ref 82–98)
MONOCYTES # BLD AUTO: 0.68 THOUSAND/ÂΜL (ref 0.17–1.22)
MONOCYTES NFR BLD AUTO: 6 % (ref 4–12)
NEUTROPHILS # BLD AUTO: 6.76 THOUSANDS/ÂΜL (ref 1.85–7.62)
NEUTS SEG NFR BLD AUTO: 62 % (ref 43–75)
NRBC BLD AUTO-RTO: 0 /100 WBCS
PLATELET # BLD AUTO: 345 THOUSANDS/UL (ref 149–390)
PMV BLD AUTO: 10.1 FL (ref 8.9–12.7)
POTASSIUM SERPL-SCNC: 4 MMOL/L (ref 3.5–5.3)
RBC # BLD AUTO: 3.64 MILLION/UL (ref 3.81–5.12)
SODIUM SERPL-SCNC: 138 MMOL/L (ref 135–147)
WBC # BLD AUTO: 10.92 THOUSAND/UL (ref 4.31–10.16)

## 2023-11-16 PROCEDURE — 85025 COMPLETE CBC W/AUTO DIFF WBC: CPT

## 2023-11-16 PROCEDURE — 99024 POSTOP FOLLOW-UP VISIT: CPT | Performed by: PHYSICIAN ASSISTANT

## 2023-11-16 PROCEDURE — 80048 BASIC METABOLIC PNL TOTAL CA: CPT

## 2023-11-16 PROCEDURE — 97116 GAIT TRAINING THERAPY: CPT

## 2023-11-16 RX ORDER — ACETAMINOPHEN 325 MG/1
500 TABLET ORAL EVERY 6 HOURS PRN
Refills: 0 | COMMUNITY
Start: 2023-11-16

## 2023-11-16 RX ORDER — OXYCODONE HYDROCHLORIDE 5 MG/1
2.5 TABLET ORAL EVERY 6 HOURS PRN
Qty: 5 TABLET | Refills: 0 | Status: SHIPPED | OUTPATIENT
Start: 2023-11-16 | End: 2023-11-26

## 2023-11-16 RX ADMIN — ENOXAPARIN SODIUM 40 MG: 40 INJECTION SUBCUTANEOUS at 11:46

## 2023-11-16 RX ADMIN — METHOCARBAMOL TABLETS 500 MG: 500 TABLET, COATED ORAL at 00:18

## 2023-11-16 RX ADMIN — Medication 2.5 MG: at 09:15

## 2023-11-16 RX ADMIN — METHOCARBAMOL TABLETS 500 MG: 500 TABLET, COATED ORAL at 05:30

## 2023-11-16 RX ADMIN — GABAPENTIN 100 MG: 100 CAPSULE ORAL at 09:15

## 2023-11-16 RX ADMIN — GABAPENTIN 100 MG: 100 CAPSULE ORAL at 11:43

## 2023-11-16 RX ADMIN — ACETAMINOPHEN 975 MG: 325 TABLET, FILM COATED ORAL at 11:43

## 2023-11-16 RX ADMIN — Medication 2.5 MG: at 00:23

## 2023-11-16 RX ADMIN — ACETAMINOPHEN 975 MG: 325 TABLET, FILM COATED ORAL at 05:30

## 2023-11-16 RX ADMIN — METHOCARBAMOL TABLETS 500 MG: 500 TABLET, COATED ORAL at 11:43

## 2023-11-16 NOTE — PROGRESS NOTES
Progress Note - General Surgery   LYDIA Resident on Surgery Service   Cassy Sanchez 59 y.o. female MRN: [id number]  Unit/Bed#: S -[facility] Encounter: [id number]    Assessment:  60 yo F s/p R hemicolectomy for intussusception on 11/12    Afebrile. VSS. BM 2x unrecorded  WBC 10.9 from 10.8  Hgb 10.8 from 1.8  Cr 0.53 from 0.48    Plan:  Surgical soft diet  PRN pain meds  PRN anti nausea meds  DVT prophylaxis  Out of bed and ambulating  Encourage IS  F/u CM, awaiting [facility] acceptance  Anticipated discharge today, if not today then tomorrow    Subjective/Objective     Subjective: No acute events overnight. Patient denies having nausea, vomiting, fevers, chills, chest pain, shortness of breath. Tolerating PO. Having bowel movements and passing flatus. Voiding without difficulty. Tolerating PO intake. Objective:     Blood pressure 120/65, pulse 82, temperature 98.1 °F (36.7 °C), resp. rate 16, height 4' 11" (1.499 m), weight 76 kg (167 lb 8.8 oz), SpO2 95 %, not currently breastfeeding. ,Body mass index is 33.84 kg/m². No intake or output data in the 24 hours ending 11/16/23 0946    Invasive Devices       Peripheral Intravenous Line  Duration             Peripheral IV 11/13/23 Dorsal (posterior); Right Wrist 2 days                    General: NAD  HENT: NCAT MMM  Neck: supple, no JVD  CV: nl rate  Lungs: nl wob. No resp distress  ABD: Soft, appropriately tender, nondistended.  Incisions are clean/dry/intact  Extrem: No CCE  Neuro: AAOx3       Scheduled Meds:  Current Facility-Administered Medications   Medication Dose Route Frequency Provider Last Rate    acetaminophen  975 mg Oral Q6H [address] Huber Mcleod MD      diphenhydrAMINE  25 mg Intravenous Q6H PRN Huber Mcleod MD      enoxaparin  40 mg Subcutaneous Q24H Axel Shay MD      gabapentin  100 mg Oral 4x Daily Huber Mcleod MD      HYDROmorphone  0.2 mg Intravenous Q3H PRN Jose Eduardo Grant MD      methocarbamol  500 mg Oral [address] Westley Moon MD      naloxone  0.1 mg Intravenous Q1MIN PRN Juju Whitlock MD      ondansetron  4 mg Intravenous Q4H PRN Juju Whitlock MD      oxyCODONE  5 mg Oral Q4H PRN Oscar Baxter MD      oxyCODONE  2.5 mg Oral Q4H PRN Oscar Baxter MD       Continuous Infusions:   PRN Meds:.  diphenhydrAMINE    HYDROmorphone    naloxone    ondansetron    oxyCODONE    oxyCODONE      Lab, Imaging and other studies:I have personally reviewed pertinent lab results.     VTE Pharmacologic Prophylaxis: Enoxaparin (Lovenox)  VTE Mechanical Prophylaxis: sequential compression device      Oscar Baxter MD  11/16/2023 9:46 AM

## 2023-11-16 NOTE — DISCHARGE SUMMARY
Discharge Summary   Angel Luis Andrea 59 y.o. female MRN: [id number]  Unit/Bed#: S -73 Encounter: [id number]    Admission Date: 11/11/2023     Discharge Date:11/16/23    Attending:Juan Carver MD     Consultants: CM, PT/OT    Admitting Diagnosis: Intussusception of colon ([address]) [K56.1]  Abdominal mass [R19.00]    Principle/ Secondary Diagnosis:  Past Medical History:   Diagnosis Date    Abdominal pain     "burning pain" came to the ED    Renal disorder     Wears dentures     permanent upper denture    Weight loss     20 lb wt loss in 2-3 weeks d/t stomach issues     Past Surgical History:   Procedure Laterality Date    CHOLECYSTECTOMY      EXPLORATORY LAPAROTOMY W/ BOWEL RESECTION N/A 11/12/2023    Procedure: LAPAROTOMY OPEN W/ RIGHT COLON RESECTION;  Surgeon: Chio Gomez MD;  [facility]: [facility];  Service: General    TUBAL LIGATION      at 36        Procedures Performed:   Orders Placed This Encounter   Procedures    ED ECG Documentation Only     No admission procedures for hospital encounter. LAPAROTOMY OPEN W/ RIGHT COLON RESECTION (Abdomen)  Procedure(s):  LAPAROTOMY OPEN W/ RIGHT COLON RESECTION    Laboratory data at discharge:   Results from last 7 days   Lab Units 11/16/23  0432 11/15/23  0652 11/14/23  0552   WBC Thousand/uL 10.92* 10.80* 8.98   HEMOGLOBIN g/dL 10.8* 10.8* 9.4*   HEMATOCRIT % 33.3* 32.2* 30.8*   PLATELETS Thousands/uL 345 309 275     Results from last 7 days   Lab Units 11/16/23  0432 11/15/23  0652 11/14/23  0552   POTASSIUM mmol/L 4.0 3.8 3.6   CHLORIDE mmol/L 105 105 107   CO2 mmol/L 27 27 27   BUN mg/dL 13 8 7   CREATININE mg/dL 0.53* 0.48* 0.50*   CALCIUM mg/dL 8.9 8.5 7.7*               Discharge instructions/Information to patient and family:   See after visit summary for information provided to patient and family. Discharge Medications:  See after visit summary for reconciled discharge medications provided to patient and family.       HPI per Mandie Christiansen MD " Nicole Hancock is a 59 y.o. female with PMHx of cholecystectomy, 1993 tubal ligation, who presents with epigastric/LUQ abdominal pain. Started 2 weeks ago. Describes pain as aching, nicolas, on/off, worsening. Never occurred before. No family history of colorectal cancer or IBD. Last colonoscopy in 3/2020 showed normal colon/cecum. Denies fever/chills, SOB, n/v. Last ate at 2pm. Last BM at 2pm today, diarrhea for last 2 days. VSS on RA."    Hospital Course:   Patient was admitted to the general surgery service was made NPO and scheduled for an open colectomy the following morning. On 11/12 the patient underwent an open right hemicolectomy with repair of ventral hernia x2 incisional hernia x1 and right lateral hepatic liver cyst excision with . Recovered from anesthesia in the PACU and then was transferred to back to her [address] room. POD #1 clear liquid diet, her Eldridge catheter was removed, she was out of bed and ambulating and using a PCA for pain control. POD #2 patient reported passing flatus, her clear liquid diet was advanced to a soft diet and she was transitioned off the PCA to oral pain medication. Physical therapy and Occupational Therapy were consulted recommending outpatient PT OT. POD #3 pain was well controlled. She was passing flatus and reported a small bowel movement. POD #4 patient was tolerating a diet, having bowel movements and passing flatus. Her pain was well controlled, outpatient PT and OT referrals were placed. Unfortunately no excepting home health care agency was able to be arranged. This this was discussed with family and case management. Patient was deemed appropriate for discharge home. Prior to discharge, the patient was given instructions for outpatient care and follow-up. The patient has been instructed to call w/ any questions, changes, or concerns for the medical condition.     For further details of the hospitalization, please refer to the medical record. Condition at Discharge: good     Provisions for Follow-Up Care:  See after visit summary for information related to follow-up care and any pertinent home health orders. Disposition: Home    Planned Readmission: No    Discharge Statement   I spent 30 minutes discharging the patient. This time was spent on the day of discharge. I had direct contact with the patient on the day of discharge. Additional documentation is required if more than 30 minutes were spent on discharge. 1900 JIN Braxton PA-C  11/16/2023      This text is generated with voice recognition software. There may be translation, syntax,  or grammatical errors. If you have any questions, please contact the dictating provider.

## 2023-11-16 NOTE — CASE MANAGEMENT
Case Management Discharge Planning Note    Patient name Estuardo Trent  Location S /S -65 MRN [id number]  : 1960 Date 2023       Current Admission Date: 2023  Current Admission Diagnosis:Intussusception of colon [facility]), Abdominal mass   Patient Active Problem List    Diagnosis Date Noted    Bell palsy 2023    Stroke-like symptoms 05/10/2023    Obesity 05/10/2023    Headache 05/10/2023    Abnormal Pap smear of cervix 2020    Pain of upper abdomen 2020    Liver cyst 2020    Screen for colon cancer 2020    Epigastric pain 2020    Screening for breast cancer 2020    Encounter for immunization 2020    Hypokalemia 2020    Depression 2020      LOS (days): 5  Geometric Mean LOS (GMLOS) (days):   Days to GMLOS:     OBJECTIVE:  Risk of Unplanned Readmission Score: 11.35         Current admission status: Inpatient   Preferred Pharmacy:   [address] [place] [address] [address], [address] [place] [address]  Phone: [phone] Fax: [phone]    Primary Care Provider: No primary care provider on file. Primary Insurance: BLUE CROSS  Secondary Insurance:     DISCHARGE DETAILS:    Discharge planning discussed with[de-identified] Patient and daughter  Freedom of Choice: Yes  Comments - Freedom of Choice: Discussed outpatient therapy at [facility]  CM contacted family/caregiver?: Yes  Were Treatment Team discharge recommendations reviewed with patient/caregiver?: Yes  Did patient/caregiver verbalize understanding of patient care needs?: Yes  Were patient/caregiver advised of the risks associated with not following Treatment Team discharge recommendations?: Yes    Contacts  Patient Contacts: Patient  Relationship to Patient[de-identified] Other (Comment)  Contact Method:  In Person  Reason/Outcome: Discharge Planning, Continuity of [facility]          Is the patient interested in [facility] at discharge?: No    DME Referral Provided  Referral made for DME?: Yes  DME referral completed for the following items[de-identified] Hilary Mcnamara (Serena RW was ordered)  DME Supplier Name[de-identified] [employer]    Other Referral/Resources/Interventions Provided:  Interventions: Outpatient PT, Outpatient OT, DME         Treatment Team Recommendation: Home with [address]  Discharge Destination Plan[de-identified] Home, Other (Outpatient PT/OT)  Transport at Discharge : Family                                         Patient is medically stable for DC. CM followed up with [address] and unfortunately at this time none have been able to confirm that they are par with patient's insurance. CM discussed with patient and her daughter the above. THey are okay with patient going home with no HHC and will plan on her going to outpatient PT/OT. CM discussed with daughter that a list will be sent along of therapy locations. Patient will need a serena RW for DC. This was ordered via [place] and was delivered to patient's room by [facility] liaison. Family will provide transportation home at DC. CM reviewed the availability of treatment team to discuss questions or concerns patient and/or family may have regarding  understanding medications and recognizing signs and symptoms once discharged. CM also encouraged patient to follow up with all recommended appointments after discharge. Patient advised of importance for patient and family to participate in managing patient's medical well being.

## 2023-11-16 NOTE — PHYSICAL THERAPY NOTE
PT TREATMENT     23 1207   PT Last Visit   PT Visit Date 23   Note Type   Note Type Treatment   Pain Assessment   Pain Assessment Tool 0-10   Pain Score 3   Pain Location/Orientation Location: Abdomen   Pain Onset/Description Onset: Ongoing;Frequency: Constant/Continuous   Effect of Pain on Daily Activities limits comfort and mobility   Patient's Stated Pain Goal No pain   Restrictions/Precautions   Other Precautions Pain   General   Chart Reviewed Yes   Family/Caregiver Present Yes  (pt's spouse)   Cognition   Overall Cognitive Status WFL   Arousal/Participation Cooperative   Attention Within functional limits   Orientation Level Oriented X4   Memory Within functional limits   Following Commands Follows all commands and directions without difficulty   Comments At least 2 pt identifiers including name and    Subjective   Subjective "ok"   Bed Mobility   Additional Comments Pt received OOB in chair   Transfers   Sit to Stand 6  Modified independent   Stand to Sit 6  Modified independent   Ambulation/Elevation   Gait pattern Step through pattern   Gait Assistance 6  Modified independent   Assistive Device Rolling walker;None   Distance 100 feet with RW and 75 feet without RW, both with change in direction and with rest break inbetween. Stair Management Assistance 6  Modified independent   Stair Management Technique Step to pattern; One rail L   Number of Stairs 4   Balance   Static Sitting Good   Static Standing Good  (w RW)   Ambulatory Good  (w RW)   Activity Tolerance   Activity Tolerance Patient limited by pain; Patient tolerated treatment well   Nurse Made Aware Yes, Beny Arias RN   Assessment   Problem List Decreased strength;Decreased endurance;Decreased mobility;Pain   Assessment Pt agreeable to PT session today. Pt is noted with significant improvement in trans, amb with and without the RW, balance, endurance and did well with gait on stairs.  Due to pain, pt prefers to cont to use the RW at this time. Pt's spouse present for entire session and informed PT he will be providing assist to pt when pt goes home. With cont admission, pt will cont to benefit from skilled PT services to assist pt in returning to her PLOF which was independent without an AD. Pt is approproate for Level III Minimum Resource Intensity on dc. The patient's AM-PAC Basic Mobility Inpatient Short Form Raw Score is 18. A Raw score of greater than 16 suggests the patient may benefit from discharge to home. Please also refer to the recommendation of the Physical Therapist for safe discharge planning. Goals   STG Expiration Date 11/23/23   Short Term Goal #1 Pt will: Perform rolling  and supine<>sit bed mobility tasks with Supervision to prepare for transfers and reposition in bed. Perform transfers with Supervision to improve ease of transfers. Perform ambulation with LRAD as needed for up to 150' with Supervision to improve gait quality. Perform up to 1 flight of stairs w/ railing +/- DME and w/no more than min A to return to home with SHARMIN and return to [facility] home. Plan   Treatment/Interventions ADL retraining;Functional transfer training;LE strengthening/ROM; Elevations; Therapeutic exercise; Endurance training;Patient/family training;Equipment eval/education; Bed mobility;Gait training; Compensatory technique education;Spoke to case management; Family   PT Frequency 3-5x/wk   Discharge Recommendation   Rehab Resource Intensity Level, PT III (Minimum Resource Intensity)   Equipment Recommended (S)  Walker  (Short RW)   AM-[facility] Basic Mobility Inpatient   Turning in Flat Bed Without Bedrails 3   Lying on Back to Sitting on Edge of Flat Bed Without Bedrails 3   Moving Bed to Chair 3   Standing Up From Chair Using Arms 3   Walk in Room 3   Climb 3-5 Stairs With Railing 3   Basic Mobility Inpatient Raw Score 18   Basic Mobility Standardized Score 41.05   Highest Level Of Mobility   JH-HLM Goal 6: Walk 10 steps or more   JH-HLM Achieved 7: Walk 25 feet or more   Education   Education Provided Mobility training;Assistive device   Patient Demonstrates acceptance/verbal understanding;Explanation/teachback used   End of Consult   Patient Position at End of Consult Bedside chair; All needs within reach   [employer] Insurance Number  Rosita Boggs, PT

## 2023-11-16 NOTE — PLAN OF CARE
Problem: PHYSICAL THERAPY ADULT  Goal: Performs mobility at highest level of function for planned discharge setting. See evaluation for individualized goals. Description: Treatment/Interventions: Functional transfer training, LE strengthening/ROM, Therapeutic exercise, Endurance training, Patient/family training, Equipment eval/education, Gait training, Bed mobility, Elevations, Spoke to nursing  Equipment Recommended: (S) Walker (Short rolling walker, however anticipate patient will make mobility progress by the time of medical discharge--we will continue to monitor)       See flowsheet documentation for full assessment, interventions and recommendations. Outcome: Progressing  Note: Prognosis: Good  Problem List: Decreased strength, Decreased endurance, Decreased mobility, Pain  Assessment: Pt agreeable to PT session today. Pt is noted with significant improvement in trans, amb with and without the RW, balance, endurance and did well with gait on stairs. Due to pain, pt prefers to cont to use the RW at this time. Pt's spouse present for entire session and informed PT he will be providing assist to pt when pt goes home. With cont admission, pt will cont to benefit from skilled PT services to assist pt in returning to her PLOF which was independent without an AD. Pt is approproate for Level III Minimum Resource Intensity on dc. Barriers to Discharge: Inaccessible home environment     Rehab Resource Intensity Level, PT: III (Minimum Resource Intensity)    See flowsheet documentation for full assessment.

## 2023-11-16 NOTE — PLAN OF CARE
Problem: PAIN - ADULT  Goal: Verbalizes/displays adequate comfort level or baseline comfort level  Description: Interventions:  - Encourage patient to monitor pain and request assistance  - Assess pain using appropriate pain scale  - Administer analgesics based on type and severity of pain and evaluate response  - Implement non-pharmacological measures as appropriate and evaluate response  - Consider cultural and social influences on pain and pain management  - Notify physician/advanced practitioner if interventions unsuccessful or patient reports new pain  Outcome: Progressing     Problem: INFECTION - ADULT  Goal: Absence or prevention of progression during hospitalization  Description: INTERVENTIONS:  - Assess and monitor for signs and symptoms of infection  - Monitor lab/diagnostic results  - Monitor all insertion sites, i.e. indwelling lines, tubes, and drains  - Monitor endotracheal if appropriate and nasal secretions for changes in amount and color  - [place] appropriate cooling/warming therapies per order  - Administer medications as ordered  - Instruct and encourage patient and family to use good hand hygiene technique  - Identify and instruct in appropriate isolation precautions for identified infection/condition  Outcome: Progressing  Goal: Absence of fever/infection during neutropenic period  Description: INTERVENTIONS:  - Monitor WBC    Outcome: Progressing     Problem: SAFETY ADULT  Goal: Patient will remain free of falls  Description: INTERVENTIONS:  - Educate patient/family on patient safety including physical limitations  - Instruct patient to call for assistance with activity   - Consult OT/PT to assist with strengthening/mobility   - Keep Call bell within reach  - Keep bed low and locked with side rails adjusted as appropriate  - Keep care items and personal belongings within reach  - Initiate and maintain comfort rounds  - Make Fall Risk Sign visible to staff  - Obtain necessary fall risk management equipment  - Apply yellow socks and bracelet for high fall risk patients  - Consider moving patient to room near nurses station  Outcome: Progressing  Goal: Maintain or return to baseline ADL function  Description: INTERVENTIONS:  -  Assess patient's ability to carry out ADLs; assess patient's baseline for ADL function and identify physical deficits which impact ability to perform ADLs (bathing, care of mouth/teeth, toileting, grooming, dressing, etc.)  - Assess/evaluate cause of self-care deficits   - Assess range of motion  - Assess patient's mobility; develop plan if impaired  - Assess patient's need for assistive devices and provide as appropriate  - Encourage maximum independence but intervene and supervise when necessary  - Involve family in performance of ADLs  - Assess for home care needs following discharge   - Consider OT consult to assist with ADL evaluation and planning for discharge  - Provide patient education as appropriate  Outcome: Progressing  Goal: Maintains/Returns to pre admission functional level  Description: INTERVENTIONS:  - Perform BMAT or MOVE assessment daily.   - Set and communicate daily mobility goal to care team and patient/family/caregiver.    - Collaborate with rehabilitation services on mobility goals if consulted  - Out of bed for toileting  - Record patient progress and toleration of activity level   Outcome: Progressing     Problem: DISCHARGE PLANNING  Goal: Discharge to home or other facility with appropriate resources  Description: INTERVENTIONS:  - Identify barriers to discharge w/patient and caregiver  - Arrange for needed discharge resources and transportation as appropriate  - Identify discharge learning needs (meds, wound care, etc.)  - Arrange for interpretive services to assist at discharge as needed  - Refer to Case Management Department for coordinating discharge planning if the patient needs post-hospital services based on physician/advanced practitioner order or complex needs related to functional status, cognitive ability, or social support system  Outcome: Progressing     Problem: Knowledge Deficit  Goal: Patient/family/caregiver demonstrates understanding of disease process, treatment plan, medications, and discharge instructions  Description: Complete learning assessment and assess knowledge base.   Interventions:  - Provide teaching at level of understanding  - Provide teaching via preferred learning methods  Outcome: Progressing     Problem: GASTROINTESTINAL - ADULT  Goal: Minimal or absence of nausea and/or vomiting  Description: INTERVENTIONS:  - Administer IV fluids if ordered to ensure adequate hydration  - Maintain NPO status until nausea and vomiting are resolved  - Nasogastric tube if ordered  - Administer ordered antiemetic medications as needed  - Provide nonpharmacologic comfort measures as appropriate  - Advance diet as tolerated, if ordered  - Consider nutrition services referral to assist patient with adequate nutrition and appropriate food choices  Outcome: Progressing  Goal: Maintains or returns to baseline bowel function  Description: INTERVENTIONS:  - Assess bowel function  - Encourage oral fluids to ensure adequate hydration  - Administer IV fluids if ordered to ensure adequate hydration  - Administer ordered medications as needed  - Encourage mobilization and activity  - Consider nutritional services referral to assist patient with adequate nutrition and appropriate food choices  Outcome: Progressing  Goal: Maintains adequate nutritional intake  Description: INTERVENTIONS:  - Monitor percentage of each meal consumed  - Identify factors contributing to decreased intake, treat as appropriate  - Assist with meals as needed  - Monitor I&O, weight, and lab values if indicated  - Obtain nutrition services referral as needed  Outcome: Progressing     Problem: MOBILITY - ADULT  Goal: Maintain or return to baseline ADL function  Description: INTERVENTIONS:  -  Assess patient's ability to carry out ADLs; assess patient's baseline for ADL function and identify physical deficits which impact ability to perform ADLs (bathing, care of mouth/teeth, toileting, grooming, dressing, etc.)  - Assess/evaluate cause of self-care deficits   - Assess range of motion  - Assess patient's mobility; develop plan if impaired  - Assess patient's need for assistive devices and provide as appropriate  - Encourage maximum independence but intervene and supervise when necessary  - Involve family in performance of ADLs  - Assess for home care needs following discharge   - Consider OT consult to assist with ADL evaluation and planning for discharge  - Provide patient education as appropriate  Outcome: Progressing  Goal: Maintains/Returns to pre admission functional level  Description: INTERVENTIONS:  - Perform BMAT or MOVE assessment daily.   - Set and communicate daily mobility goal to care team and patient/family/caregiver.    - Collaborate with rehabilitation services on mobility goals if consulted  - Out of bed for toileting  - Record patient progress and toleration of activity level   Outcome: Progressing

## 2023-11-17 PROCEDURE — 88360 TUMOR IMMUNOHISTOCHEM/MANUAL: CPT | Performed by: PATHOLOGY

## 2023-11-17 PROCEDURE — 88309 TISSUE EXAM BY PATHOLOGIST: CPT | Performed by: PATHOLOGY

## 2023-11-17 PROCEDURE — 88342 IMHCHEM/IMCYTCHM 1ST ANTB: CPT | Performed by: PATHOLOGY

## 2023-11-17 PROCEDURE — 88368 INSITU HYBRIDIZATION MANUAL: CPT | Performed by: PATHOLOGY

## 2023-11-17 PROCEDURE — 88304 TISSUE EXAM BY PATHOLOGIST: CPT | Performed by: PATHOLOGY

## 2023-11-17 PROCEDURE — 88365 INSITU HYBRIDIZATION (FISH): CPT | Performed by: PATHOLOGY

## 2023-11-17 PROCEDURE — 88341 IMHCHEM/IMCYTCHM EA ADD ANTB: CPT | Performed by: PATHOLOGY

## 2023-11-19 ENCOUNTER — TELEPHONE (OUTPATIENT)
Dept: SURGERY | Facility: CLINIC | Age: 63
End: 2023-11-19

## 2023-11-19 DIAGNOSIS — C85.90 LYMPHOMA (HCC): Primary | ICD-10-CM

## 2023-11-20 ENCOUNTER — TELEPHONE (OUTPATIENT)
Dept: HEMATOLOGY ONCOLOGY | Facility: CLINIC | Age: 63
End: 2023-11-20

## 2023-11-20 NOTE — TELEPHONE ENCOUNTER
I called Ricardo Brand in response to a referral that was received for patient to establish care with Medical Oncology. Outreach was made to schedule a consultation. Phone sapp and call went blank. Another attempt will be made to contact patient.

## 2023-11-21 LAB
DME PARACHUTE DELIVERY DATE ACTUAL: NORMAL
DME PARACHUTE DELIVERY DATE REQUESTED: NORMAL
DME PARACHUTE ITEM DESCRIPTION: NORMAL
DME PARACHUTE ORDER STATUS: NORMAL
DME PARACHUTE SUPPLIER NAME: NORMAL
DME PARACHUTE SUPPLIER PHONE: NORMAL

## 2023-11-22 NOTE — UTILIZATION REVIEW
NOTIFICATION OF ADMISSION DISCHARGE   This is a Notification of Discharge from [facility]. Please be advised that this patient has been discharge from our facility. Below you will find the admission and discharge date and time including the patient’s disposition. UTILIZATION REVIEW CONTACT:  Irving Barragan  Utilization   Network Utilization Review Department  Phone: [phone] x carefully listen to the prompts. All voicemails are confidential.  Email: [email or web address]     ADMISSION INFORMATION  PRESENTATION DATE: 11/11/2023  4:28 PM  OBERVATION ADMISSION DATE:   INPATIENT ADMISSION DATE: 11/11/23 10:25 PM   DISCHARGE DATE: 11/16/2023  4:36 PM   DISPOSITION:Home/Self Care    Network Utilization Review Department  ATTENTION: Please call with any questions or concerns to [phone] and carefully listen to the prompts so that you are directed to the right person. All voicemails are confidential.   For Discharge needs, contact Care Management DC Support Team at [phone] opt. 2  Send all requests for admission clinical reviews, approved or denied determinations and any other requests to dedicated fax number below belonging to the campus where the patient is receiving treatment.  List of dedicated fax numbers for the Facilities:  [place] DENIALS (Administrative/Medical Necessity) [phone]   DISCHARGE SUPPORT TEAM (Network) [phone] [facility] [phone]   [address] [facility] [address] [phone] [address] [facility] [phone]   [address] [place] [address]  [address]  [phone]

## 2023-12-04 ENCOUNTER — OFFICE VISIT (OUTPATIENT)
Dept: SURGERY | Facility: CLINIC | Age: 63
End: 2023-12-04

## 2023-12-04 DIAGNOSIS — C85.10 B-CELL LYMPHOMA (HCC): Primary | ICD-10-CM

## 2023-12-04 PROCEDURE — 99024 POSTOP FOLLOW-UP VISIT: CPT | Performed by: SURGERY

## 2023-12-04 NOTE — PROGRESS NOTES
Assessment/Plan: Patient recently underwent a right colon resection for diffuse B-cell lymphoma. In addition a right liver cyst was marsupialized. Postop recovery was unremarkable. She returns today for follow-up visit. Overall she feels well. She is advance her activities well. She has some urgency. This is expected post right hemicolectomy. Bran supplementation was recommended. This should improve with a few months of interval time. Incision is clean and healing well. Staples were removed. Abdomen is soft. All questions were answered. She was wondering about returning to work. I explained that oncology may be the limiting factor. Some patients are able to work through chemotherapy, while others cannot. I asked her to discuss this with her oncologist.  It is likely that she may have to try chemotherapy to see how she feels during treatment. All questions answered. There are no diagnoses linked to this encounter. Pathology: Reviewed with patient, all questions answered. Postoperative restrictions reviewed. All questions answered. ______________________________________________________  HPI: Patient presents post operatively. Laparotomy open with right colon resection 11/12/2023. Final Diagnosis  A. Colon, Right, Hemicolectomy:  -Diffuse large B-cell lymphoma, germinal center phenotype (see note). -Background segment of colon with 1 identified tubular adenoma, negative for high-grade dysplasia or carcinoma.  -Portion of attached terminal ileum with reactive lymphoid aggregates, negative for features of malignancy.  -Benign appendix with fibrous obliteration of the lumen, negative for features of malignancy.  -1 regional lymph node positive for involvement by B-cell lymphoma (see  note); 17 total lymph nodes present, negative for carcinoma. B.  Cyst, Liver, Excision:  -Benign cyst compatible with simple biliary cyst, negative for atypia or                    ROS:  General ROS: negative for - chills, fatigue, fever or night sweats, weight loss  Respiratory ROS: no cough, shortness of breath, or wheezing  Cardiovascular ROS: no chest pain or dyspnea on exertion  Genito-Urinary ROS: no dysuria, trouble voiding, or hematuria  Musculoskeletal ROS: negative for - gait disturbance, joint pain or muscle pain  Neurological ROS: no TIA or stroke symptoms  GI ROS: see HPI  Skin ROS: no new rashes or lesions   Lymphatic ROS: no new adenopathy noted by pt. GYN ROS: see HPI, no new GYN history or bleeding noted  Psy ROS: no new mental or behavioral disturbances         Patient Active Problem List   Diagnosis    Screen for colon cancer    Epigastric pain    Screening for breast cancer    Encounter for immunization    Hypokalemia    Depression    Pain of upper abdomen    Liver cyst    Abnormal Pap smear of cervix    Stroke-like symptoms    Obesity    Headache    Bell palsy       Allergies:  Patient has no known allergies.       Current Outpatient Medications:     acetaminophen (TYLENOL) 325 mg tablet, Take 1.5 tablets (488 mg total) by mouth every 6 (six) hours as needed for mild pain, Disp: , Rfl: 0    Calcium Carbonate (CALCIUM 500 PO), Take 1 tablet by mouth 2 (two) times a day, Disp: , Rfl:     cholecalciferol (VITAMIN D3) 1,000 units tablet, Take 1,000 Units by mouth daily, Disp: , Rfl:     cyanocobalamin (VITAMIN B-12) 1000 MCG tablet, Take 1,000 mcg by mouth daily, Disp: , Rfl:     multivitamin (THERAGRAN) TABS, Take 1 tablet by mouth daily, Disp: , Rfl:     Past Medical History:   Diagnosis Date    Abdominal pain     "burning pain" came to the ED    Renal disorder     Wears dentures     permanent upper denture    Weight loss     20 lb wt loss in 2-3 weeks d/t stomach issues       Past Surgical History:   Procedure Laterality Date    CHOLECYSTECTOMY      EXPLORATORY LAPAROTOMY W/ BOWEL RESECTION N/A 11/12/2023    Procedure: LAPAROTOMY OPEN W/ RIGHT COLON RESECTION;  Surgeon: Poly Keen Jose Alfredo Bran MD;  Location: [facility];  Service: General    TUBAL LIGATION      at 29        Family History   Problem Relation Age of Onset    Arthritis Mother     Heart attack Father     Coronary artery disease Father     Glaucoma Father     Heart disease Father         reports that she has never smoked. She has never used smokeless tobacco. She reports that she does not currently use alcohol. She reports that she does not use drugs.     PHYSICAL EXAM    LMP  (LMP Unknown)     General: normal, cooperative, no distress  Abdominal: soft, nondistended, or nontender  Incision: clean, dry, and intact and healing well      Yovanny Owusu MD    Date: 12/4/2023 Time: 2:50 PM

## 2023-12-12 ENCOUNTER — TELEPHONE (OUTPATIENT)
Dept: HEMATOLOGY ONCOLOGY | Facility: CLINIC | Age: 63
End: 2023-12-12

## 2023-12-12 ENCOUNTER — OFFICE VISIT (OUTPATIENT)
Dept: HEMATOLOGY ONCOLOGY | Facility: CLINIC | Age: 63
End: 2023-12-12
Payer: COMMERCIAL

## 2023-12-12 VITALS
WEIGHT: 165.4 LBS | TEMPERATURE: 97.8 F | OXYGEN SATURATION: 97 % | SYSTOLIC BLOOD PRESSURE: 118 MMHG | DIASTOLIC BLOOD PRESSURE: 72 MMHG | HEART RATE: 82 BPM | BODY MASS INDEX: 33.34 KG/M2 | HEIGHT: 59 IN | RESPIRATION RATE: 17 BRPM

## 2023-12-12 DIAGNOSIS — C83.33 DIFFUSE LARGE B-CELL LYMPHOMA OF INTRA-ABDOMINAL LYMPH NODES (HCC): Primary | ICD-10-CM

## 2023-12-12 DIAGNOSIS — C85.90 LYMPHOMA (HCC): ICD-10-CM

## 2023-12-12 PROCEDURE — 99244 OFF/OP CNSLTJ NEW/EST MOD 40: CPT | Performed by: INTERNAL MEDICINE

## 2023-12-12 NOTE — TELEPHONE ENCOUNTER
Confirmed with patient's daughter appt. On 1/19/24 with Stella Chavez at the [facility].

## 2023-12-12 NOTE — PROGRESS NOTES
Oncology Outpatient Consult Note  Berta Bartlett 59 y.o. female MRN: @ Encounter: [id number]        Date:  12/12/2023    HPI:   Berta Bartlett is 59 y.o. female with no PMH who presented to the hospital on 11/11/2023 with abdominal pain since 3 weeks and weight loss of 20 lbs over the same time. She did not have any fever, chills, night sweats, nausea, vomiting, diarrhea, constipation. CT abdomen at that time showed 1) Intussusception of the cecum and terminal ileum into the ascending colon secondary to a 5.1 x 2.7 cm soft tissue density, highly suspicious for an underlying mass; Several adjacent prominent mesenteric lymph nodes, which may be reactive, although metastatic disease is not entirely excluded. She under exploratory laparotomy and underwent right hemicolectomy and liver cyst excision. Biopsy of the removed mass showed DLBCL with + BCL 6 but negative myc and BCL-2. She has been doing well since the surgery and has no acute complaints a the moment. Personal history : does not smoke / drink / use illicit substance  Family history : no history of lymphoma / other cancers    Presenting today for initial consultation regarding further evaluation and management of DLBCL     Assessment/ Plan:      1. Diffuse large B-cell lymphoma of intra-abdominal lymph nodes (HCC)  Diagnosed after she presented with intussusception  Now s/p right coreen-colectomy    No family history of lymphoma / other cancer  No acute concerns at the moment; doing well after the surgery    Plan  Will do PET scan for final staging before starting treatment  Possibly R-CHOP treatment once PET scan available  Also obtain CBC, CMP, LD, immunoglobulin levels and chronic hepatitis panel  Follow up in 1 month     - NM PET CT skull base to mid thigh; Future  - CBC and differential; Future  - Comprehensive metabolic panel; Future  - LD,Blood; Future  - IgG, IgA, IgM; Future  - Immunoglobulin free LT chains blood;  Future  - Chronic Hepatitis Panel; Future  ________________________________________    Labs and imaging studies are reviewed by ordering provider once results are available. If there are findings that need immediate attention, you will be contacted when results available. Discussing results and the implication on your healthcare is best discussed in person at your follow-up visit. Past Medical History:   Diagnosis Date    Abdominal pain     "burning pain" came to the ED    Renal disorder     Wears dentures     permanent upper denture    Weight loss     20 lb wt loss in 2-3 weeks d/t stomach issues     Cancer Staging:  Cancer Staging   B-cell lymphoma ([address])  Staging form: Hodgkin and Non-Hodgkin Lymphoma, AJCC 8th Edition  - Clinical: Stage IIE (Diffuse large B-cell lymphoma) - Signed by Ayah Kumari MD on 12/12/2023  Stage prefix: Initial diagnosis      Molecular Testing:     Previous Hematologic/ Oncologic History:    Oncology History   B-cell lymphoma ([address])   12/4/2023 Initial Diagnosis    B-cell lymphoma ([address])     12/12/2023 -  Cancer Staged    Staging form: Hodgkin and Non-Hodgkin Lymphoma, AJCC 8th Edition  - Clinical: Stage IIE (Diffuse large B-cell lymphoma) - Signed by Ayah Kumari MD on 12/12/2023  Stage prefix: Initial diagnosis               Test Results:    Imaging: . No results found.     Labs:   Lab Results   Component Value Date    WBC 10.92 (H) 11/16/2023    HGB 10.8 (L) 11/16/2023    HCT 33.3 (L) 11/16/2023    MCV 92 11/16/2023     11/16/2023     Lab Results   Component Value Date    K 4.0 11/16/2023     11/16/2023    CO2 27 11/16/2023    BUN 13 11/16/2023    CREATININE 0.53 (L) 11/16/2023    GLUF 93 10/30/2021    CALCIUM 8.9 11/16/2023    AST 16 11/11/2023    ALT 14 11/11/2023    ALKPHOS 67 11/11/2023    EGFR 102 11/16/2023         No results found for: "SPEP", "UPEP"    No results found for: "PSA"    Lab Results   Component Value Date    CEA 0.9 11/11/2023       No results found for: ""    No results found for: "AFP"    No results found for: "IRON", "TIBC", "FERRITIN"    No results found for: "DOELOLQM03"        ROS:   - GENERAL: Negative for any nausea, vomiting, fevers, chills, or weight loss. - HEENT: Negative for any head/Neck trauma, pain, double/blurry vision, sinusitis, rhinitis, nose bleeding.  - CARDIAC: Negative for any chest pain, palpitation, Dyspnea on exertion, peripheral edema. - PULMONARY: Negative for any SOB, cough, wheezing.   - GASTROINTESTINAL: Negative for any abdominal pain, N/V/D/C, blood in stool.   - GENITOURINARY: Negative for any dysuria, hematuria, incontinence.  - NEUROLOGIC: Negative for any muscle weakness, numbness/tingling, memory changes. - MUSCULOSKELETAL: Negative for any joint pains/swelling, limited ROM. - INTEGUMENTARY: Negative for any rashes, cuts/ lesions.  - HEMATOLOGIC: Negative for any abnormal bruising, frequent infections or bleeding.     Active Problems:   Patient Active Problem List   Diagnosis    Screen for colon cancer    Epigastric pain    Screening for breast cancer    Encounter for immunization    Hypokalemia    Depression    Pain of upper abdomen    Liver cyst    Abnormal Pap smear of cervix    Stroke-like symptoms    Obesity    Headache    Bell palsy    B-cell lymphoma (HCC)       Past Medical History:   Past Medical History:   Diagnosis Date    Abdominal pain     "burning pain" came to the ED    Renal disorder     Wears dentures     permanent upper denture    Weight loss     20 lb wt loss in 2-3 weeks d/t stomach issues       Surgical History:   Past Surgical History:   Procedure Laterality Date    CHOLECYSTECTOMY      EXPLORATORY LAPAROTOMY W/ BOWEL RESECTION N/A 11/12/2023    Procedure: LAPAROTOMY OPEN W/ RIGHT COLON RESECTION;  Surgeon: Mariano Kern MD;  Location: [facility];  Service: General    TUBAL LIGATION      at 29        Family History:    Family History   Problem Relation Age of Onset    Arthritis Mother     Heart attack Father     Coronary artery disease Father     Glaucoma Father     Heart disease Father        Cancer-related family history is not on file. Social History:   Social History     Socioeconomic History    Marital status: Single     Spouse name: Not on file    Number of children: Not on file    Years of education: Not on file    Highest education level: Not on file   Occupational History    Not on file   Tobacco Use    Smoking status: Never    Smokeless tobacco: Never    Tobacco comments:     social    Vaping Use    Vaping Use: Never used   Substance and Sexual Activity    Alcohol use: Not Currently    Drug use: Never    Sexual activity: Yes     Partners: Male     Birth control/protection: Post-menopausal   Other Topics Concern    Not on file   Social History Narrative    Not on file     Social Determinants of Health     Financial Resource Strain: Not on file   Food Insecurity: No Food Insecurity (11/14/2023)    Hunger Vital Sign     Worried About Running Out of Food in the Last Year: Never true     Ran Out of Food in the Last Year: Never true   Transportation Needs: No Transportation Needs (11/14/2023)    PRAPARE - Transportation     Lack of Transportation (Medical): No     Lack of Transportation (Non-Medical):  No   Physical Activity: Not on file   Stress: Not on file   Social Connections: Not on file   Intimate Partner Violence: Not on file   Housing Stability: Low Risk  (11/14/2023)    Housing Stability Vital Sign     Unable to Pay for Housing in the Last Year: No     Number of Places Lived in the Last Year: 1     Unstable Housing in the Last Year: No       Current Medications:   Current Outpatient Medications   Medication Sig Dispense Refill    acetaminophen (TYLENOL) 325 mg tablet Take 1.5 tablets (488 mg total) by mouth every 6 (six) hours as needed for mild pain  0    Calcium Carbonate (CALCIUM 500 PO) Take 1 tablet by mouth 2 (two) times a day      cholecalciferol (VITAMIN D3) 1,000 units tablet Take 1,000 Units by mouth daily      cyanocobalamin (VITAMIN B-12) 1000 MCG tablet Take 1,000 mcg by mouth daily      multivitamin (THERAGRAN) TABS Take 1 tablet by mouth daily       No current facility-administered medications for this visit. Allergies: No Known Allergies      Physical Exam:  Body surface area is 1.7 meters squared. Wt Readings from Last 3 Encounters:   12/12/23 75 kg (165 lb 6.4 oz)   11/11/23 76 kg (167 lb 8.8 oz)   07/18/23 75.9 kg (167 lb 6.4 oz)        Temp Readings from Last 3 Encounters:   12/12/23 97.8 °F (36.6 °C) (Temporal)   11/16/23 98.1 °F (36.7 °C)   05/12/23 99.2 °F (37.3 °C)        BP Readings from Last 3 Encounters:   12/12/23 118/72   11/16/23 128/72   07/18/23 124/70         Pulse Readings from Last 3 Encounters:   12/12/23 82   11/16/23 69   07/18/23 74     @LASTSAO2(3)@    - GEN: Appears well, alert and oriented x 3, pleasant and cooperative, in no acute distress   - HEENT: Anicteric, mucous membranes moist, PERRL and EOMI   - NECK: No lymphadenopathy, JVD or carotid bruits   - HEART: RRR, normal S1 and S2, no murmurs, clicks, gallops or rubs   - LUNGS: Clear to auscultation bilaterally; no wheezes, rales, or rhonchi  - ABDOMEN: Normal bowel sounds, soft, no tenderness, no distention, no organomegaly or masses felt on exam.   - EXTREMITIES: Peripheral pulses normal; no clubbing, cyanosis, or edema  - NEURO: No focal findings, CN II-XII are grossly intact. - Musculoskeletal: 5/5 strength, normal ROM, no swollen or erythematous joints. - SKIN: Normal without suspicious lesions on exposed skin    Goals and Barriers:  Current Goal: Prolong Survival from Cancer. Barriers: None. Patient's Capacity to Self Care:  Patient is able to self care.     Jamee Aparicio MD  PGY-2, Internal Medicine  [place]

## 2024-01-12 ENCOUNTER — HOSPITAL ENCOUNTER (OUTPATIENT)
Dept: RADIOLOGY | Age: 64
Discharge: HOME | End: 2024-01-12
Payer: COMMERCIAL

## 2024-01-12 DIAGNOSIS — C83.33 DIFFUSE LARGE B-CELL LYMPHOMA OF INTRA-ABDOMINAL LYMPH NODES (HCC): ICD-10-CM

## 2024-01-12 DIAGNOSIS — C85.90 LYMPHOMA (HCC): ICD-10-CM

## 2024-01-12 LAB — GLUCOSE SERPL-MCNC: 82 MG/DL (ref 65–140)

## 2024-01-12 PROCEDURE — 82948 REAGENT STRIP/BLOOD GLUCOSE: CPT

## 2024-01-12 PROCEDURE — 78815 PET IMAGE W/CT SKULL-THIGH: CPT

## 2024-01-12 PROCEDURE — A9552 F18 FDG: HCPCS

## 2024-01-12 PROCEDURE — G1004 CDSM NDSC: HCPCS

## 2024-01-17 ENCOUNTER — TELEPHONE (OUTPATIENT)
Dept: HEMATOLOGY ONCOLOGY | Facility: CLINIC | Age: 64
End: 2024-01-17

## 2024-01-17 NOTE — TELEPHONE ENCOUNTER
Appointment Confirmation   Who are you speaking with? Patient & interprter [id number]   If it is not the patient, are they listed on an active communication consent form? N/A   Which provider is the appointment scheduled with?  Sravanthi Dumont PA-C   When is the appointment scheduled?  Please list date and time 1/19/24 @ 1:30pm   At which location is the appointment scheduled to take place? [place]   Did caller verbalize understanding of appointment details? Yes

## 2024-01-19 ENCOUNTER — OFFICE VISIT (OUTPATIENT)
Dept: HEMATOLOGY ONCOLOGY | Facility: CLINIC | Age: 64
End: 2024-01-19
Payer: COMMERCIAL

## 2024-01-19 ENCOUNTER — TELEPHONE (OUTPATIENT)
Dept: HEMATOLOGY ONCOLOGY | Facility: CLINIC | Age: 64
End: 2024-01-19

## 2024-01-19 VITALS
RESPIRATION RATE: 18 BRPM | SYSTOLIC BLOOD PRESSURE: 112 MMHG | HEIGHT: 59 IN | BODY MASS INDEX: 32.86 KG/M2 | OXYGEN SATURATION: 98 % | DIASTOLIC BLOOD PRESSURE: 74 MMHG | WEIGHT: 163 LBS | TEMPERATURE: 97.4 F | HEART RATE: 78 BPM

## 2024-01-19 DIAGNOSIS — C83.33 DIFFUSE LARGE B-CELL LYMPHOMA OF INTRA-ABDOMINAL LYMPH NODES (HCC): Primary | ICD-10-CM

## 2024-01-19 DIAGNOSIS — L98.9 SKIN LESION: ICD-10-CM

## 2024-01-19 PROCEDURE — 99215 OFFICE O/P EST HI 40 MIN: CPT | Performed by: PHYSICIAN ASSISTANT

## 2024-01-19 NOTE — TELEPHONE ENCOUNTER
LM for patient that a dermatology referral was placed into the system by Sravanthi Dumont. Office will contact them.

## 2024-01-19 NOTE — PROGRESS NOTES
Hematology/Oncology Outpatient Follow- up Note  Beatriz Campos 63 y.o. female MRN: @ Encounter: [id number]        Date:  1/19/2024      Assessment / Plan:    Diffuse large B-cell lymphoma, germinal center phenotype. BCL 6 positive but negative myc and BCL-2 involving colon and 1/51 lymph nodes.  S/P resection 11/12/23 when she presented with abdominal pain.      1/12/24 pet/ct- No focal tracer activity definitive for hypermetabolic malignancy however,  intense nodular FDG activity along the bilateral aspects of the enterocolonic anastomosis, surgery vs malignancy  Intensely FDG avid symmetric soft tissue prominence in the bilateral tonsillar regions. While the symmetry favors benign physiologic/inflammatory activity, there are additional nearby bilateral subcentimeter minimally FDG avid cervical lymph nodes  Asymmetric FDG avid cutaneous/subcutaneous activity involving the left facial cheek     Patient states she was aware of a mid cheek skin lesion for many years, it has not changed.  She does have heavy foundation on today, difficult to fully assess.  We discussed dermatology evaluation.  Referral made.    Dr. Gordon reviewed patient's case, pet/ct, Pathology discussed with Dr. Fernandez.    Given germinal center phenotype and possibility of HOLLY at time of resection, Observation recommended at this time.    Close interval f/u in 3 months with repeat labs requested.  Also discussed that there are labs that were ordered at her initial visit.  She is advised to have these drawn prior to her follow-up visit.                HPI:   Beatriz Campos is 63 y.o. 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    Past medical history of Bell's palsy    Presented to the hospital on 11/11/2023 with 3-week history of abdominal pain and weight loss of 20 lbs over the same time. She did not have any fever, chills, night sweats, nausea, vomiting, diarrhea, constipation.     CT abdomen at  that time showed 1) Intussusception of the cecum and terminal ileum into the ascending colon secondary to a 5.1 x 2.7 cm soft tissue density, highly suspicious for an underlying mass; Several adjacent prominent mesenteric lymph nodes, which may be reactive, although metastatic disease is not entirely excluded.     11/12/23 exploratory laparotomy, right hemicolectomy and liver cyst excision by Dr. Obrien.  Pathology from the right hemicolectomy specimen identified diffuse large B-cell lymphoma, germinal center phenotype. BCL 6 positive but negative myc and BCL-2.      1 regional lymph node positive for involvement by B-cell lymphoma.  17 total lymph nodes present.  No atypia or malignancy in the liver cyst excision    CBCD, quantitative immunoglobulins, LDH, CMP, chronic hepatitis panel were requested at her initial visit but not drawn    1/12/24 pet/ct - No focal tracer activity definitive for hypermetabolic malignancy. However, note is made of a few indeterminant hypermetabolic findings with underlying hypermetabolic malignancy at these sites not excluded including:     -Fairly intense nodular FDG activity along the bilateral aspects of the enterocolonic anastomosis where soft tissue prominence which is poorly characterized on low-dose unenhanced CT is noted. While findings could reflect inflammatory postsurgical changes, given the nodular activity, underlying hypermetabolic malignancy at the enterocolonic anastomosis cannot be excluded.     -Intensely FDG avid symmetric soft tissue prominence in the bilateral tonsillar regions. While the symmetry favors benign physiologic/inflammatory activity, there are additional nearby bilateral subcentimeter minimally FDG avid cervical lymph nodes and while the constellation of findings again could reflect benign inflammatory process, given history of lymphoma, lymphomatous involvement in the cervical region cannot be excluded.     -Asymmetric FDG avid cutaneous/subcutaneous  activity involving the left facial cheek; correlation with direct visualization is recommended to exclude cutaneous/subcutaneous malignancy in this location. This nonspecific finding could be inflammatory in etiology.      Interval History:  here with daughter who helps with translation.      Some abdominal discomfort.  No obstipation or diarrhea.  She has a good appetite.  Looking forward to returning back to work where she works as a .      Denies fevers, chills, sweats      Review of Systems - Oncology     Test Results:        Labs:   Lab Results   Component Value Date    HGB 10.8 (L) 11/16/2023    HCT 33.3 (L) 11/16/2023    MCV 92 11/16/2023     11/16/2023    WBC 10.92 (H) 11/16/2023    NRBC 0 11/16/2023     Lab Results   Component Value Date    K 4.0 11/16/2023     11/16/2023    CO2 27 11/16/2023    BUN 13 11/16/2023    CREATININE 0.53 (L) 11/16/2023    GLUF 93 10/30/2021    CALCIUM 8.9 11/16/2023    AST 16 11/11/2023    ALT 14 11/11/2023    ALKPHOS 67 11/11/2023    EGFR 102 11/16/2023           Imaging: NM PET CT skull base to mid thigh    Result Date: 1/15/2024  Narrative: PET/CT SCAN INDICATION: C83.33: Diffuse large b-cell lymphoma, intra-abdominal lymph nodes, restaging The following clinical information was obtained directly from [facility]: h/o DLBCL. s/p R hemicolectomy 11/12/23 (1/17 LNs +) and R liver cyst exc. (neg.). restaging post surgery. prior cholecystectomy. MODIFIER: PS COMPARISON: CT of abdomen and pelvis dated 11/11/2023. CT of chest, abdomen, and pelvis dated 5/29/2012. CELL TYPE:  diffuse large B-cell lymphoma, germinal center phenotype (bx: 11/12/23 R colon +) TECHNIQUE:   10.2 mCi F-18-FDG administered IV. Multiplanar attenuation corrected and non attenuation corrected PET images are available for interpretation, and contiguous, low dose, axial CT sections were obtained from the skull base through the femurs.  Intravenous contrast material was not utilized. This  examination, like all CT scans performed in the [facility], was performed utilizing techniques to minimize radiation dose exposure, including the use of iterative reconstruction and automated exposure control. Fasting serum glucose: 82 mg/dl FINDINGS: VISUALIZED BRAIN: No acute abnormalities are seen. HEAD/NECK: There is symmetric hypermetabolic soft tissue prominence in the bilateral tonsillar regions (for example image 42 series 12 with max SUV of 9.3 on the right and 12.0 on the left). There are nearby fairly symmetric minimally FDG avid subcentimeter left upper/level 2 cervical lymph nodes with max SUV of 2.4 on the right (image 44 of series 12) and max SUV of 2.5 on the left (for example image 43 of series 12). While the symmetry of findings in the tonsillar regions could reflect physiologic/inflammatory activity, given presence of mildly FDG avid bilateral small cervical lymph nodes consider correlation with direct visualization to exclude lymphomatous involvement. On image 34 of series 3 there is asymmetric cutaneous/subcutaneous FDG activity involving the left cheek where subtle subcutaneous fat infiltration is noted with max SUV of 3.1; while findings could reflect inflammatory activity, correlation with direct visualization is recommended to exclude cutaneous/subcutaneous malignancy. CT images: Unremarkable. CHEST: No FDG avid soft tissue lesions are seen. CT images: Atherosclerotic vascular calcifications are noted. ABDOMEN/PELVIS: Status post right hemicolectomy with enterocolonic anastomosis. Best seen on PET images 165/166 of series 12 are nodular foci of intense FDG activity about the bilateral aspects of the enterocolonic anastomosis with somewhat nodular appearance to the anastomosis which is difficult to definitively characterize on low-dose unenhanced CT. For example, on image 165 of series 3, hypermetabolic nodular soft tissue along the left paramedian aspect of the anastomosis  is approximately 2.5 cm with max SUV of 22.0. Findings are nonspecific and could reflect inflammatory postsurgical/posttreatment changes with underlying hypermetabolic malignancy about the enterocolonic anastomosis not excluded. More linear FDG activity extending along the midline of the anterior abdominal wall is favored to reflect postsurgical changes. Nonuniform, nonfocal FDG activity involving the rectum and rectosigmoid junction favored to be physiologic given the nonfocal nature. CT images: Cholecystectomy. Interval decrease in size of hypodense inferior right hepatic lobe cystic mass measuring 3.9 cm in maximal dimension, previously 7.7 cm; on the current exam this mass measures complex fluid but previously measured simple fluid. Tiny nonobstructing left renal calculus. OSSEOUS STRUCTURES: No FDG avid lesions are seen. CT images: No significant findings. FOR REFERENCE: LIVER SUV: 3.3 MEDIASTINAL BLOOD POOL SUV: 2.1     Impression: No focal tracer activity definitive for hypermetabolic malignancy. However, note is made of a few indeterminant hypermetabolic findings with underlying hypermetabolic malignancy at these sites not excluded including: -Fairly intense nodular FDG activity along the bilateral aspects of the enterocolonic anastomosis where soft tissue prominence which is poorly characterized on low-dose unenhanced CT is noted. While findings could reflect inflammatory postsurgical changes, given the nodular activity, underlying hypermetabolic malignancy at the enterocolonic anastomosis cannot be excluded. -Intensely FDG avid symmetric soft tissue prominence in the bilateral tonsillar regions. While the symmetry favors benign physiologic/inflammatory activity, there are additional nearby bilateral subcentimeter minimally FDG avid cervical lymph nodes and while the constellation of findings again could reflect benign inflammatory process, given history of lymphoma, lymphomatous involvement in the cervical  "region cannot be excluded. -Asymmetric FDG avid cutaneous/subcutaneous activity involving the left facial cheek; correlation with direct visualization is recommended to exclude cutaneous/subcutaneous malignancy in this location. This nonspecific finding could be inflammatory in etiology. Please see above for details and additional findings. Workstation performed: [id number]             Allergies: No Known Allergies  Current Medications: Reviewed  PMH/FH/SH:  Reviewed      Physical Exam:    There is no height or weight on file to calculate BSA.    Ht Readings from Last 3 Encounters:   12/12/23 4' 11\" (1.499 m)   11/11/23 4' 11\" (1.499 m)   07/18/23 4' 11\" (1.499 m)        Wt Readings from Last 3 Encounters:   12/12/23 75 kg (165 lb 6.4 oz)   11/11/23 76 kg (167 lb 8.8 oz)   07/18/23 75.9 kg (167 lb 6.4 oz)        Temp Readings from Last 3 Encounters:   12/12/23 97.8 °F (36.6 °C) (Temporal)   11/16/23 98.1 °F (36.7 °C)   05/12/23 99.2 °F (37.3 °C)        BP Readings from Last 3 Encounters:   12/12/23 118/72   11/16/23 128/72   07/18/23 124/70             Physical Exam  Vitals reviewed.   Constitutional:       General: She is not in acute distress.     Appearance: She is well-developed. She is not diaphoretic.   HENT:      Head: Normocephalic and atraumatic.   Eyes:      Conjunctiva/sclera: Conjunctivae normal.   Neck:      Trachea: No tracheal deviation.   Cardiovascular:      Rate and Rhythm: Normal rate and regular rhythm.      Heart sounds: No murmur heard.     No friction rub. No gallop.   Pulmonary:      Effort: Pulmonary effort is normal. No respiratory distress.      Breath sounds: Normal breath sounds. No wheezing or rales.   Chest:      Chest wall: No tenderness.   Abdominal:      General: There is no distension.      Palpations: Abdomen is soft.      Tenderness: There is abdominal tenderness.   Musculoskeletal:      Cervical back: Normal range of motion and neck supple.   Lymphadenopathy:      Cervical: No " cervical adenopathy.      Right cervical: No superficial or deep cervical adenopathy.     Left cervical: No superficial or deep cervical adenopathy.      Upper Body:      Right upper body: No supraclavicular or axillary adenopathy.      Left upper body: No supraclavicular or axillary adenopathy.      Lower Body: No right inguinal adenopathy. No left inguinal adenopathy.   Skin:     General: Skin is warm and dry.      Coloration: Skin is not pale.      Findings: No erythema.      Comments: Left mid cheek lesion raised.  Patient has heavy foundation, difficult to appreciate   Neurological:      General: No focal deficit present.      Mental Status: She is alert and oriented to person, place, and time.   Psychiatric:         Behavior: Behavior normal.         Thought Content: Thought content normal.         Judgment: Judgment normal.         ECO      Emergency Contacts:    Extended Emergency Contact Information  Primary Emergency Contact: Tiera Paige  Address: [address] [place] [address] [facility]  Home Phone: [phone]  Mobile Phone: [phone]  Relation: Daughter  Secondary Emergency Contact: Caroline Jimenez  Address: [address] [place] [address] [facility]  Home Phone: [phone]  Mobile Phone: [phone]  Relation: Daughter

## 2024-01-23 ENCOUNTER — OFFICE VISIT (OUTPATIENT)
Dept: SURGERY | Facility: CLINIC | Age: 64
End: 2024-01-23

## 2024-01-23 DIAGNOSIS — C83.33 DIFFUSE LARGE B-CELL LYMPHOMA OF INTRA-ABDOMINAL LYMPH NODES (HCC): Primary | ICD-10-CM

## 2024-01-23 PROCEDURE — 99024 POSTOP FOLLOW-UP VISIT: CPT | Performed by: SURGERY

## 2024-01-23 NOTE — PROGRESS NOTES
Assessment/Plan: Patient is status post laparotomy and right colon resection for diffuse B-cell lymphoma.  She returns for follow-up visit overall she feels well.  Her incision is clean healing well.  There is no evidence for infection.  Abdomen is soft.  She is in touch with oncology concerning PET scanning and additional chemotherapy.  All questions were answered.    There are no diagnoses linked to this encounter.    Pathology: Reviewed with patient, all questions answered.       Postoperative restrictions reviewed. All questions answered.       ______________________________________________________  HPI: Patient presents for follow up.  S/P laparotomy open with right colon resection 11/12/2023.               ROS:  General ROS: negative for - chills, fatigue, fever or night sweats, weight loss  Respiratory ROS: no cough, shortness of breath, or wheezing  Cardiovascular ROS: no chest pain or dyspnea on exertion  Genito-Urinary ROS: no dysuria, trouble voiding, or hematuria  Musculoskeletal ROS: negative for - gait disturbance, joint pain or muscle pain  Neurological ROS: no TIA or stroke symptoms  GI ROS: see HPI  Skin ROS: no new rashes or lesions   Lymphatic ROS: no new adenopathy noted by pt.   GYN ROS: see HPI, no new GYN history or bleeding noted  Psy ROS: no new mental or behavioral disturbances         Patient Active Problem List   Diagnosis    Screen for colon cancer    Epigastric pain    Screening for breast cancer    Encounter for immunization    Hypokalemia    Depression    Pain of upper abdomen    Liver cyst    Abnormal Pap smear of cervix    Stroke-like symptoms    Obesity    Headache    Bell palsy    B-cell lymphoma (HCC)    Skin lesion       Allergies:  Patient has no known allergies.      Current Outpatient Medications:     acetaminophen (TYLENOL) 325 mg tablet, Take 1.5 tablets (488 mg total) by mouth every 6 (six) hours as needed for mild pain, Disp: , Rfl: 0    Calcium Carbonate (CALCIUM 500  "PO), Take 1 tablet by mouth 2 (two) times a day, Disp: , Rfl:     cholecalciferol (VITAMIN D3) 1,000 units tablet, Take 1,000 Units by mouth daily, Disp: , Rfl:     cyanocobalamin (VITAMIN B-12) 1000 MCG tablet, Take 1,000 mcg by mouth daily, Disp: , Rfl:     magnesium citrate solution, Take 296 mL by mouth once, Disp: , Rfl:     Melatonin 10 MG SUBL, Place under the tongue, Disp: , Rfl:     multivitamin (THERAGRAN) TABS, Take 1 tablet by mouth daily, Disp: , Rfl:     Past Medical History:   Diagnosis Date    Abdominal pain     \"burning pain\" came to the ED    Colon cancer (HCC) 11/12/23    Renal disorder     Wears dentures     permanent upper denture    Weight loss     20 lb wt loss in 2-3 weeks d/t stomach issues       Past Surgical History:   Procedure Laterality Date    CHOLECYSTECTOMY      EXPLORATORY LAPAROTOMY W/ BOWEL RESECTION N/A 11/12/2023    Procedure: LAPAROTOMY OPEN W/ RIGHT COLON RESECTION;  Surgeon: Juan Obrien MD;  Location: [facility];  Service: General    TUBAL LIGATION      at 36        Family History   Problem Relation Age of Onset    Arthritis Mother     Heart attack Father     Coronary artery disease Father     Glaucoma Father     Heart disease Father         reports that she has never smoked. She has never used smokeless tobacco. She reports that she does not currently use alcohol. She reports that she does not use drugs.    PHYSICAL EXAM    LMP  (LMP Unknown)     General: normal, cooperative, no distress  Abdominal: soft, nondistended, or nontender  Incision: clean, dry, and intact and healing well      Juan Obrien MD    Date: 1/23/2024 Time: 10:52 AM   "

## 2024-01-24 ENCOUNTER — TELEPHONE (OUTPATIENT)
Dept: HEMATOLOGY ONCOLOGY | Facility: CLINIC | Age: 64
End: 2024-01-24

## 2024-01-24 NOTE — TELEPHONE ENCOUNTER
Medical Records Request   Who are you speaking with? Child   If it is not the patient, are they listed on an active communication consent form? Yes   What is the purpose of this call? Requesting medical records   What records are being requested? Reports   Details/ Additional Info Everything from first visit, notes, labs, imaging   Which provider does the patient see? Dr. Gordon   Fax Number   Person's name (Attention:)   [phone] disability claim number [id number]   Facility call back number [phone]   Patient call back number [phone]

## 2024-02-21 PROBLEM — Z12.39 SCREENING FOR BREAST CANCER: Status: RESOLVED | Noted: 2020-02-26 | Resolved: 2024-02-21

## 2024-02-21 PROBLEM — Z12.11 SCREEN FOR COLON CANCER: Status: RESOLVED | Noted: 2020-02-26 | Resolved: 2024-02-21

## 2024-04-10 ENCOUNTER — HOSPITAL ENCOUNTER (OUTPATIENT)
Dept: RADIOLOGY | Age: 64
Discharge: HOME | End: 2024-04-10
Payer: COMMERCIAL

## 2024-04-10 DIAGNOSIS — C83.33 DIFFUSE LARGE B-CELL LYMPHOMA OF INTRA-ABDOMINAL LYMPH NODES (HCC): ICD-10-CM

## 2024-04-10 LAB — GLUCOSE SERPL-MCNC: 104 MG/DL (ref 65–140)

## 2024-04-10 PROCEDURE — 78815 PET IMAGE W/CT SKULL-THIGH: CPT

## 2024-04-10 PROCEDURE — A9552 F18 FDG: HCPCS

## 2024-04-10 PROCEDURE — 82948 REAGENT STRIP/BLOOD GLUCOSE: CPT

## 2024-04-10 PROCEDURE — G1004 CDSM NDSC: HCPCS

## 2024-04-17 ENCOUNTER — OFFICE VISIT (OUTPATIENT)
Dept: HEMATOLOGY ONCOLOGY | Facility: CLINIC | Age: 64
End: 2024-04-17
Payer: COMMERCIAL

## 2024-04-17 DIAGNOSIS — C83.33 DIFFUSE LARGE B-CELL LYMPHOMA OF INTRA-ABDOMINAL LYMPH NODES (HCC): Primary | ICD-10-CM

## 2024-04-17 PROCEDURE — 99214 OFFICE O/P EST MOD 30 MIN: CPT | Performed by: INTERNAL MEDICINE

## 2024-04-17 NOTE — PROGRESS NOTES
Hematology Outpatient Follow - Up Note  Beatriz Campos 64 y.o. 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 negative for Bcl-2, c-Myc involving the colon with 1/51 positive lymph node status post resection on 11/2023 when she presented with abdominal pain    PET scan on 1/2024 showed no evidence of focal tracer activity suggestive of lymphoma with intense activity along the bilateral aspect of the anastomosis    Repeat PET scan in April 2024 showed decreased activity at the persistent FDG activity along the anastomosis site, decreased other activities might be related to postsurgical scar formation versus partially residual lymphoma    The patient has no symptoms suggestive of lymphoma such as weight loss, fever, night sweats, constipation, nausea, vomiting or skin rash    After we reviewed the 2 scans together we decided to watch and observe and follow-up in 3 months with PET scan, CBC, CMP, LDH        Labs and imaging studies are reviewed by ordering provider once results are available. If there are findings that need immediate attention, you will be contacted when results available.   Discussing results and the implication on your healthcare is best discussed in person at your follow-up visit.       HPI:  Beatriz Campos is 64 y.o. 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     Past medical history of Bell's palsy     Presented to the hospital on 11/11/2023 with 3-week history of abdominal pain and weight loss of 20 lbs over the same time. She did not have any fever, chills, night sweats, nausea, vomiting, diarrhea, constipation.      CT abdomen at that time showed 1) Intussusception of the cecum and terminal ileum into the ascending colon secondary to a 5.1 x 2.7 cm soft tissue density, highly suspicious for an underlying mass; Several adjacent  prominent mesenteric lymph nodes, which may be reactive, although metastatic disease is not entirely excluded.      11/12/23 exploratory laparotomy, right hemicolectomy and liver cyst excision by Dr. Obrien.  Pathology from the right hemicolectomy specimen identified diffuse large B-cell lymphoma, germinal center phenotype. BCL 6 positive but negative myc and BCL-2.       1 regional lymph node positive for involvement by B-cell lymphoma.  17 total lymph nodes present.  No atypia or malignancy in the liver cyst excision     CBCD, quantitative immunoglobulins, LDH, CMP, chronic hepatitis panel were requested at her initial visit but not drawn     1/12/24 pet/ct - No focal tracer activity definitive for hypermetabolic malignancy. However, note is made of a few indeterminant hypermetabolic findings with underlying hypermetabolic malignancy at these sites not excluded including:     -Fairly intense nodular FDG activity along the bilateral aspects of the enterocolonic anastomosis where soft tissue prominence which is poorly characterized on low-dose unenhanced CT is noted. While findings could reflect inflammatory postsurgical changes, given the nodular activity, underlying hypermetabolic malignancy at the enterocolonic anastomosis cannot be excluded.     -Intensely FDG avid symmetric soft tissue prominence in the bilateral tonsillar regions. While the symmetry favors benign physiologic/inflammatory activity, there are additional nearby bilateral subcentimeter minimally FDG avid cervical lymph nodes and while the constellation of findings again could reflect benign inflammatory process, given history of lymphoma, lymphomatous involvement in the cervical region cannot be excluded.     -Asymmetric FDG avid cutaneous/subcutaneous activity involving the left facial cheek; correlation with direct visualization is recommended to exclude cutaneous/subcutaneous malignancy in this location. This nonspecific finding could be  inflammatory in etiology.       Interval History:        Previous Treatment:         Test Results:    Imaging: NM PET CT skull base to mid thigh    Addendum Date: 4/10/2024 Addendum:   ADDENDUM: The modifier for this examination is PS +    Result Date: 4/10/2024  Narrative: PET/CT SCAN INDICATION: C83.33: Diffuse large b-cell lymphoma, intra-abdominal lymph nodes restaging/surveillance examination. MODIFIER: PS COMPARISON: PET/CT 1/12/2024, CT abdomen/pelvis 11/11/2023 CELL TYPE:  diffuse large B-cell lymphoma, germinal center phenotype (bx- Rt colon 11/12/23) TECHNIQUE:   10.8 mCi F-18-FDG administered IV. Multiplanar attenuation corrected and non attenuation corrected PET images are available for interpretation, and contiguous, low dose, axial CT sections were obtained from the skull base through the femurs.  Intravenous contrast material was not utilized. This examination, like all CT scans performed in the [facility], was performed utilizing techniques to minimize radiation dose exposure, including the use of iterative reconstruction and automated exposure control. Fasting serum glucose: 104 mg/dl FINDINGS: VISUALIZED BRAIN: No acute abnormalities are seen. HEAD/NECK: Bilateral tonsillar prominence. Decreased left tonsil SUV 9.5 (series 3 image 42), previously 12. Right tonsil SUV 7.2 (series 3 image 41), previously 9.3. Decreased minimally FDG-avid subcentimeter right cervical lymph nodes with SUV 2 (series 3 image 41), previously 2.4. Similarly, left cervical lymph nodes with SUV 2.1 (series 3 image 40), previously 2.5. Right submandibular minimally avid subcentimeter node with SUV 1.6 (series 3 image 52), previously 0.9, likely reactive. CT images: Unremarkable. CHEST: No FDG avid soft tissue lesions are seen. CT images: Unremarkable. ABDOMEN/PELVIS: Partially diminished size and FDG avidity at the distal enterocolonic anastomosis with SUV 12 (series 3 image 160), previously 22. Diminishing  bilobed appearance. Because this has been persistent, favor underlying partially improved hypermetabolic malignancy, although postsurgical/posttreatment inflammatory changes still possible . Decreased FDG avidity of the midline abdominal wall at the postsurgical site with SUV 2.8 (series 3 image 179), previously 3.7, consistent with improving postsurgical inflammation. Decreased FDG avidity of the rectosigmoid colon of 2.6 (series 3 image 199), previously 5.2, most likely physiologic. Decreased FDG avidity of the rectum with 3.7 SUV (series 3 image 214), previously 4.2, most likely physiologic. CT images: Cholecystectomy. Right hemicolectomy. Stable non-FDG avid inferior right hepatic lobe cystic mass measuring 3.2 x 2.4 cm (series 3 image 150). OSSEOUS STRUCTURES: No FDG avid lesions are seen. CT images: No acute findings. Degenerative disc disease, L5-S1 The Deauville score is 4 Liver SUV max is 3.3 Mediastinal blood pool SUV max is 2.4     Impression: 1. Partially improved FDG avidity at the distal enterocolonic anastomosis with SUV 12, previously 22. Region of abnormal activity has partially diminished. Because this has been persistent, favor partially improved underlying hypermetabolic malignancy, although postsurgical/posttreatment inflammatory changes still possible. 2. Decreased mild FDG avidity of the midline abdominal wall postsurgical site, consistent with improving postsurgical inflammation. Decreased mild FDG avidity at the rectosigmoid colon and rectum, most likely physiologic. 3. There is no progressive FDG avid cervical adenopathy 4. Decreased bilateral tonsillar FDG avidity most pronounced on the left with 9.5 SUV, previously 12. Minimally FDG avid subcentimeter right cervical lymph nodes have also decreased. Resident: VENTURA MAJANO I, the attending radiologist, have reviewed the images and agree with the final report above. Workstation performed: [id number]       Labs:   Lab Results   Component  "Value Date    WBC 10.92 (H) 11/16/2023    HGB 10.8 (L) 11/16/2023    HCT 33.3 (L) 11/16/2023    MCV 92 11/16/2023     11/16/2023     Lab Results   Component Value Date    K 4.0 11/16/2023     11/16/2023    CO2 27 11/16/2023    BUN 13 11/16/2023    CREATININE 0.53 (L) 11/16/2023    GLUF 93 10/30/2021    CALCIUM 8.9 11/16/2023    AST 16 11/11/2023    ALT 14 11/11/2023    ALKPHOS 67 11/11/2023    EGFR 102 11/16/2023       No results found for: \"IRON\", \"TIBC\", \"FERRITIN\"    No results found for: \"[id number]\"      ROS: Review of Systems   Constitutional:  Negative for appetite change, chills, diaphoresis, fatigue and unexpected weight change.   HENT:   Negative for mouth sores, nosebleeds, sore throat, trouble swallowing and voice change.    Eyes:  Negative for eye problems and icterus.   Respiratory:  Negative for chest tightness, cough, hemoptysis and wheezing.    Cardiovascular:  Negative for chest pain, leg swelling and palpitations.   Gastrointestinal:  Positive for abdominal pain (Intermittent decrease in intensity from before does not wake her up at night). Negative for abdominal distention, blood in stool, constipation, diarrhea, nausea and vomiting.   Endocrine: Negative for hot flashes.   Genitourinary:  Negative for bladder incontinence, difficulty urinating, dyspareunia, dysuria and frequency.    Musculoskeletal:  Negative for arthralgias, back pain, gait problem, neck pain and neck stiffness.   Skin:  Negative for itching and rash.   Neurological:  Negative for dizziness, gait problem, headaches, numbness, seizures and speech difficulty.   Hematological:  Negative for adenopathy. Does not bruise/bleed easily.   Psychiatric/Behavioral:  Negative for decreased concentration, depression, sleep disturbance and suicidal ideas. The patient is not nervous/anxious.           Current Medications: Reviewed  Allergies: Reviewed  PMH/FH/SH:  Reviewed      Physical Exam:    There is no height or weight on " file to calculate BSA.    Wt Readings from Last 3 Encounters:   24 73.9 kg (163 lb)   23 75 kg (165 lb 6.4 oz)   23 76 kg (167 lb 8.8 oz)        Temp Readings from Last 3 Encounters:   24 (!) 97.4 °F (36.3 °C)   23 97.8 °F (36.6 °C) (Temporal)   23 98.1 °F (36.7 °C)        BP Readings from Last 3 Encounters:   24 112/74   23 118/72   23 128/72         Pulse Readings from Last 3 Encounters:   24 78   23 82   23 69        Physical Exam  Vitals reviewed.   Constitutional:       General: She is not in acute distress.     Appearance: She is well-developed. She is not diaphoretic.   HENT:      Head: Normocephalic and atraumatic.   Eyes:      Conjunctiva/sclera: Conjunctivae normal.   Neck:      Trachea: No tracheal deviation.   Cardiovascular:      Rate and Rhythm: Normal rate and regular rhythm.      Heart sounds: No murmur heard.     No friction rub. No gallop.   Pulmonary:      Effort: Pulmonary effort is normal. No respiratory distress.      Breath sounds: Normal breath sounds. No wheezing or rales.   Chest:      Chest wall: No tenderness.   Abdominal:      General: Abdomen is flat. There is no distension.      Palpations: Abdomen is soft. There is no hepatomegaly, splenomegaly or mass.      Tenderness: There is no abdominal tenderness.      Comments: Midline scar without any evidence of irritation, oozing, infection or nodules   Musculoskeletal:      Cervical back: Normal range of motion and neck supple.   Lymphadenopathy:      Cervical: No cervical adenopathy.   Skin:     General: Skin is warm and dry.      Coloration: Skin is not pale.      Findings: No erythema.   Neurological:      Mental Status: She is alert and oriented to person, place, and time.   Psychiatric:         Behavior: Behavior normal.         Thought Content: Thought content normal.         Judgment: Judgment normal.         ECO    Goals and Barriers:  Current Goal: Minimize  effects of disease.   Barriers: None.      Patient's Capacity to Self Care:  Patient is able to self care.    Code [facility]: [facility]

## 2024-05-09 ENCOUNTER — HOSPITAL ENCOUNTER (EMERGENCY)
Facility: HOSPITAL | Age: 64
Discharge: HOME | End: 2024-05-09
Attending: EMERGENCY MEDICINE
Payer: COMMERCIAL

## 2024-05-09 VITALS
DIASTOLIC BLOOD PRESSURE: 70 MMHG | HEART RATE: 71 BPM | TEMPERATURE: 99.1 F | SYSTOLIC BLOOD PRESSURE: 137 MMHG | RESPIRATION RATE: 20 BRPM | OXYGEN SATURATION: 99 %

## 2024-05-09 DIAGNOSIS — M54.42 ACUTE LOW BACK PAIN WITH LEFT-SIDED SCIATICA: Primary | ICD-10-CM

## 2024-05-09 PROCEDURE — 96372 THER/PROPH/DIAG INJ SC/IM: CPT

## 2024-05-09 PROCEDURE — 99283 EMERGENCY DEPT VISIT LOW MDM: CPT

## 2024-05-09 PROCEDURE — 99284 EMERGENCY DEPT VISIT MOD MDM: CPT

## 2024-05-09 RX ORDER — KETOROLAC TROMETHAMINE 30 MG/ML
15 INJECTION, SOLUTION INTRAMUSCULAR; INTRAVENOUS ONCE
Status: COMPLETED | OUTPATIENT
Start: 2024-05-09 | End: 2024-05-09

## 2024-05-09 RX ORDER — KETOROLAC TROMETHAMINE 30 MG/ML
15 INJECTION, SOLUTION INTRAMUSCULAR; INTRAVENOUS ONCE
Status: DISCONTINUED | OUTPATIENT
Start: 2024-05-09 | End: 2024-05-09

## 2024-05-09 RX ORDER — METHOCARBAMOL 500 MG/1
500 TABLET, FILM COATED ORAL 2 TIMES DAILY
Qty: 6 TABLET | Refills: 0 | Status: SHIPPED | OUTPATIENT
Start: 2024-05-09 | End: 2024-05-12

## 2024-05-09 RX ORDER — NAPROXEN 500 MG/1
500 TABLET ORAL 2 TIMES DAILY WITH MEALS
Qty: 6 TABLET | Refills: 0 | Status: SHIPPED | OUTPATIENT
Start: 2024-05-09 | End: 2024-05-12

## 2024-05-09 RX ORDER — LIDOCAINE 50 MG/G
1 PATCH TOPICAL ONCE
Status: DISCONTINUED | OUTPATIENT
Start: 2024-05-09 | End: 2024-05-09 | Stop reason: HOSPADM

## 2024-05-09 RX ORDER — LIDOCAINE 50 MG/G
1 PATCH TOPICAL EVERY 24 HOURS
Qty: 15 PATCH | Refills: 0 | Status: SHIPPED | OUTPATIENT
Start: 2024-05-09

## 2024-05-09 RX ORDER — ACETAMINOPHEN 325 MG/1
650 TABLET ORAL ONCE
Status: COMPLETED | OUTPATIENT
Start: 2024-05-09 | End: 2024-05-09

## 2024-05-09 RX ADMIN — ACETAMINOPHEN 650 MG: 325 TABLET ORAL at 19:58

## 2024-05-09 RX ADMIN — KETOROLAC TROMETHAMINE 15 MG: 30 INJECTION, SOLUTION INTRAMUSCULAR; INTRAVENOUS at 20:06

## 2024-05-09 RX ADMIN — LIDOCAINE 1 PATCH: 50 PATCH CUTANEOUS at 19:58

## 2024-05-09 NOTE — ED PROVIDER NOTES
History  Chief Complaint   Patient presents with    Back Pain     Pt reports left lower back pain x1 week, denies injury/trauma, denies  symptoms     Beatriz Campos is a 64 y.o. female with a PMH of diffuse large B-cell lymphoma, osteoporosis presenting to the ED on May 9, 2024 with back pain. Patient endorses that she has had left lower back pain for the last 2 weeks. She states that the pain is lateral to the left of her spine and radiates into her left glute. She states that she gets some numbness and tingling into her buttock as well. She denies any injury or trauma to her back that she can recall. She states the pain is worse when she is sitting. She has been taking advil for pain and states that the advil does relieve the pain however it comes back once the medication has worn off. She also endorses having a wound on her left side that she noticed when she was rubbing her back. She is unsure how or when she got it but believes it may be a bug bite as it was itchy. Patient denies urinary retention, incontinence, saddle anesthesia, difficulty having bowl movements, dysuria, urgency, frequency, abdominal pain, fevers, chills or any other complaints at this time.          used: Yes        Prior to Admission Medications   Prescriptions Last Dose Informant Patient Reported? Taking?   Calcium Carbonate (CALCIUM 500 PO)   Yes No   Sig: Take 1 tablet by mouth 2 (two) times a day   Melatonin 10 MG SUBL   Yes No   Sig: Place under the tongue   acetaminophen (TYLENOL) 325 mg tablet   No No   Sig: Take 1.5 tablets (488 mg total) by mouth every 6 (six) hours as needed for mild pain   cholecalciferol (VITAMIN D3) 1,000 units tablet   Yes No   Sig: Take 1,000 Units by mouth daily   cyanocobalamin (VITAMIN B-12) 1000 MCG tablet   Yes No   Sig: Take 1,000 mcg by mouth daily   magnesium citrate solution   Yes No   Sig: Take 296 mL by mouth once   multivitamin (THERAGRAN) TABS   Yes No   Sig: Take 1 tablet  "by mouth daily      Facility-Administered Medications: None       Past Medical History:   Diagnosis Date    Abdominal pain     \"burning pain\" came to the ED    Colon cancer (HCC) 11/12/23    Renal disorder     Wears dentures     permanent upper denture    Weight loss     20 lb wt loss in 2-3 weeks d/t stomach issues       Past Surgical History:   Procedure Laterality Date    CHOLECYSTECTOMY      EXPLORATORY LAPAROTOMY W/ BOWEL RESECTION N/A 11/12/2023    Procedure: LAPAROTOMY OPEN W/ RIGHT COLON RESECTION;  Surgeon: Juan Obrien MD;  Location: [facility];  Service: General    TUBAL LIGATION      at 36        Family History   Problem Relation Age of Onset    Arthritis Mother     Heart attack Father     Coronary artery disease Father     Glaucoma Father     Heart disease Father      I have reviewed and agree with the history as documented.    E-Cigarette/Vaping    E-Cigarette Use Never User      E-Cigarette/Vaping Substances     Social History     Tobacco Use    Smoking status: Never    Smokeless tobacco: Never    Tobacco comments:     social    Vaping Use    Vaping status: Never Used   Substance Use Topics    Alcohol use: Not Currently    Drug use: Never       Review of Systems   Constitutional:  Negative for chills and fever.   HENT:  Negative for ear pain and sore throat.    Respiratory:  Negative for cough and shortness of breath.    Cardiovascular:  Negative for chest pain and palpitations.   Gastrointestinal:  Negative for abdominal pain, nausea and vomiting.   Genitourinary:  Negative for difficulty urinating, dysuria, flank pain, frequency, hematuria and urgency.   Musculoskeletal:  Positive for back pain. Negative for arthralgias, gait problem, joint swelling, myalgias, neck pain and neck stiffness.   Skin:  Positive for wound. Negative for color change and rash.   Neurological:  Negative for dizziness, seizures, syncope, weakness, light-headedness, numbness and headaches.   All other systems reviewed " and are negative.      Physical Exam  Physical Exam  Vitals and nursing note reviewed.   Constitutional:       General: She is not in acute distress.     Appearance: Normal appearance. She is normal weight. She is not ill-appearing, toxic-appearing or diaphoretic.   HENT:      Head: Normocephalic and atraumatic.      Right Ear: External ear normal.      Left Ear: External ear normal.      Nose: Nose normal. No congestion or rhinorrhea.      Mouth/Throat:      Mouth: Mucous membranes are moist.   Eyes:      General: No scleral icterus.        Right eye: No discharge.         Left eye: No discharge.      Extraocular Movements: Extraocular movements intact.      Conjunctiva/sclera: Conjunctivae normal.   Cardiovascular:      Rate and Rhythm: Normal rate and regular rhythm.      Heart sounds: Normal heart sounds. No murmur heard.     No friction rub. No gallop.   Pulmonary:      Effort: Pulmonary effort is normal. No respiratory distress.      Breath sounds: Normal breath sounds. No wheezing, rhonchi or rales.   Abdominal:      General: Abdomen is flat.   Musculoskeletal:         General: No signs of injury.      Cervical back: Normal, normal range of motion and neck supple. No rigidity or tenderness.      Thoracic back: Normal.      Lumbar back: Tenderness present. No swelling, edema, deformity, signs of trauma, lacerations, spasms or bony tenderness. Decreased range of motion. Negative right straight leg raise test and negative left straight leg raise test.        Back:       Comments: Decreased lateral flexion on patient's left side secondary to pain   Skin:     General: Skin is warm.      Capillary Refill: Capillary refill takes less than 2 seconds.      Coloration: Skin is not pale.      Findings: No erythema.   Neurological:      General: No focal deficit present.      Mental Status: She is alert and oriented to person, place, and time. Mental status is at baseline.      Motor: No weakness.      Gait: Gait normal.    Psychiatric:         Mood and Affect: Mood normal.         Behavior: Behavior normal.         Vital Signs  ED Triage Vitals [05/09/24 1728]   Temperature Pulse Respirations Blood Pressure SpO2   99.1 °F (37.3 °C) 71 20 137/70 99 %      Temp Source Heart Rate Source Patient Position - Orthostatic VS BP Location FiO2 (%)   Oral Monitor Sitting Right arm --      Pain Score       --           Vitals:    05/09/24 1728   BP: 137/70   Pulse: 71   Patient Position - Orthostatic VS: Sitting         Visual Acuity      ED Medications  Medications   lidocaine (LIDODERM) 5 % patch 1 patch (1 patch Topical Medication Applied 5/9/24 1958)   acetaminophen (TYLENOL) tablet 650 mg (650 mg Oral Given 5/9/24 1958)   ketorolac (TORADOL) injection 15 mg (15 mg Intramuscular Given 5/9/24 2006)       Diagnostic Studies  Results Reviewed       None                   No orders to display              Procedures  Procedures         ED Course                                             Medical Decision Making  Patient seen and examined noted to have acute low back pain with left-sided sciatica.  Patient states that this pain is relieved temporarily with Advil however resumes once the medication is worn off.  No red flag symptoms.  She states that the pain is worse when sitting and that she is tender to palpation.  She denies any injury or trauma to the area.  Lateral flexion is limited due to pain.  No urinary symptoms.  Patient was given Toradol, Tylenol and a lidocaine patch here which relieved her symptoms.  She was given a prescription for naproxen, Robaxin and lidocaine patches at home.  Patient drove herself here so was unable to give her Robaxin here today.  Strict return precautions were given which patient expressed her understanding of.  She plans to follow-up with the comprehensive spine program.    Differential diagnosis includes but is not limited to sciatica, herniated disc, arthritis, spinal stenosis, strain, sprain, fracture,  "cauda equina syndrome, epidural abscess, transverse myelitis, AAA.       Patient appears well, is nontoxic appearing, she expresses understanding and agrees with plan of care at this time.  In light of this patient would benefit from outpatient management.    Patient was rx'd: mentioned above    Patient at time of discharge well-appearing in no acute distress, all questions answered. Patient agreeable to plan.  Patient's vitals, lab/imaging results, diagnosis, and treatment plan were discussed with the patient. All new/changed medications were discussed with patient, specifically, route of administration, how often and when to take, and where they can be picked up. Strict return precautions as well as close follow up with PCP was discussed with the patient and the patient was agreeable to my recommendations. Patient verbally acknowledged understanding of the above communications. Strict return precautions were provided. All labs reviewed and utilized in the medical decision making process (if labs were ordered). Portions of the record may have been created with voice recognition software.  Occasional wrong word or \"sound a like\" substitutions may have occurred due to the inherent limitations of voice recognition software.  Read the chart carefully and recognize, using context, where substitutions have occurred.      Risk  OTC drugs.  Prescription drug management.             Disposition  Final diagnoses:   Acute low back pain with left-sided sciatica     Time reflects when diagnosis was documented in both MDM as applicable and the Disposition within this note       Time User Action Codes Description Comment    5/9/2024  8:22 PM Mar Gusman Add [M54.42] Acute low back pain with left-sided sciatica           ED Disposition       ED Disposition   Discharge    Condition   Stable    Date/Time   Thu May 9, 2024  8:21 PM    Comment   Beatriz Campos discharge to home/self care.                   Follow-up Information  "      Follow up With Specialties Details Why Contact Info Additional Information    Clint Moore Jr., DO Family Medicine   [address] [facility] [address]  [phone]       [facility] Emergency Medicine  If symptoms worsen, As needed [address] [facility] [address]  [phone] [facility], [address], [place], [address]            Discharge Medication List as of 5/9/2024  8:25 PM        START taking these medications    Details   lidocaine (LIDODERM) 5 % Apply 1 patch topically over 12 hours every 24 hours Remove & Discard patch within 12 hours or as directed by MD, Starting Thu 5/9/2024, Normal      methocarbamol (ROBAXIN) 500 mg tablet Take 1 tablet (500 mg total) by mouth 2 (two) times a day for 3 days, Starting Thu 5/9/2024, Until Sun 5/12/2024, Normal      naproxen (Naprosyn) 500 mg tablet Take 1 tablet (500 mg total) by mouth 2 (two) times a day with meals for 3 days, Starting Thu 5/9/2024, Until Sun 5/12/2024, Normal           CONTINUE these medications which have NOT CHANGED    Details   acetaminophen (TYLENOL) 325 mg tablet Take 1.5 tablets (488 mg total) by mouth every 6 (six) hours as needed for mild pain, Starting Thu 11/16/2023, OTC      Calcium Carbonate (CALCIUM 500 PO) Take 1 tablet by mouth 2 (two) times a day, Historical Med      cholecalciferol (VITAMIN D3) 1,000 units tablet Take 1,000 Units by mouth daily, Historical Med      cyanocobalamin (VITAMIN B-12) 1000 MCG tablet Take 1,000 mcg by mouth daily, Historical Med      magnesium citrate solution Take 296 mL by mouth once, Historical Med      Melatonin 10 MG SUBL Place under the tongue, Historical Med      multivitamin (THERAGRAN) TABS Take 1 tablet by mouth daily, Historical Med                 PDMP Review         Value Time User    PDMP Reviewed  Yes 11/16/2023  2:58 PM Ann Lay PA-C            ED  Provider  Electronically Signed by             Mar Gusman PA-C  05/09/24 0464

## 2024-05-10 ENCOUNTER — TELEPHONE (OUTPATIENT)
Dept: PHYSICAL THERAPY | Facility: OTHER | Age: 64
End: 2024-05-10

## 2024-05-10 NOTE — DISCHARGE INSTRUCTIONS
Can apply a lidocaine patch to the area of pain every 12 hours.  Do not apply heat over the patch as it can burn your skin.  Can take Robaxin twice a day.  This is a muscle relaxer and can make you sleepy or drowsy.  Please do not drive when you have taken these.  Can also take naproxen twice a day.  This medication is similar to ibuprofen so do not take it with ibuprofen.  Placed a referral for you to be seen by the comprehensive spine program who will help you manage your back pain.  Please follow-up with your primary care provider as well.  Return to the emergency department if you develop numbness in your groin, lose control of your bladder or have difficulty urinating.

## 2024-05-10 NOTE — TELEPHONE ENCOUNTER
Call placed to the patient per [facility] referral.    Tiera (pt's daughter) she is in communication consent and she is the main .    V/M left for patient to call back. Phone number and hours of business provided.     This is the 1st attempt to reach the patient. Will defer referral per protocol.

## 2024-06-07 ENCOUNTER — TELEPHONE (OUTPATIENT)
Age: 64
End: 2024-06-07

## 2024-06-07 NOTE — TELEPHONE ENCOUNTER
Verónica from [facility] called in regards to medical records from 11/17/23 to present. Confirmed there are records. Provided her with telephone number and fax number to medical records. Gave her the main hospital address.

## 2024-06-19 ENCOUNTER — TELEPHONE (OUTPATIENT)
Dept: SURGERY | Facility: CLINIC | Age: 64
End: 2024-06-19

## 2024-06-19 NOTE — TELEPHONE ENCOUNTER
Forwarded copy of records request to medical records department. Completed form included and faxed back to [employer]

## 2024-07-26 ENCOUNTER — HOSPITAL ENCOUNTER (OUTPATIENT)
Dept: RADIOLOGY | Age: 64
Discharge: HOME | End: 2024-07-26

## 2024-07-26 DIAGNOSIS — C83.33 DIFFUSE LARGE B-CELL LYMPHOMA OF INTRA-ABDOMINAL LYMPH NODES (HCC): ICD-10-CM

## 2024-08-22 ENCOUNTER — HOSPITAL ENCOUNTER (OUTPATIENT)
Dept: RADIOLOGY | Age: 64
Discharge: HOME | End: 2024-08-22
Payer: COMMERCIAL

## 2024-08-22 LAB — GLUCOSE SERPL-MCNC: 90 MG/DL (ref 65–140)

## 2024-08-22 PROCEDURE — 82948 REAGENT STRIP/BLOOD GLUCOSE: CPT

## 2024-08-22 PROCEDURE — A9552 F18 FDG: HCPCS

## 2024-08-22 PROCEDURE — 78815 PET IMAGE W/CT SKULL-THIGH: CPT

## 2024-09-18 ENCOUNTER — RA CDI HCC (OUTPATIENT)
Dept: OTHER | Facility: HOSPITAL | Age: 64
End: 2024-09-18

## 2024-09-26 ENCOUNTER — OFFICE VISIT (OUTPATIENT)
Dept: HEMATOLOGY ONCOLOGY | Facility: CLINIC | Age: 64
End: 2024-09-26
Payer: COMMERCIAL

## 2024-09-26 ENCOUNTER — TELEPHONE (OUTPATIENT)
Age: 64
End: 2024-09-26

## 2024-09-26 VITALS
OXYGEN SATURATION: 99 % | SYSTOLIC BLOOD PRESSURE: 130 MMHG | BODY MASS INDEX: 33.97 KG/M2 | HEART RATE: 80 BPM | TEMPERATURE: 98.1 F | DIASTOLIC BLOOD PRESSURE: 84 MMHG | HEIGHT: 59 IN | RESPIRATION RATE: 17 BRPM | WEIGHT: 168.5 LBS

## 2024-09-26 DIAGNOSIS — C83.33 DIFFUSE LARGE B-CELL LYMPHOMA OF INTRA-ABDOMINAL LYMPH NODES (HCC): Primary | ICD-10-CM

## 2024-09-26 DIAGNOSIS — R94.8 ABNORMAL POSITRON EMISSION TOMOGRAPHY (PET) SCAN: ICD-10-CM

## 2024-09-26 PROCEDURE — 99214 OFFICE O/P EST MOD 30 MIN: CPT | Performed by: PHYSICIAN ASSISTANT

## 2024-09-26 NOTE — PROGRESS NOTES
"    Hematology/Oncology Outpatient Follow- up Note  Beatriz Campos 64 y.o. 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 for Bcl-2, c-Myc involving the colon with 1/51 positive lymph node status post resection on 11/2023 when she presented with abdominal pain     PET scan on 1/2024 showed no evidence of focal tracer activity suggestive of lymphoma with intense activity along the bilateral aspect of the anastomosis     Repeat PET scan in April 2024 showed decreased activity at the persistent FDG activity along the anastomosis site, decreased other activities might be related to postsurgical scar formation versus partially residual lymphoma     4/2024  The patient has no symptoms suggestive of lymphoma such as weight loss, fever, night sweats, constipation, nausea, vomiting or skin rash     Pet/ct 8/22/24-  Decreased focal FDG uptake near the proximal colonic anastomosis. There is heterogeneous moderate FDG uptake remaining favor postsurgical changes/physiologic activity.     2. On pet/ct 4/24 and 8/2024  \"Persistent fairly intense activity at the level of the bilateral palatine and lingual tonsils similar to the prior study. This is nonspecific and could be inflammatory but lymphomatous infiltration is not excluded. If this does represent lymphoma this would represent a Deauville score of 5. Otherwise overall findings would suggest a Deauville score of 1.    Patient does report that occasionally she feels that she has some trouble swallowing.  Otherwise denies any pain, frequent infections.    Will arrange for evaluation by ENT.      If negative for any lymphomatous involvement, will arrange for follow-up in 6 months with repeat PET/CT and labs.              HPI:  Beatriz Campos is 64 y.o. 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     Past " medical history of Bell's palsy     Presented to the hospital on 11/11/2023 with 3-week history of abdominal pain and weight loss of 20 lbs over the same time. She did not have any fever, chills, night sweats, nausea, vomiting, diarrhea, constipation.      CT abdomen at that time showed 1) Intussusception of the cecum and terminal ileum into the ascending colon secondary to a 5.1 x 2.7 cm soft tissue density, highly suspicious for an underlying mass; Several adjacent prominent mesenteric lymph nodes, which may be reactive, although metastatic disease is not entirely excluded.      11/12/23 exploratory laparotomy, right hemicolectomy and liver cyst excision by Dr. Obrien.  Pathology from the right hemicolectomy specimen identified diffuse large B-cell lymphoma, germinal center phenotype. BCL 6 positive but negative myc and BCL-2.      1 regional lymph node positive for involvement by B-cell lymphoma. 17 total lymph nodes present.  No atypia or malignancy in the liver cyst excision     CBCD, quantitative immunoglobulins, LDH, CMP, chronic hepatitis panel were requested at her initial visit but not drawn     1/12/24 pet/ct - No focal tracer activity definitive for hypermetabolic malignancy. However, note is made of a few indeterminant hypermetabolic findings with underlying hypermetabolic malignancy at these sites not excluded including:  Fairly intense nodular FDG activity along the bilateral aspects of the enterocolonic anastomosis where soft tissue prominence which is poorly characterized on low-dose unenhanced CT is noted. While findings could reflect inflammatory postsurgical changes, given the nodular activity, underlying hypermetabolic malignancy at the enterocolonic anastomosis cannot be excluded.     -Intensely FDG avid symmetric soft tissue prominence in the bilateral tonsillar regions. While the symmetry favors benign physiologic/inflammatory activity, there are additional nearby bilateral subcentimeter  minimally FDG avid cervical lymph nodes and while the constellation of findings again could reflect benign inflammatory process, given history of lymphoma, lymphomatous involvement in the cervical region cannot be excluded.     -Asymmetric FDG avid cutaneous/subcutaneous activity involving the left facial cheek; correlation with direct visualization is recommended to exclude cutaneous/subcutaneous malignancy in this location. This nonspecific finding could be inflammatory in etiology.       4/2024  The patient has no symptoms suggestive of lymphoma such as weight loss, fever, night sweats, constipation, nausea, vomiting or skin rash    Pet/ct 8/22/24-  Decreased focal FDG uptake near the proximal colonic anastomosis. There is heterogeneous moderate FDG uptake remaining favor postsurgical changes/physiologic activity.     2. Persistent fairly intense activity at the level of the bilateral palatine and lingual tonsils similar to the prior study. This is nonspecific and could be inflammatory but lymphomatous infiltration is not excluded. If this does represent lymphoma thiswould represent a Deauville score of 5. Otherwise overall findings would suggest a Deauville score of 1.      Review of Systems   Constitutional:  Negative for appetite change, chills, diaphoresis, fatigue, fever and unexpected weight change.   HENT:   Negative for mouth sores, nosebleeds, sore throat, tinnitus and voice change.    Eyes:  Negative for eye problems.   Respiratory:  Negative for chest tightness, cough, shortness of breath and wheezing.    Cardiovascular:  Negative for chest pain, leg swelling and palpitations.   Gastrointestinal:  Negative for abdominal distention, abdominal pain, blood in stool, constipation, diarrhea, nausea, rectal pain and vomiting.   Endocrine: Negative for hot flashes.   Genitourinary: Negative.     Musculoskeletal:  Negative for gait problem and myalgias.   Skin:  Negative for itching and rash.   Neurological:   "Negative for dizziness, gait problem, headaches, light-headedness and numbness.   Hematological:  Negative for adenopathy.   Psychiatric/Behavioral:  Negative for confusion and sleep disturbance. The patient is not nervous/anxious.         Test Results:        Labs:   Lab Results   Component Value Date    HGB 10.8 (L) 11/16/2023    HCT 33.3 (L) 11/16/2023    MCV 92 11/16/2023     11/16/2023    WBC 10.92 (H) 11/16/2023    NRBC 0 11/16/2023     Lab Results   Component Value Date    K 4.5 07/16/2024     07/16/2024    CO2 26 07/16/2024    BUN 12 07/16/2024    CREATININE 0.58 07/16/2024    GLUF 93 10/30/2021    CALCIUM 9.0 07/16/2024    AST 13 07/16/2024    ALT 9 07/16/2024    ALKPHOS 75 07/16/2024    EGFR 101 07/16/2024           Imaging: No results found.          Allergies: No Known Allergies  Current Medications: Reviewed  PMH/FH/SH:  Reviewed      Physical Exam:    There is no height or weight on file to calculate BSA.    Ht Readings from Last 3 Encounters:   01/19/24 4' 11\" (1.499 m)   12/12/23 4' 11\" (1.499 m)   11/11/23 4' 11\" (1.499 m)        Wt Readings from Last 3 Encounters:   01/19/24 73.9 kg (163 lb)   12/12/23 75 kg (165 lb 6.4 oz)   11/11/23 76 kg (167 lb 8.8 oz)        Temp Readings from Last 3 Encounters:   05/09/24 99.1 °F (37.3 °C) (Oral)   01/19/24 (!) 97.4 °F (36.3 °C)   12/12/23 97.8 °F (36.6 °C) (Temporal)        BP Readings from Last 3 Encounters:   05/09/24 137/70   01/19/24 112/74   12/12/23 118/72             Physical Exam  Vitals reviewed.   Constitutional:       General: She is not in acute distress.     Appearance: She is well-developed. She is not diaphoretic.   HENT:      Head: Normocephalic and atraumatic.   Eyes:      Conjunctiva/sclera: Conjunctivae normal.   Neck:      Trachea: No tracheal deviation.   Cardiovascular:      Rate and Rhythm: Normal rate and regular rhythm.      Heart sounds: No murmur heard.     No friction rub. No gallop.   Pulmonary:      Effort: " Pulmonary effort is normal. No respiratory distress.      Breath sounds: Normal breath sounds. No wheezing or rales.   Chest:      Chest wall: No tenderness.   Abdominal:      General: There is no distension.      Palpations: Abdomen is soft.      Tenderness: There is no abdominal tenderness.   Musculoskeletal:      Cervical back: Normal range of motion and neck supple.   Lymphadenopathy:      Cervical: No cervical adenopathy.   Skin:     General: Skin is warm and dry.      Coloration: Skin is not pale.      Findings: No erythema.   Neurological:      Mental Status: She is alert. Mental status is at baseline.   Psychiatric:         Behavior: Behavior normal.         Thought Content: Thought content normal.         ECO      Emergency Contacts:    Extended Emergency Contact Information  Primary Emergency Contact: Tiera Paige  Address: [address] [place] [id number] [place]  Home Phone: [phone]  Mobile Phone: [phone]  Relation: Daughter  Secondary Emergency Contact: Caroline Jimenez  Address: [address] [place] [address] [facility]  Home Phone: [phone]  Mobile Phone: [phone]  Relation: Daughter

## 2024-09-26 NOTE — TELEPHONE ENCOUNTER
Patient's daughter called in to schedule ENT appt with Dr Spence from referral, was advised that since she has private insurance she could see Dr Spence at [facility], gave contact info for [facility]

## 2024-10-31 ENCOUNTER — HOSPITAL ENCOUNTER (EMERGENCY)
Facility: HOSPITAL | Age: 64
Discharge: HOME | End: 2024-10-31
Attending: EMERGENCY MEDICINE
Payer: COMMERCIAL

## 2024-10-31 ENCOUNTER — APPOINTMENT (EMERGENCY)
Dept: RADIOLOGY | Facility: HOSPITAL | Age: 64
End: 2024-10-31
Payer: COMMERCIAL

## 2024-10-31 VITALS
TEMPERATURE: 98.2 F | OXYGEN SATURATION: 99 % | SYSTOLIC BLOOD PRESSURE: 158 MMHG | DIASTOLIC BLOOD PRESSURE: 68 MMHG | RESPIRATION RATE: 16 BRPM | HEART RATE: 81 BPM

## 2024-10-31 DIAGNOSIS — R22.0 LIP SWELLING: ICD-10-CM

## 2024-10-31 DIAGNOSIS — R07.9 CHEST PAIN: ICD-10-CM

## 2024-10-31 DIAGNOSIS — L50.9 URTICARIA: Primary | ICD-10-CM

## 2024-10-31 DIAGNOSIS — T78.40XA ALLERGIC REACTION, INITIAL ENCOUNTER: ICD-10-CM

## 2024-10-31 LAB
2HR DELTA HS TROPONIN: 0 NG/L
ALBUMIN SERPL BCG-MCNC: 4 G/DL (ref 3.5–5)
ALP SERPL-CCNC: 70 U/L (ref 34–104)
ALT SERPL W P-5'-P-CCNC: 9 U/L (ref 7–52)
ANION GAP SERPL CALCULATED.3IONS-SCNC: 4 MMOL/L (ref 4–13)
AST SERPL W P-5'-P-CCNC: 12 U/L (ref 13–39)
BASOPHILS # BLD AUTO: 0.03 THOUSANDS/ΜL (ref 0–0.1)
BASOPHILS NFR BLD AUTO: 0 % (ref 0–1)
BILIRUB SERPL-MCNC: 0.26 MG/DL (ref 0.2–1)
BUN SERPL-MCNC: 13 MG/DL (ref 5–25)
CALCIUM SERPL-MCNC: 8.9 MG/DL (ref 8.4–10.2)
CARDIAC TROPONIN I PNL SERPL HS: 5 NG/L
CARDIAC TROPONIN I PNL SERPL HS: 5 NG/L
CHLORIDE SERPL-SCNC: 108 MMOL/L (ref 96–108)
CO2 SERPL-SCNC: 28 MMOL/L (ref 21–32)
CREAT SERPL-MCNC: 0.58 MG/DL (ref 0.6–1.3)
EOSINOPHIL # BLD AUTO: 0.17 THOUSAND/ΜL (ref 0–0.61)
EOSINOPHIL NFR BLD AUTO: 2 % (ref 0–6)
ERYTHROCYTE [DISTWIDTH] IN BLOOD BY AUTOMATED COUNT: 25.2 % (ref 11.6–15.1)
GFR SERPL CREATININE-BSD FRML MDRD: 98 ML/MIN/1.73SQ M
GLUCOSE SERPL-MCNC: 99 MG/DL (ref 65–140)
HCT VFR BLD AUTO: 31.2 % (ref 34.8–46.1)
HGB BLD-MCNC: 9.2 G/DL (ref 11.5–15.4)
IMM GRANULOCYTES # BLD AUTO: 0.02 THOUSAND/UL (ref 0–0.2)
IMM GRANULOCYTES NFR BLD AUTO: 0 % (ref 0–2)
LYMPHOCYTES # BLD AUTO: 3.47 THOUSANDS/ΜL (ref 0.6–4.47)
LYMPHOCYTES NFR BLD AUTO: 41 % (ref 14–44)
MCH RBC QN AUTO: 20.9 PG (ref 26.8–34.3)
MCHC RBC AUTO-ENTMCNC: 29.5 G/DL (ref 31.4–37.4)
MCV RBC AUTO: 71 FL (ref 82–98)
MONOCYTES # BLD AUTO: 0.64 THOUSAND/ΜL (ref 0.17–1.22)
MONOCYTES NFR BLD AUTO: 8 % (ref 4–12)
NEUTROPHILS # BLD AUTO: 4.14 THOUSANDS/ΜL (ref 1.85–7.62)
NEUTS SEG NFR BLD AUTO: 49 % (ref 43–75)
NRBC BLD AUTO-RTO: 0 /100 WBCS
PLATELET # BLD AUTO: 353 THOUSANDS/UL (ref 149–390)
PMV BLD AUTO: 9.5 FL (ref 8.9–12.7)
POTASSIUM SERPL-SCNC: 3.7 MMOL/L (ref 3.5–5.3)
PROT SERPL-MCNC: 7.5 G/DL (ref 6.4–8.4)
RBC # BLD AUTO: 4.41 MILLION/UL (ref 3.81–5.12)
SODIUM SERPL-SCNC: 140 MMOL/L (ref 135–147)
WBC # BLD AUTO: 8.47 THOUSAND/UL (ref 4.31–10.16)

## 2024-10-31 PROCEDURE — 84484 ASSAY OF TROPONIN QUANT: CPT

## 2024-10-31 PROCEDURE — 85025 COMPLETE CBC W/AUTO DIFF WBC: CPT

## 2024-10-31 PROCEDURE — 99285 EMERGENCY DEPT VISIT HI MDM: CPT | Performed by: EMERGENCY MEDICINE

## 2024-10-31 PROCEDURE — 93005 ELECTROCARDIOGRAM TRACING: CPT

## 2024-10-31 PROCEDURE — 99283 EMERGENCY DEPT VISIT LOW MDM: CPT

## 2024-10-31 PROCEDURE — 71045 X-RAY EXAM CHEST 1 VIEW: CPT

## 2024-10-31 PROCEDURE — 80053 COMPREHEN METABOLIC PANEL: CPT

## 2024-10-31 PROCEDURE — 36415 COLL VENOUS BLD VENIPUNCTURE: CPT

## 2024-10-31 RX ORDER — FAMOTIDINE 20 MG/1
20 TABLET, FILM COATED ORAL ONCE
Status: COMPLETED | OUTPATIENT
Start: 2024-10-31 | End: 2024-10-31

## 2024-10-31 RX ORDER — PREDNISONE 20 MG/1
40 TABLET ORAL DAILY
Qty: 6 TABLET | Refills: 0 | Status: SHIPPED | OUTPATIENT
Start: 2024-10-31 | End: 2024-11-03

## 2024-10-31 RX ORDER — PREDNISONE 20 MG/1
20 TABLET ORAL ONCE
Status: COMPLETED | OUTPATIENT
Start: 2024-10-31 | End: 2024-10-31

## 2024-10-31 RX ADMIN — PREDNISONE 20 MG: 20 TABLET ORAL at 18:32

## 2024-10-31 RX ADMIN — FAMOTIDINE 20 MG: 20 TABLET, FILM COATED ORAL at 18:32

## 2024-10-31 NOTE — Clinical Note
Beatriz Campos was seen and treated in our emergency department on 10/31/2024.                Diagnosis:     Beatriz  may return to work on return date.    She may return on this date: 11/04/2024         If you have any questions or concerns, please don't hesitate to call.      Lucy Avelar, DO    ______________________________           _______________          _______________  Hospital Representative                              Date                                Time

## 2024-10-31 NOTE — ED PROVIDER NOTES
Time reflects when diagnosis was documented in both MDM as applicable and the Disposition within this note       Time User Action Codes Description Comment    10/31/2024  9:27 PM Lucy Avelar Add [L50.9] Urticaria     10/31/2024  9:27 PM Lucy Avelar Add [R22.0] Lip swelling     10/31/2024  9:27 PM Lucy Avelar Add [T78.40XA] Allergic reaction, initial encounter     10/31/2024  9:29 PM Lucy Avelar Add [R07.9] Chest pain           ED Disposition       ED Disposition   Discharge    Condition   Stable    Date/Time   Thu Oct 31, 2024  9:28 PM    Comment   Beatriz Campos discharge to home/self care.                   Assessment & Plan       Medical Decision Making  Amount and/or Complexity of Data Reviewed  Labs: ordered. Decision-making details documented in ED Course.  Radiology: ordered and independent interpretation performed. Decision-making details documented in ED Course.    Risk  Prescription drug management.    63 yo F presented to ED for lip swelling. Associated symptoms: Rash, pruritus, chest pain. Exam findings: Minimal swelling to upper lip, patient states improving, urticarial rash under breasts and lower back.      Differentials diagnoses considered: Allergic reaction versus angioedema versus less likely ACS versus chest wall pain versus GERD.     Patient was given Pepcid, prednisone for allergic reaction. On re-examination, patient had improvement.  See ED course for more details based on these results and H&P, allergic reaction. Results and clinical impressions were discussed with patient and family. They expressed understanding. Plan: Discharged home with instruction to follow-up with primary care doctor, instructed to return to the emergency department for any worsening symptoms, prescription sent to pharmacy for steroids. This plan was also discussed with patient, who was agreeable with this plan. Patient was given the opportunity to ask questions in ED. All questions and concerns  "were addressed in ED.     ED Course as of 11/02/24 0337   Thu Oct 31, 2024   1831 XR chest 1 view portable  No acute cardiopulmonary process visualized.   1926 CBC and differential(!)  Anemia, appears to be at baseline.  No leukocytosis leukopenia.  Platelets within normal limits.  Differential reassuring.   1946 Comprehensive metabolic panel(!)  Unremarkable.   1948 hs TnI 0hr: 5  EKG did not show any acute ischemic changes.   2120 Lip swelling slightly improved   2126 hs TnI 2hr: 5  Delta 0       Medications   famotidine (PEPCID) tablet 20 mg (20 mg Oral Given 10/31/24 1832)   predniSONE tablet 20 mg (20 mg Oral Given 10/31/24 1832)       ED Risk Strat Scores   HEART Risk Score      Flowsheet Row Most Recent Value   Heart Score Risk Calculator    History 1 Filed at: 10/31/2024 2129   ECG 0 Filed at: 10/31/2024 2129   Age 1 Filed at: 10/31/2024 2129   Risk Factors 1 Filed at: 10/31/2024 2129   Troponin 0 Filed at: 10/31/2024 2129   HEART Score 3 Filed at: 10/31/2024 2129                                                   History of Present Illness       Chief Complaint   Patient presents with    Rash     Pt c/o of worsening rash for the las week. Started around waist area has spread to face/scalp and lower extremities. Pt denies fevers/chill/ any other symptoms. Pt denies using any new products except for  a perfume that she stopped using 3 days ago. Pt also has some lip swelling, that started earlier today. No SOB/Difficulty swallowing/Airway intact +pruritus        Past Medical History:   Diagnosis Date    Abdominal pain     \"burning pain\" came to the ED    Colon cancer (HCC) 11/12/23    Renal disorder     Wears dentures     permanent upper denture    Weight loss     20 lb wt loss in 2-3 weeks d/t stomach issues      Past Surgical History:   Procedure Laterality Date    CHOLECYSTECTOMY      EXPLORATORY LAPAROTOMY W/ BOWEL RESECTION N/A 11/12/2023    Procedure: LAPAROTOMY OPEN W/ RIGHT COLON RESECTION;  Surgeon: " Juan Obrien MD;  Location: [facility];  Service: General    TUBAL LIGATION      at 36       Family History   Problem Relation Age of Onset    Arthritis Mother     Heart attack Father     Coronary artery disease Father     Glaucoma Father     Heart disease Father       Social History     Tobacco Use    Smoking status: Never    Smokeless tobacco: Never    Tobacco comments:     social    Vaping Use    Vaping status: Never Used   Substance Use Topics    Alcohol use: Not Currently    Drug use: Never      E-Cigarette/Vaping    E-Cigarette Use Never User       E-Cigarette/Vaping Substances      I have reviewed and agree with the history as documented.     HPI  64-year-old F with h/o B-cell lymphoma (in remission), colon cancer presenting to ED with lip swelling. Has been having associated rash, pruritus.  Patient denies any recent new detergents, soaps or medications.  Patient states that she has used a new perfume but has not used it last 3 days.  Lip swelling started today but has been having the rash and pruritus for 1 week.  Also having chest pain that started today.  Benadryl makes symptoms better, however makes her sleepy.  Denies fever, chills, abdominal pain, nausea, vomiting, bowel/bladder changes, shortness of breath, leg swelling, throat swelling and any other signs symptoms.      Review of Systems  Constitutional: No fever, chills, weight changes, appetite changes  HENT: No sore throat, rhinorrhea, sinus pain, tongue swelling, throat swelling  Eyes: No redness, discharge, pain  Cardio: No palpitations, lower extremity edema +CP  Pulm: No cough, shortness of breath, wheezing  GI: No nausea, vomiting, bowel habit changes  : No dysuria, hematuria, urine output changes  MSK: No back pain, joint pain, neck pain  Neuro: No headache, dizziness, paresthesias  Skin: No wounds +rash, lip swelling  Otherwise review of symptoms is negative.       Objective       ED Triage Vitals [10/31/24 1637]   Temperature  Pulse Blood Pressure Respirations SpO2 Patient Position - Orthostatic VS   98.2 °F (36.8 °C) 81 158/68 16 99 % Sitting      Temp Source Heart Rate Source BP Location FiO2 (%) Pain Score    Oral Monitor Right arm -- --      Vitals      Date and Time Temp Pulse SpO2 Resp BP Pain Score FACES Pain Rating User   10/31/24 1637 98.2 °F (36.8 °C) 81 99 % 16 158/68 -- -- TS            Physical Exam  General appearance: resting comfortably, no acute distress   HENT: Normocephalic, atraumatic, hearing grossly intact, and mucous membranes moist.  Upper lip swelling, patient states improved from earlier today.  Eyes: Conjunctiva normal, PERRL, EOM intact   Lungs/Chest: Respirations even and unlabored, breath sounds normal  Cardiovascular: Normal rate, regular rhythm  Abdomen: soft, non-distended, non-tender  Musculoskeletal: extremities normal, atraumatic, no cyanosis or edema   Pulses: radial / dorsalis pedis pulses palpable  Skin: warm and dry.  Urticarial rash under bra line and lower back.  Neurologic: Awake and alert, no apparent focal deficit  Awake, alert, oriented. No apparent focal deficit  Face symmetric, PERRL. EOM Intact  Speaks spontaneously, actively following commands.   Full strength, moving all extremities equally. Sensation intact to light touch B/L UE and LE.  Psych: Mood consistent with affect       Results Reviewed       Procedure Component Value Units Date/Time    HS Troponin I 2hr [[id number]]  (Normal) Collected: 10/31/24 2053    Lab Status: Final result Specimen: Blood from Arm, Right Updated: 10/31/24 2124     hs TnI 2hr 5 ng/L      Delta 2hr hsTnI 0 ng/L     HS Troponin 0hr (reflex protocol) [[id number]]  (Normal) Collected: 10/31/24 1845    Lab Status: Final result Specimen: Blood from Arm, Right Updated: 10/31/24 1947     hs TnI 0hr 5 ng/L     Comprehensive metabolic panel [[id number]]  (Abnormal) Collected: 10/31/24 1845    Lab Status: Final result Specimen: Blood from Arm, Right Updated: 10/31/24 1938      Sodium 140 mmol/L      Potassium 3.7 mmol/L      Chloride 108 mmol/L      CO2 28 mmol/L      ANION GAP 4 mmol/L      BUN 13 mg/dL      Creatinine 0.58 mg/dL      Glucose 99 mg/dL      Calcium 8.9 mg/dL      AST 12 U/L      ALT 9 U/L      Alkaline Phosphatase 70 U/L      Total Protein 7.5 g/dL      Albumin 4.0 g/dL      Total Bilirubin 0.26 mg/dL      eGFR 98 ml/min/1.73sq m     Narrative:      National Kid[employer] Foundation guidelines for Chronic Kidney Disease (CKD):     Stage 1 with normal or high GFR (GFR > 90 mL/min/1.73 square meters)    Stage 2 Mild CKD (GFR = 60-89 mL/min/1.73 square meters)    Stage 3A Moderate CKD (GFR = 45-59 mL/min/1.73 square meters)    Stage 3B Moderate CKD (GFR = 30-44 mL/min/1.73 square meters)    Stage 4 Severe CKD (GFR = 15-29 mL/min/1.73 square meters)    Stage 5 End Stage CKD (GFR <15 mL/min/1.73 square meters)  Note: GFR calculation is accurate only with a steady state creatinine    CBC and differential [[id number]]  (Abnormal) Collected: 10/31/24 1845    Lab Status: Final result Specimen: Blood from Arm, Right Updated: 10/31/24 1901     WBC 8.47 Thousand/uL      RBC 4.41 Million/uL      Hemoglobin 9.2 g/dL      Hematocrit 31.2 %      MCV 71 fL      MCH 20.9 pg      MCHC 29.5 g/dL      RDW 25.2 %      MPV 9.5 fL      Platelets 353 Thousands/uL      nRBC 0 /100 WBCs      Segmented % 49 %      Immature Grans % 0 %      Lymphocytes % 41 %      Monocytes % 8 %      Eosinophils Relative 2 %      Basophils Relative 0 %      Absolute Neutrophils 4.14 Thousands/µL      Absolute Immature Grans 0.02 Thousand/uL      Absolute Lymphocytes 3.47 Thousands/µL      Absolute Monocytes 0.64 Thousand/µL      Eosinophils Absolute 0.17 Thousand/µL      Basophils Absolute 0.03 Thousands/µL             XR chest 1 view portable   ED Interpretation by Lucy Avelar DO (10/31 1831)   No acute cardiopulmonary process visualized.      Final Interpretation by Juma Yeager MD (11/01  1329)      No acute cardiopulmonary disease.            Workstation performed: [id number]             ECG 12 Lead Documentation Only    Date/Time: 10/31/2024 7:02 PM    Performed by: Lucy Avelar DO  Authorized by: Lucy Avelar DO    Patient location:  ED  Previous ECG:     Previous ECG:  Compared to current    Similarity:  No change  Interpretation:     Interpretation: normal    Rate:     ECG rate:  69    ECG rate assessment: normal    Rhythm:     Rhythm: sinus rhythm    Ectopy:     Ectopy: none    QRS:     QRS axis:  Normal    QRS intervals:  Normal  Conduction:     Conduction: normal    ST segments:     ST segments:  Normal  T waves:     T waves: normal        ED Medication and Procedure Management   Prior to Admission Medications   Prescriptions Last Dose Informant Patient Reported? Taking?   Calcium Carbonate (CALCIUM 500 PO)   Yes No   Sig: Take 1 tablet by mouth 2 (two) times a day   Melatonin 10 MG SUBL   Yes No   Sig: Place under the tongue   acetaminophen (TYLENOL) 325 mg tablet   No No   Sig: Take 1.5 tablets (488 mg total) by mouth every 6 (six) hours as needed for mild pain   cholecalciferol (VITAMIN D3) 1,000 units tablet   Yes No   Sig: Take 1,000 Units by mouth daily   cyanocobalamin (VITAMIN B-12) 1000 MCG tablet   Yes No   Sig: Take 1,000 mcg by mouth daily   lidocaine (LIDODERM) 5 %   No No   Sig: Apply 1 patch topically over 12 hours every 24 hours Remove & Discard patch within 12 hours or as directed by MD   magnesium citrate solution   Yes No   Sig: Take 296 mL by mouth once   methocarbamol (ROBAXIN) 500 mg tablet   No No   Sig: Take 1 tablet (500 mg total) by mouth 2 (two) times a day for 3 days   multivitamin (THERAGRAN) TABS   Yes No   Sig: Take 1 tablet by mouth daily   naproxen (Naprosyn) 500 mg tablet   No No   Sig: Take 1 tablet (500 mg total) by mouth 2 (two) times a day with meals for 3 days      Facility-Administered Medications: None     Discharge Medication List as  of 10/31/2024  9:30 PM        START taking these medications    Details   predniSONE 20 mg tablet Take 2 tablets (40 mg total) by mouth daily for 3 days, Starting Thu 10/31/2024, Until Sun 11/3/2024, Normal           CONTINUE these medications which have NOT CHANGED    Details   acetaminophen (TYLENOL) 325 mg tablet Take 1.5 tablets (488 mg total) by mouth every 6 (six) hours as needed for mild pain, Starting Thu 11/16/2023, OTC      Calcium Carbonate (CALCIUM 500 PO) Take 1 tablet by mouth 2 (two) times a day, Historical Med      cholecalciferol (VITAMIN D3) 1,000 units tablet Take 1,000 Units by mouth daily, Historical Med      cyanocobalamin (VITAMIN B-12) 1000 MCG tablet Take 1,000 mcg by mouth daily, Historical Med      lidocaine (LIDODERM) 5 % Apply 1 patch topically over 12 hours every 24 hours Remove & Discard patch within 12 hours or as directed by MD, Starting Thu 5/9/2024, Normal      magnesium citrate solution Take 296 mL by mouth once, Historical Med      Melatonin 10 MG SUBL Place under the tongue, Historical Med      methocarbamol (ROBAXIN) 500 mg tablet Take 1 tablet (500 mg total) by mouth 2 (two) times a day for 3 days, Starting Thu 5/9/2024, Until Sun 5/12/2024, Normal      multivitamin (THERAGRAN) TABS Take 1 tablet by mouth daily, Historical Med      naproxen (Naprosyn) 500 mg tablet Take 1 tablet (500 mg total) by mouth 2 (two) times a day with meals for 3 days, Starting Thu 5/9/2024, Until Sun 5/12/2024, Normal           No discharge procedures on file.  ED SEPSIS DOCUMENTATION   Time reflects when diagnosis was documented in both MDM as applicable and the Disposition within this note       Time User Action Codes Description Comment    10/31/2024  9:27 PM Lucy Avelar [L50.9] Urticaria     10/31/2024  9:27 PM Lucy Avelar [R22.0] Lip swelling     10/31/2024  9:27 PM Lucy Avlear [T78.40XA] Allergic reaction, initial encounter     10/31/2024  9:29 PM Lucy Avelar Add  [R07.9] Chest pain                  Lucy Avelar DO  11/02/24 0337

## 2024-11-01 NOTE — ED ATTENDING ATTESTATION
10/31/2024  ICaro MD, saw and evaluated the patient. I have discussed the patient with the resident/non-physician practitioner and agree with the resident's/non-physician practitioner's findings, Plan of Care, and MDM as documented in the resident's/non-physician practitioner's note, except where noted. All available labs and Radiology studies were reviewed.  I was present for key portions of any procedure(s) performed by the resident/non-physician practitioner and I was immediately available to provide assistance.       At this point I agree with the current assessment done in the Emergency Department.  I have conducted an independent evaluation of this patient a history and physical is as follows:    64-year-old presenting to the ER with a rash, hives, over the torso.  Noticed today some swelling of the upper lip.  No airway involvement.  No tongue or throat swelling.  No trouble breathing.  No nausea vomiting.  No lightness or dizziness.    She notes earlier today around 6 hours ago had 1 hour of central chest pain going to her back.  States started after she started eating food.  Believes it was related to reflux and food.  No history of CAD.  No smoking.  No drugs.    For the lip swelling will give steroids for likely allergic reaction, follow-up with PCP.    Regarding chest pain agree with cardiac evaluation.  Low risk but will need evaluation with EKG and troponin.      ED Course         Critical Care Time  Procedures

## 2024-11-03 LAB
ATRIAL RATE: 69 BPM
P AXIS: 55 DEGREES
PR INTERVAL: 154 MS
QRS AXIS: 53 DEGREES
QRSD INTERVAL: 78 MS
QT INTERVAL: 400 MS
QTC INTERVAL: 428 MS
T WAVE AXIS: 63 DEGREES
VENTRICULAR RATE: 69 BPM

## 2024-11-03 PROCEDURE — 93010 ELECTROCARDIOGRAM REPORT: CPT | Performed by: INTERNAL MEDICINE

## 2024-11-22 ENCOUNTER — TELEPHONE (OUTPATIENT)
Dept: HEMATOLOGY ONCOLOGY | Facility: CLINIC | Age: 64
End: 2024-11-22

## 2024-11-22 DIAGNOSIS — D50.9 IRON DEFICIENCY ANEMIA, UNSPECIFIED IRON DEFICIENCY ANEMIA TYPE: ICD-10-CM

## 2024-11-22 DIAGNOSIS — C83.33 DIFFUSE LARGE B-CELL LYMPHOMA OF INTRA-ABDOMINAL LYMPH NODES (HCC): Primary | ICD-10-CM

## 2024-11-22 NOTE — TELEPHONE ENCOUNTER
Patient reached out to us re: labs    3/2020 EGD and colonoscopy- normal colonoscopy.  Normal upper endoscopy exam     5/2023  hgb 12.9, MCV 89    11/11/2023  hgb 11.2, MCV 91    11/11/23 CT A/P- intussusception of the cecum and terminal ileum into the ascending colon. There is abnormal soft tissue density measuring approximately 5.1 x 2.7 cm along the cecum, and possibly the terminal ileum. There are several adjacent mesenteric lymph nodes measuring up to 6 mm    11/12/23  open right hemicolectomy    10/31/24 presented to ED secondary to rash and hives over her torso.  She was given steroids.    10/31/24 hgb 9.2, MCV 71, white blood cell count 8.4, platelets 353, normal differential    Patient's lab findings consistent with iron deficiency.     Called daughter Tiera at [phone].    Reports mom is Feeling ok.  To her knowledge, no melena, hematochezia, hematuria, vaginal bleeding.  She will double check with her mom this afternoon.  Reports her mom has not been complaining of any dyspepsia, fevers, chills, sweats.  No change with abdominal discomfort, she has chronic mild abdominal discomfort since her surgery November 2023    Recommend repeat CBCD and iron panel.  Also, GI evaluation and repeat CT C/A/P    Orders Placed This Encounter   Procedures    CT chest abdomen pelvis w contrast    CBC and differential    Comprehensive metabolic panel    Leukemia/Lymphoma flow cytometry    LD,Blood    Ambulatory Referral to Gastroenterology

## 2024-11-27 ENCOUNTER — HOSPITAL ENCOUNTER (OUTPATIENT)
Dept: RADIOLOGY | Facility: HOSPITAL | Age: 64
Discharge: HOME | End: 2024-11-27
Payer: COMMERCIAL

## 2024-11-27 DIAGNOSIS — C83.33 DIFFUSE LARGE B-CELL LYMPHOMA OF INTRA-ABDOMINAL LYMPH NODES (HCC): ICD-10-CM

## 2024-11-27 PROCEDURE — 71260 CT THORAX DX C+: CPT

## 2024-11-27 PROCEDURE — 74177 CT ABD & PELVIS W/CONTRAST: CPT

## 2024-11-27 RX ADMIN — IOHEXOL 100 ML: 350 INJECTION, SOLUTION INTRAVENOUS at 08:07

## 2024-12-02 ENCOUNTER — TELEPHONE (OUTPATIENT)
Dept: HEMATOLOGY ONCOLOGY | Facility: CLINIC | Age: 64
End: 2024-12-02

## 2024-12-02 NOTE — PROGRESS NOTES
CT C/A/P - [facility]    Patient hasn't had repeat labs as requested.  Also, don't see that GI appt made yet.

## 2024-12-02 NOTE — TELEPHONE ENCOUNTER
Called and reviewed below message to pts daughter. Pts daughter aware for pt to get labs and GI referral.     ----- Message from Sravanthi Dumont PA-C sent at 12/2/2024  1:56 PM EST -----  Please let daughter know CT scan didn't show lymphoma.      She didn't have labs or make GI appt as requested-  She should have these done

## 2024-12-20 ENCOUNTER — CONSULT (OUTPATIENT)
Dept: GASTROENTEROLOGY | Facility: CLINIC | Age: 64
End: 2024-12-20
Payer: COMMERCIAL

## 2024-12-20 VITALS
HEIGHT: 59 IN | OXYGEN SATURATION: 97 % | SYSTOLIC BLOOD PRESSURE: 155 MMHG | HEART RATE: 89 BPM | BODY MASS INDEX: 33.87 KG/M2 | WEIGHT: 168 LBS | DIASTOLIC BLOOD PRESSURE: 93 MMHG

## 2024-12-20 DIAGNOSIS — C83.33 DIFFUSE LARGE B-CELL LYMPHOMA OF INTRA-ABDOMINAL LYMPH NODES (HCC): ICD-10-CM

## 2024-12-20 DIAGNOSIS — R13.19 ESOPHAGEAL DYSPHAGIA: ICD-10-CM

## 2024-12-20 DIAGNOSIS — K76.89 LIVER CYST: ICD-10-CM

## 2024-12-20 DIAGNOSIS — D50.9 IRON DEFICIENCY ANEMIA, UNSPECIFIED IRON DEFICIENCY ANEMIA TYPE: Primary | ICD-10-CM

## 2024-12-20 PROBLEM — R10.13 EPIGASTRIC PAIN: Status: RESOLVED | Noted: 2020-02-26 | Resolved: 2024-12-20

## 2024-12-20 PROBLEM — R13.10 DYSPHAGIA: Status: ACTIVE | Noted: 2024-12-20

## 2024-12-20 PROBLEM — R10.10 PAIN OF UPPER ABDOMEN: Status: RESOLVED | Noted: 2020-03-11 | Resolved: 2024-12-20

## 2024-12-20 PROCEDURE — 99244 OFF/OP CNSLTJ NEW/EST MOD 40: CPT | Performed by: INTERNAL MEDICINE

## 2024-12-20 RX ORDER — GABAPENTIN 300 MG/1
300 CAPSULE ORAL
COMMUNITY
Start: 2024-11-04 | End: 2025-05-03

## 2024-12-20 RX ORDER — SODIUM CHLORIDE, SODIUM LACTATE, POTASSIUM CHLORIDE, CALCIUM CHLORIDE 600; 310; 30; 20 MG/100ML; MG/100ML; MG/100ML; MG/100ML
125 INJECTION, SOLUTION INTRAVENOUS CONTINUOUS
OUTPATIENT
Start: 2024-12-20

## 2024-12-20 RX ORDER — SODIUM PICOSULFATE, MAGNESIUM OXIDE, AND ANHYDROUS CITRIC ACID 12; 3.5; 1 G/175ML; G/175ML; MG/175ML
LIQUID ORAL
Qty: 350 ML | Refills: 0 | Status: SHIPPED | OUTPATIENT
Start: 2024-12-20

## 2024-12-20 RX ORDER — FAMOTIDINE 20 MG/1
20 TABLET, FILM COATED ORAL 2 TIMES DAILY
COMMUNITY
Start: 2024-11-20

## 2024-12-20 RX ORDER — CETIRIZINE HYDROCHLORIDE 10 MG/1
10 TABLET ORAL DAILY
COMMUNITY
Start: 2024-11-20 | End: 2025-01-19

## 2024-12-20 RX ORDER — MELOXICAM 15 MG/1
15 TABLET ORAL DAILY
COMMUNITY
Start: 2024-11-04 | End: 2025-05-03

## 2024-12-20 NOTE — ASSESSMENT & PLAN NOTE
-Unclear etiology, will proceed with an upper endoscopy to further evaluate  Orders:    sodium picosulfate, magnesium oxide, citric acid (Clenpiq) oral solution; Take 175 mL (1 bottle) the evening before the colonoscopy, between 5 PM and 9 PM, followed by a second 175 mL bottle 5 hours before the colonoscopy.

## 2024-12-20 NOTE — PROGRESS NOTES
Name: Beatriz Campos      : 1960      MRN: [id number]  Encounter Provider: Ja Kumar MD  Encounter Date: 2024   Encounter department: [facility] [place]  :  64-year-old female, had a normal EGD and colonoscopy in , presented in 2023 with obstruction, intussusception secondary to diffuse large B-cell lymphoma of her colon requiring right hemicolectomy, also underwent elective marsupialization of hepatic cyst at that time.  Now presenting with intermittent dysphagia and iron deficiency anemia, microcytic anemia  Assessment & Plan  Iron deficiency anemia, unspecified iron deficiency anemia type  -Unclear etiology, differential clued peptic ulcer disease, celiac sprue, recurrent GI pathology  -Will schedule the patient for an EGD and colonoscopy to evaluate for luminal disease including celiac  -Discussed with the risks of procedure including bleeding, surgery, perforation  Orders:    Ambulatory Referral to Gastroenterology    Colonoscopy; Future    EGD; Future    sodium picosulfate, magnesium oxide, citric acid (Clenpiq) oral solution; Take 175 mL (1 bottle) the evening before the colonoscopy, between 5 PM and 9 PM, followed by a second 175 mL bottle 5 hours before the colonoscopy.    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    Patient's imaging  "studies, prior surgical reports, operative reports were reviewed.  History was independently obtained using  services.      History of Present Illness   HPI  Beatriz Campos is a 64 y.o. female who presents for iron deficiency anemia.  This is a pleasant 64-year-old female who presented in 2023, November with obstructive symptoms was found to have intussusception of her colon, resection showed large B-cell lymphoma which caused intussusception with 1 positive lymph node, she had right hemicolectomy, she had a known hepatic cyst which was also marsupialized at the time of the procedure.  She has been doing well since then, did not require chemotherapy.  Recent operatory studies have demonstrated progressive microcytic anemia.  She denies any abdominal pain, nausea, vomiting, heartburn, diarrhea, change in bowel moods, abdominal pain.  She denies any melena or rectal bleeding.  She does have a longstanding history of intermittent dysphagia.  She was referred for both dysphagia and anemia.    Reports that her weight has been stable.    Medical history as noted above for diffuse large B cell B-cell lymphoma.  She does report recently having some hives for which she takes Allegra and famotidine, unclear etiology, she has not seen allergy had.    Surgical history is notable for tubal ligation and right hemicolectomy as noted above with marsupialization of her hepatic cyst.    Denies any alcohol or tobacco.  She works in a factory.    Family history is negative for GI or associated malignancies.    She does report occasional chest discomfort when she is sitting or laying down not with activity.  Rash as noted above.  Some nocturia and difficulty sleeping.      Review of Systems  Review of systems was negative except for pertinent positive mentioned in HPI         Objective   /93 (BP Location: Right arm, Patient Position: Sitting, Cuff Size: Standard)   Pulse 89   Ht 4' 11\" (1.499 m)   Wt 76.2 kg (168 " lb)   LMP  (LMP Unknown)   SpO2 97%   BMI 33.93 kg/m²      Physical Exam  GEN: WN/WD, no acute distress, overweight  HEENT: anicteric, MMM, no cervical lymphadenopathy  CV: RRR, no m/r/g  CHEST: CTA b/l, no WRR  ABD: +BS, soft NT/ND, no hepatosplenomegaly  EXT: no C/C/E  NEURO: AAOx3  SKIN: no rashes

## 2024-12-20 NOTE — PATIENT INSTRUCTIONS
Scheduled date of EGD/colonoscopy (as of today): 1/10/25  Physician performing EGD/colonoscopy: Dr. Kumar  Location of EGD/colonoscopy: [facility]  Desired bowel prep reviewed with patient: Clenpiq  Instructions reviewed with patient by: N.M.  Clearances: N.AValdo

## 2024-12-20 NOTE — ASSESSMENT & PLAN NOTE
-Status post marsupialization, most recent imaging studies demonstrate overall improved in size still present

## 2024-12-20 NOTE — ASSESSMENT & PLAN NOTE
-Status post right hemicolectomy  -Will reevaluate at the time of colonoscopy  Orders:    Ambulatory Referral to Gastroenterology

## 2024-12-26 ENCOUNTER — ANESTHESIA (OUTPATIENT)
Dept: ANESTHESIOLOGY | Facility: HOSPITAL | Age: 64
End: 2024-12-26

## 2024-12-26 ENCOUNTER — ANESTHESIA EVENT (OUTPATIENT)
Dept: ANESTHESIOLOGY | Facility: HOSPITAL | Age: 64
End: 2024-12-26

## 2025-01-03 ENCOUNTER — TELEPHONE (OUTPATIENT)
Dept: GASTROENTEROLOGY | Facility: CLINIC | Age: 65
End: 2025-01-03

## 2025-01-03 NOTE — TELEPHONE ENCOUNTER
Left message confirming 1/10 procedure with Dr. Kumar. She will need a  to and from the facility due to having sedation. She will be called day prior with her arrival time. If she doesn't have her prep instructions, they are in her my chart. Any questions, she may call.

## 2025-01-10 ENCOUNTER — ANESTHESIA EVENT (OUTPATIENT)
Dept: GASTROENTEROLOGY | Facility: AMBULARY SURGERY CENTER | Age: 65
End: 2025-01-10
Payer: COMMERCIAL

## 2025-01-10 ENCOUNTER — HOSPITAL ENCOUNTER (OUTPATIENT)
Dept: GASTROENTEROLOGY | Facility: AMBULARY SURGERY CENTER | Age: 65
Setting detail: OUTPATIENT SURGERY
End: 2025-01-10
Attending: INTERNAL MEDICINE
Payer: COMMERCIAL

## 2025-01-10 VITALS
DIASTOLIC BLOOD PRESSURE: 64 MMHG | TEMPERATURE: 98 F | OXYGEN SATURATION: 95 % | SYSTOLIC BLOOD PRESSURE: 114 MMHG | HEART RATE: 64 BPM | RESPIRATION RATE: 18 BRPM

## 2025-01-10 DIAGNOSIS — D50.9 IRON DEFICIENCY ANEMIA, UNSPECIFIED IRON DEFICIENCY ANEMIA TYPE: ICD-10-CM

## 2025-01-10 DIAGNOSIS — K20.90 ESOPHAGITIS: Primary | ICD-10-CM

## 2025-01-10 PROCEDURE — 43239 EGD BIOPSY SINGLE/MULTIPLE: CPT | Performed by: INTERNAL MEDICINE

## 2025-01-10 PROCEDURE — 88305 TISSUE EXAM BY PATHOLOGIST: CPT | Performed by: STUDENT IN AN ORGANIZED HEALTH CARE EDUCATION/TRAINING PROGRAM

## 2025-01-10 PROCEDURE — 88341 IMHCHEM/IMCYTCHM EA ADD ANTB: CPT | Performed by: STUDENT IN AN ORGANIZED HEALTH CARE EDUCATION/TRAINING PROGRAM

## 2025-01-10 PROCEDURE — 88342 IMHCHEM/IMCYTCHM 1ST ANTB: CPT | Performed by: STUDENT IN AN ORGANIZED HEALTH CARE EDUCATION/TRAINING PROGRAM

## 2025-01-10 PROCEDURE — 45385 COLONOSCOPY W/LESION REMOVAL: CPT | Performed by: INTERNAL MEDICINE

## 2025-01-10 RX ORDER — PROPOFOL 10 MG/ML
INJECTION, EMULSION INTRAVENOUS AS NEEDED
Status: DISCONTINUED | OUTPATIENT
Start: 2025-01-10 | End: 2025-01-10

## 2025-01-10 RX ORDER — SODIUM CHLORIDE, SODIUM LACTATE, POTASSIUM CHLORIDE, CALCIUM CHLORIDE 600; 310; 30; 20 MG/100ML; MG/100ML; MG/100ML; MG/100ML
INJECTION, SOLUTION INTRAVENOUS CONTINUOUS PRN
Status: DISCONTINUED | OUTPATIENT
Start: 2025-01-10 | End: 2025-01-10

## 2025-01-10 RX ORDER — PANTOPRAZOLE SODIUM 40 MG/1
40 TABLET, DELAYED RELEASE ORAL DAILY
Qty: 30 TABLET | Refills: 3 | Status: SHIPPED | OUTPATIENT
Start: 2025-01-10

## 2025-01-10 RX ORDER — LIDOCAINE HYDROCHLORIDE 10 MG/ML
INJECTION, SOLUTION EPIDURAL; INFILTRATION; INTRACAUDAL; PERINEURAL AS NEEDED
Status: DISCONTINUED | OUTPATIENT
Start: 2025-01-10 | End: 2025-01-10

## 2025-01-10 RX ORDER — SODIUM CHLORIDE, SODIUM LACTATE, POTASSIUM CHLORIDE, CALCIUM CHLORIDE 600; 310; 30; 20 MG/100ML; MG/100ML; MG/100ML; MG/100ML
125 INJECTION, SOLUTION INTRAVENOUS CONTINUOUS
Status: DISCONTINUED | OUTPATIENT
Start: 2025-01-10 | End: 2025-01-14 | Stop reason: HOSPADM

## 2025-01-10 RX ADMIN — LIDOCAINE HYDROCHLORIDE 50 MG: 10 INJECTION, SOLUTION EPIDURAL; INFILTRATION; INTRACAUDAL; PERINEURAL at 09:35

## 2025-01-10 RX ADMIN — PROPOFOL 50 MG: 10 INJECTION, EMULSION INTRAVENOUS at 09:50

## 2025-01-10 RX ADMIN — PROPOFOL 50 MG: 10 INJECTION, EMULSION INTRAVENOUS at 09:42

## 2025-01-10 RX ADMIN — PROPOFOL 50 MG: 10 INJECTION, EMULSION INTRAVENOUS at 09:39

## 2025-01-10 RX ADMIN — SODIUM CHLORIDE, SODIUM LACTATE, POTASSIUM CHLORIDE, AND CALCIUM CHLORIDE: .6; .31; .03; .02 INJECTION, SOLUTION INTRAVENOUS at 09:35

## 2025-01-10 RX ADMIN — PROPOFOL 100 MG: 10 INJECTION, EMULSION INTRAVENOUS at 09:35

## 2025-01-10 RX ADMIN — PROPOFOL 50 MG: 10 INJECTION, EMULSION INTRAVENOUS at 09:46

## 2025-01-10 NOTE — PERIOPERATIVE NURSING NOTE
Received patient from gi rn rubén. Pt sleeping, vitals stable. Bed locked in lowest position, side rails raised and call light in reach. Rn to monitor, plan of care ongoing.

## 2025-01-10 NOTE — ANESTHESIA POSTPROCEDURE EVALUATION
Post-Op Assessment Note    CV Status:  Stable  Pain Score: 0    Pain management: adequate       Mental Status:  Arousable and sleepy   PONV Controlled:  None   Airway Patency:  Patent     Post Op Vitals Reviewed: Yes    No anethesia notable event occurred.    Staff: CRNA           Last Filed PACU Vitals:  Vitals Value Taken Time   Temp     Pulse 66    BP 96/51    Resp 16    SpO2 99

## 2025-01-10 NOTE — H&P
"History and Physical -  [facility]  Beatriz Campos 64 y.o. female MRN: [id number]    HPI: Beatriz Campos is a 64 y.o. year old female who presents with iron def anemia, dysphagia.       Review of Systems    Historical Information   Past Medical History:   Diagnosis Date    Abdominal pain     \"burning pain\" came to the ED    Chronic pain disorder     Colon cancer (HCC) 11/12/23    Renal disorder     Wears dentures     permanent upper denture    Weight loss     20 lb wt loss in 2-3 weeks d/t stomach issues     Past Surgical History:   Procedure Laterality Date    CHOLECYSTECTOMY      EGD AND COLONOSCOPY  03/23/2020    EXPLORATORY LAPAROTOMY W/ BOWEL RESECTION N/A 11/12/2023    Procedure: LAPAROTOMY OPEN W/ RIGHT COLON RESECTION;  Surgeon: Juan Obrien MD;  Location: [facility];  Service: General    TUBAL LIGATION      at 36      Social History   Social History     Substance and Sexual Activity   Alcohol Use Not Currently     Social History     Substance and Sexual Activity   Drug Use Never     Social History     Tobacco Use   Smoking Status Never   Smokeless Tobacco Never   Tobacco Comments    social      Family History   Problem Relation Age of Onset    Arthritis Mother     Heart attack Father     Coronary artery disease Father     Glaucoma Father     Heart disease Father        Meds/Allergies     Not in a hospital admission.    No Known Allergies    Objective     /58   Pulse 68   Temp 98 °F (36.7 °C) (Tympanic)   Resp 16   LMP  (LMP Unknown)   SpO2 97%       PHYSICAL EXAM    Gen: NAD  CV: RRR  CHEST: Clear  ABD: soft, NT/ND  EXT: no edema  Neuro: AAO      ASSESSMENT/PLAN:  This is a 64 y.o. year old female here for iron def anemia, dysphagia.       PLAN:   Procedure: egd/colonoscopy      "

## 2025-01-10 NOTE — ANESTHESIA PREPROCEDURE EVALUATION
Procedure:  COLONOSCOPY  EGD    Relevant Problems   GI/HEPATIC   (+) Dysphagia   (+) Liver cyst      HEMATOLOGY   (+) B-cell lymphoma (HCC)      NEURO/PSYCH   (+) Depression   (+) Headache        Physical Exam    Airway    Mallampati score: II  TM Distance: >3 FB  Neck ROM: full     Dental   No notable dental hx     Cardiovascular  Cardiovascular exam normal    Pulmonary  Pulmonary exam normal     Other Findings  post-pubertal.      Anesthesia Plan  ASA Score- 2     Anesthesia Type- IV sedation with anesthesia with ASA Monitors.         Additional Monitors:     Airway Plan:            Plan Factors-Exercise tolerance (METS): >4 METS.    Chart reviewed.   Existing labs reviewed. Patient summary reviewed.    Patient is not a current smoker.              Induction-     Postoperative Plan-     Perioperative Resuscitation Plan - Level 1 - Full Code.       Informed Consent- Anesthetic plan and risks discussed with patient.  I personally reviewed this patient with the CRNA. Discussed and agreed on the Anesthesia Plan with the CRNA..

## 2025-01-16 PROCEDURE — 88342 IMHCHEM/IMCYTCHM 1ST ANTB: CPT | Performed by: STUDENT IN AN ORGANIZED HEALTH CARE EDUCATION/TRAINING PROGRAM

## 2025-01-16 PROCEDURE — 88305 TISSUE EXAM BY PATHOLOGIST: CPT | Performed by: STUDENT IN AN ORGANIZED HEALTH CARE EDUCATION/TRAINING PROGRAM

## 2025-01-16 PROCEDURE — 88341 IMHCHEM/IMCYTCHM EA ADD ANTB: CPT | Performed by: STUDENT IN AN ORGANIZED HEALTH CARE EDUCATION/TRAINING PROGRAM

## 2025-01-19 ENCOUNTER — RESULTS FOLLOW-UP (OUTPATIENT)
Dept: GASTROENTEROLOGY | Facility: AMBULARY SURGERY CENTER | Age: 65
End: 2025-01-19

## 2025-01-19 DIAGNOSIS — D50.8 OTHER IRON DEFICIENCY ANEMIA: Primary | ICD-10-CM

## 2025-02-04 ENCOUNTER — HOSPITAL ENCOUNTER (EMERGENCY)
Facility: HOSPITAL | Age: 65
Discharge: HOME | End: 2025-02-04
Attending: EMERGENCY MEDICINE | Admitting: EMERGENCY MEDICINE
Payer: COMMERCIAL

## 2025-02-04 ENCOUNTER — APPOINTMENT (EMERGENCY)
Dept: CT IMAGING | Facility: HOSPITAL | Age: 65
End: 2025-02-04
Payer: COMMERCIAL

## 2025-02-04 VITALS
OXYGEN SATURATION: 97 % | TEMPERATURE: 98.9 F | HEART RATE: 93 BPM | DIASTOLIC BLOOD PRESSURE: 81 MMHG | RESPIRATION RATE: 18 BRPM | SYSTOLIC BLOOD PRESSURE: 169 MMHG

## 2025-02-04 DIAGNOSIS — K52.9 GASTROENTERITIS: Primary | ICD-10-CM

## 2025-02-04 DIAGNOSIS — K20.90 ESOPHAGITIS: ICD-10-CM

## 2025-02-04 LAB
ALBUMIN SERPL BCG-MCNC: 4.1 G/DL (ref 3.5–5)
ALP SERPL-CCNC: 106 U/L (ref 34–104)
ALT SERPL W P-5'-P-CCNC: 44 U/L (ref 7–52)
ANION GAP SERPL CALCULATED.3IONS-SCNC: 7 MMOL/L (ref 4–13)
AST SERPL W P-5'-P-CCNC: 69 U/L (ref 13–39)
BACTERIA UR QL AUTO: ABNORMAL /HPF
BASOPHILS # BLD AUTO: 0.02 THOUSANDS/ΜL (ref 0–0.1)
BASOPHILS NFR BLD AUTO: 0 % (ref 0–1)
BILIRUB SERPL-MCNC: 0.31 MG/DL (ref 0.2–1)
BILIRUB UR QL STRIP: NEGATIVE
BUN SERPL-MCNC: 18 MG/DL (ref 5–25)
CALCIUM SERPL-MCNC: 8.6 MG/DL (ref 8.4–10.2)
CHLORIDE SERPL-SCNC: 107 MMOL/L (ref 96–108)
CLARITY UR: CLEAR
CO2 SERPL-SCNC: 22 MMOL/L (ref 21–32)
COLOR UR: ABNORMAL
CREAT SERPL-MCNC: 0.72 MG/DL (ref 0.6–1.3)
EOSINOPHIL # BLD AUTO: 0.06 THOUSAND/ΜL (ref 0–0.61)
EOSINOPHIL NFR BLD AUTO: 1 % (ref 0–6)
ERYTHROCYTE [DISTWIDTH] IN BLOOD BY AUTOMATED COUNT: 18.6 % (ref 11.6–15.1)
GFR SERPL CREATININE-BSD FRML MDRD: 89 ML/MIN/1.73SQ M
GLUCOSE SERPL-MCNC: 95 MG/DL (ref 65–140)
GLUCOSE UR STRIP-MCNC: NEGATIVE MG/DL
HCT VFR BLD AUTO: 36.6 % (ref 34.8–46.1)
HGB BLD-MCNC: 11.4 G/DL (ref 11.5–15.4)
HGB UR QL STRIP.AUTO: ABNORMAL
IMM GRANULOCYTES # BLD AUTO: 0.02 THOUSAND/UL (ref 0–0.2)
IMM GRANULOCYTES NFR BLD AUTO: 0 % (ref 0–2)
KETONES UR STRIP-MCNC: NEGATIVE MG/DL
LEUKOCYTE ESTERASE UR QL STRIP: NEGATIVE
LIPASE SERPL-CCNC: 25 U/L (ref 11–82)
LYMPHOCYTES # BLD AUTO: 1.32 THOUSANDS/ΜL (ref 0.6–4.47)
LYMPHOCYTES NFR BLD AUTO: 23 % (ref 14–44)
MCH RBC QN AUTO: 24.8 PG (ref 26.8–34.3)
MCHC RBC AUTO-ENTMCNC: 31.1 G/DL (ref 31.4–37.4)
MCV RBC AUTO: 80 FL (ref 82–98)
MONOCYTES # BLD AUTO: 0.54 THOUSAND/ΜL (ref 0.17–1.22)
MONOCYTES NFR BLD AUTO: 10 % (ref 4–12)
MUCOUS THREADS UR QL AUTO: ABNORMAL
NEUTROPHILS # BLD AUTO: 3.7 THOUSANDS/ΜL (ref 1.85–7.62)
NEUTS SEG NFR BLD AUTO: 66 % (ref 43–75)
NITRITE UR QL STRIP: NEGATIVE
NON-SQ EPI CELLS URNS QL MICRO: ABNORMAL /HPF
NRBC BLD AUTO-RTO: 0 /100 WBCS
PH UR STRIP.AUTO: 6.5 [PH]
PLATELET # BLD AUTO: 290 THOUSANDS/UL (ref 149–390)
PMV BLD AUTO: 9.7 FL (ref 8.9–12.7)
POTASSIUM SERPL-SCNC: 4 MMOL/L (ref 3.5–5.3)
PROT SERPL-MCNC: 7.6 G/DL (ref 6.4–8.4)
PROT UR STRIP-MCNC: ABNORMAL MG/DL
RBC # BLD AUTO: 4.59 MILLION/UL (ref 3.81–5.12)
RBC #/AREA URNS AUTO: ABNORMAL /HPF
SODIUM SERPL-SCNC: 136 MMOL/L (ref 135–147)
SP GR UR STRIP.AUTO: >=1.05 (ref 1–1.03)
UROBILINOGEN UR STRIP-ACNC: <2 MG/DL
WBC # BLD AUTO: 5.66 THOUSAND/UL (ref 4.31–10.16)
WBC #/AREA URNS AUTO: ABNORMAL /HPF

## 2025-02-04 PROCEDURE — 99285 EMERGENCY DEPT VISIT HI MDM: CPT | Performed by: EMERGENCY MEDICINE

## 2025-02-04 PROCEDURE — 85025 COMPLETE CBC W/AUTO DIFF WBC: CPT

## 2025-02-04 PROCEDURE — 80053 COMPREHEN METABOLIC PANEL: CPT

## 2025-02-04 PROCEDURE — 99284 EMERGENCY DEPT VISIT MOD MDM: CPT

## 2025-02-04 PROCEDURE — 96375 TX/PRO/DX INJ NEW DRUG ADDON: CPT

## 2025-02-04 PROCEDURE — 74177 CT ABD & PELVIS W/CONTRAST: CPT

## 2025-02-04 PROCEDURE — 96376 TX/PRO/DX INJ SAME DRUG ADON: CPT

## 2025-02-04 PROCEDURE — 83690 ASSAY OF LIPASE: CPT

## 2025-02-04 PROCEDURE — 96365 THER/PROPH/DIAG IV INF INIT: CPT

## 2025-02-04 PROCEDURE — 81001 URINALYSIS AUTO W/SCOPE: CPT | Performed by: EMERGENCY MEDICINE

## 2025-02-04 PROCEDURE — 96361 HYDRATE IV INFUSION ADD-ON: CPT

## 2025-02-04 PROCEDURE — NC001 PR NO CHARGE: Performed by: EMERGENCY MEDICINE

## 2025-02-04 PROCEDURE — 36415 COLL VENOUS BLD VENIPUNCTURE: CPT

## 2025-02-04 RX ORDER — ONDANSETRON 2 MG/ML
4 INJECTION INTRAMUSCULAR; INTRAVENOUS ONCE
Status: COMPLETED | OUTPATIENT
Start: 2025-02-04 | End: 2025-02-04

## 2025-02-04 RX ORDER — MAGNESIUM HYDROXIDE/ALUMINUM HYDROXICE/SIMETHICONE 120; 1200; 1200 MG/30ML; MG/30ML; MG/30ML
30 SUSPENSION ORAL ONCE
Status: COMPLETED | OUTPATIENT
Start: 2025-02-04 | End: 2025-02-04

## 2025-02-04 RX ORDER — ACETAMINOPHEN 10 MG/ML
1000 INJECTION, SOLUTION INTRAVENOUS ONCE
Status: COMPLETED | OUTPATIENT
Start: 2025-02-04 | End: 2025-02-04

## 2025-02-04 RX ORDER — ONDANSETRON 4 MG/1
4 TABLET, ORALLY DISINTEGRATING ORAL EVERY 6 HOURS PRN
Qty: 10 TABLET | Refills: 0 | Status: SHIPPED | OUTPATIENT
Start: 2025-02-04

## 2025-02-04 RX ADMIN — SODIUM CHLORIDE 500 ML: 0.9 INJECTION, SOLUTION INTRAVENOUS at 19:16

## 2025-02-04 RX ADMIN — ONDANSETRON 4 MG: 2 INJECTION, SOLUTION INTRAMUSCULAR; INTRAVENOUS at 19:19

## 2025-02-04 RX ADMIN — ONDANSETRON 4 MG: 2 INJECTION, SOLUTION INTRAMUSCULAR; INTRAVENOUS at 21:01

## 2025-02-04 RX ADMIN — IOHEXOL 100 ML: 350 INJECTION, SOLUTION INTRAVENOUS at 19:42

## 2025-02-04 RX ADMIN — ACETAMINOPHEN 1000 MG: 10 INJECTION INTRAVENOUS at 19:20

## 2025-02-04 RX ADMIN — ALUMINUM HYDROXIDE, MAGNESIUM HYDROXIDE, AND DIMETHICONE 30 ML: 200; 20; 200 SUSPENSION ORAL at 21:02

## 2025-02-04 NOTE — Clinical Note
Beatriz Campos was seen and treated in our emergency department on 2/4/2025.                Diagnosis:     Beatriz  may return to work on return date.    She may return on this date: 02/06/2025         If you have any questions or concerns, please don't hesitate to call.      Mary Ferris MD    ______________________________           _______________          _______________  Hospital Representative                              Date                                Time

## 2025-02-04 NOTE — ED PROVIDER NOTES
ED triage provider note:          No diagnosis found.        MDM  Number of Diagnoses or Management Options  Diagnosis management comments:       Initial ED assessment:  abd pain, diarrhea.  Tendernesss.  Concern for acute intraabd process       Initial ED plan:   CT, labs,  ED eval                  ED Disposition       None          Follow-up Information    None                       Chief Complaint   Patient presents with    Abdominal Pain     Abd pain and diarrhea since Sunday             65yo epigastric pain for 2 days.  Hx of CA and worried its coming back.   10+ episodes of diarrhea.  1x of vomiting.   No urine changes.      Abdominal Pain                Visit Vitals  /81 (BP Location: Left arm)   Pulse 93   Temp 98.9 °F (37.2 °C) (Oral)   Resp 18   LMP  (LMP Unknown)   SpO2 97%   OB Status Postmenopausal   Smoking Status Never        Physical Exam  Vitals reviewed.   Constitutional:       General: She is not in acute distress.     Appearance: She is well-developed. She is not diaphoretic.   HENT:      Head: Normocephalic and atraumatic.      Right Ear: External ear normal.      Left Ear: External ear normal.      Nose: Nose normal.   Eyes:      General:         Right eye: No discharge.         Left eye: No discharge.      Pupils: Pupils are equal, round, and reactive to light.   Neck:      Trachea: No tracheal deviation.   Cardiovascular:      Rate and Rhythm: Normal rate and regular rhythm.      Heart sounds: Normal heart sounds. No murmur heard.  Pulmonary:      Effort: Pulmonary effort is normal. No respiratory distress.      Breath sounds: Normal breath sounds. No stridor.   Abdominal:      General: There is no distension.      Palpations: Abdomen is soft.      Tenderness: There is abdominal tenderness in the epigastric area. There is no guarding or rebound.   Musculoskeletal:         General: Normal range of motion.      Cervical back: Normal range of motion and neck supple.   Skin:     General:  Skin is warm and dry.      Coloration: Skin is not pale.      Findings: No erythema.   Neurological:      General: No focal deficit present.      Mental Status: She is alert and oriented to person, place, and time.                       Medications - No data to display          Labs Reviewed   CBC AND DIFFERENTIAL - Abnormal       Result Value Ref Range Status    WBC 5.66  4.31 - 10.16 Thousand/uL Final    RBC 4.59  3.81 - 5.12 Million/uL Final    Hemoglobin 11.4 (*) 11.5 - 15.4 g/dL Final    Hematocrit 36.6  34.8 - 46.1 % Final    MCV 80 (*) 82 - 98 fL Final    MCH 24.8 (*) 26.8 - 34.3 pg Final    MCHC 31.1 (*) 31.4 - 37.4 g/dL Final    RDW 18.6 (*) 11.6 - 15.1 % Final    MPV 9.7  8.9 - 12.7 fL Final    Platelets 290  149 - 390 Thousands/uL Final    nRBC 0  /100 WBCs Final    Segmented % 66  43 - 75 % Final    Immature Grans % 0  0 - 2 % Final    Lymphocytes % 23  14 - 44 % Final    Monocytes % 10  4 - 12 % Final    Eosinophils Relative 1  0 - 6 % Final    Basophils Relative 0  0 - 1 % Final    Absolute Neutrophils 3.70  1.85 - 7.62 Thousands/µL Final    Absolute Immature Grans 0.02  0.00 - 0.20 Thousand/uL Final    Absolute Lymphocytes 1.32  0.60 - 4.47 Thousands/µL Final    Absolute Monocytes 0.54  0.17 - 1.22 Thousand/µL Final    Eosinophils Absolute 0.06  0.00 - 0.61 Thousand/µL Final    Basophils Absolute 0.02  0.00 - 0.10 Thousands/µL Final   COMPREHENSIVE METABOLIC PANEL - Abnormal    Sodium 136  135 - 147 mmol/L Final    Potassium 4.0  3.5 - 5.3 mmol/L Final    Chloride 107  96 - 108 mmol/L Final    CO2 22  21 - 32 mmol/L Final    ANION GAP 7  4 - 13 mmol/L Final    BUN 18  5 - 25 mg/dL Final    Creatinine 0.72  0.60 - 1.30 mg/dL Final    Comment: Standardized to IDMS reference method    Glucose 95  65 - 140 mg/dL Final    Comment: If the patient is fasting, the ADA then defines impaired fasting glucose as > 100 mg/dL and diabetes as > or equal to 123 mg/dL.    Calcium 8.6  8.4 - 10.2 mg/dL Final    AST 69  (*) 13 - 39 U/L Final    ALT 44  7 - 52 U/L Final    Comment: Specimen collection should occur prior to Sulfasalazine administration due to the potential for falsely depressed results.     Alkaline Phosphatase 106 (*) 34 - 104 U/L Final    Total Protein 7.6  6.4 - 8.4 g/dL Final    Albumin 4.1  3.5 - 5.0 g/dL Final    Total Bilirubin 0.31  0.20 - 1.00 mg/dL Final    Comment: Use of this assay is not recommended for patients undergoing treatment with eltrombopag due to the potential for falsely elevated results.  N-acetyl-p-benzoquinone imine (metabolite of Acetaminophen) will generate erroneously low results in samples for patients that have taken an overdose of Acetaminophen.    eGFR 89  ml/min/1.73sq m Final    Narrative:     [employer] guidelines for Chronic Kidney Disease (CKD):     Stage 1 with normal or high GFR (GFR > 90 mL/min/1.73 square meters)    Stage 2 Mild CKD (GFR = 60-89 mL/min/1.73 square meters)    Stage 3A Moderate CKD (GFR = 45-59 mL/min/1.73 square meters)    Stage 3B Moderate CKD (GFR = 30-44 mL/min/1.73 square meters)    Stage 4 Severe CKD (GFR = 15-29 mL/min/1.73 square meters)    Stage 5 End Stage CKD (GFR <15 mL/min/1.73 square meters)  Note: GFR calculation is accurate only with a steady state creatinine   LIPASE - Normal    Lipase 25  11 - 82 u/L Final         CT abdomen pelvis with contrast    (Results Pending)                  Procedures              Kt Jacob,   02/04/25 [id number]

## 2025-02-05 RX ORDER — PANTOPRAZOLE SODIUM 40 MG/1
40 TABLET, DELAYED RELEASE ORAL DAILY
Qty: 90 TABLET | Refills: 1 | Status: SHIPPED | OUTPATIENT
Start: 2025-02-05

## 2025-02-05 NOTE — DISCHARGE INSTRUCTIONS
Drink plenty of fluids to stay well hydrated. You may take zofran 4mg every 6 hours as needed for nausea.  Gradually advance your diet to plain foods & ultimately regular foods over the next few days as you feel improved.

## 2025-02-05 NOTE — ED PROVIDER NOTES
Time reflects when diagnosis was documented in both MDM as applicable and the Disposition within this note       Time User Action Codes Description Comment    2/4/2025  8:46 PM Mary Ferris Add [K52.9] Gastroenteritis           ED Disposition       ED Disposition   Discharge    Condition   Stable    Date/Time   Tue Feb 4, 2025  8:45 PM    Comment   Beatriz Bustilloajal discharge to home/self care.                   Assessment & Plan       Medical Decision Making  Risk  OTC drugs.  Prescription drug management.        ED Course as of 02/05/25 0022   Tue Feb 04, 2025 1919 Differential diagnosis includes but is not limited to viral gastroenteritis, foodborne enteritis, diverticulitis, colitis, pancreatitis (unlikely given returned to normal lipase), choledocholithiasis, hepatitis, partial bowel obstruction.  Doubt ACS or urinary pathology.   2023 Epigastric discomfort and nausea improved though not resolved.  Ondansetron (second dose) and Maalox ordered.  CT report images have been obtained.  Radiology interpretation pending.   2038 Stable right hepatic cyst.  Bladder wall thickening (will check UA).  Umbilical hernia remains present without evidence of incarceration.    Patient to p.o. challenge and provide urine specimen.   2045 Patient feeling much improved at this time.  Tolerating p.o.  Will provide urine specimen shortly.  Spoke with her as well as daughter and  at bedside.  Very likely viral etiology to symptoms.  Will prescribe ondansetron.  Confirmed patient's preferred pharmacy.  Work note will be provided for today and tomorrow.  Follow-up with PCP if symptoms continue beyond the next couple of days.   2126 Patient headed to restroom to provide urine specimen.   2148 Urine specimen provided and being sent to lab at this time.  Patient continues feeling well.  Will discharge at this point.  She will be contacted by phone and antibiotic prescribed if infection identified.     2/5 0021 - UA  "results reviewed.  Negative for leuk esterase & nitrites.  No bacteria visualized.  Not c/w UTI.  No abx indicated.    Medications   sodium chloride 0.9 % bolus 500 mL (0 mL Intravenous Stopped 2/4/25 2200)   ondansetron (ZOFRAN) injection 4 mg (4 mg Intravenous Given 2/4/25 1919)   acetaminophen (Ofirmev) injection 1,000 mg (0 mg Intravenous Stopped 2/4/25 2050)   iohexol (OMNIPAQUE) 350 MG/ML injection (MULTI-DOSE) 100 mL (100 mL Intravenous Given 2/4/25 1942)   ondansetron (ZOFRAN) injection 4 mg (4 mg Intravenous Given 2/4/25 2101)   aluminum-magnesium hydroxide-simethicone (MAALOX) oral suspension 30 mL (30 mL Oral Given 2/4/25 2102)       ED Risk Strat Scores                                              History of Present Illness       Chief Complaint   Patient presents with    Abdominal Pain     Abd pain and diarrhea since Sunday       Past Medical History:   Diagnosis Date    Abdominal pain     \"burning pain\" came to the ED    Chronic pain disorder     Colon cancer (HCC) 11/12/23    Renal disorder     Wears dentures     permanent upper denture    Weight loss     20 lb wt loss in 2-3 weeks d/t stomach issues      Past Surgical History:   Procedure Laterality Date    CHOLECYSTECTOMY      EGD AND COLONOSCOPY  03/23/2020    EXPLORATORY LAPAROTOMY W/ BOWEL RESECTION N/A 11/12/2023    Procedure: LAPAROTOMY OPEN W/ RIGHT COLON RESECTION;  Surgeon: Juan Obrien MD;  Location: [facility];  Service: General    TUBAL LIGATION      at 36       Family History   Problem Relation Age of Onset    Arthritis Mother     Heart attack Father     Coronary artery disease Father     Glaucoma Father     Heart disease Father       Social History     Tobacco Use    Smoking status: Never    Smokeless tobacco: Never    Tobacco comments:     social    Vaping Use    Vaping status: Never Used   Substance Use Topics    Alcohol use: Not Currently    Drug use: Never      E-Cigarette/Vaping    E-Cigarette Use Never User     "   E-Cigarette/Vaping Substances    Nicotine No     THC No     CBD No     Flavoring No     Other No     Unknown No       I have reviewed and agree with the history as documented.     Patient is a 64-year-old female, primarily Chinese-speaking, who presents to the emergency department for evaluation with vomiting, diarrhea and burning sensation in the abdomen.  [employer]  [id number] was used to facilitate encounter.  Symptoms started overnight between Sunday and Monday approximately 40 hours ago.  She describes having a burning sensation in the entire center of the abdomen.  She describes having had repeated episodes of nonbloody, nonbilious emesis and liquidy nonbloody diarrhea.  She relates that as a result of the symptoms she has felt very weak and has been resting in bed much of the day.  She did feel warm and suspected she had a fever prompting her to take ibuprofen with relief.  Today she was able to consume a banana and a bit of tea without vomiting these.  No chest discomfort, palpitations, dyspnea, cough or urinary symptoms including but not limited to dysuria, frequency or hematuria.  No recent antibiotic use, travel or known sick contacts.  She relates that the evening prior to onset of symptoms she did consume popcorn with a large amount of water as well as a beverage containing a large amount of ice and suspected that these may have triggered her symptoms.  She does gesture to lower abdominal midline incision and explains that she has a history of a lipoma.  (Clarified with patient that this was a lipoma and not lymphoma cancer.  She relates that this is not cancerous and that she did not she did not require chemotherapy or radiation.  Upon subsequent chart review do note a history of B-cell lymphoma for which she underwent right colon resection in late 2023.) abdominal surgical history additionally includes cholecystectomy and tubal ligation        Review of Systems   All other systems reviewed and  are negative.          Objective       ED Triage Vitals [02/04/25 1447]   Temperature Pulse Blood Pressure Respirations SpO2 Patient Position - Orthostatic VS   98.9 °F (37.2 °C) 82 151/65 18 99 % Sitting      Temp Source Heart Rate Source BP Location FiO2 (%) Pain Score    Oral Monitor Right arm -- --      Vitals      Date and Time Temp Pulse SpO2 Resp BP Pain Score FACES Pain Rating User   02/04/25 1750 -- 93 97 % 18 169/81 -- -- CB   02/04/25 1447 98.9 °F (37.2 °C) 82 99 % 18 151/65 -- -- NP            Physical Exam  Vitals and nursing note reviewed.   Constitutional:       Appearance: She is well-developed. She is ill-appearing.      Comments: Appears malaised   HENT:      Mouth/Throat:      Mouth: Mucous membranes are moist.   Cardiovascular:      Rate and Rhythm: Normal rate and regular rhythm.   Pulmonary:      Effort: Pulmonary effort is normal.      Breath sounds: Normal breath sounds.   Abdominal:      General: Abdomen is flat. Bowel sounds are normal.      Palpations: Abdomen is soft.      Tenderness: There is abdominal tenderness in the right upper quadrant, epigastric area, periumbilical area and left upper quadrant. There is no right CVA tenderness, left CVA tenderness or guarding.   Skin:     General: Skin is warm and dry.   Neurological:      Mental Status: She is alert.   Psychiatric:         Mood and Affect: Mood normal.         Behavior: Behavior normal.         Results Reviewed       Procedure Component Value Units Date/Time    Urine Microscopic [[id number]]  (Abnormal) Collected: 02/04/25 2151    Lab Status: Final result Specimen: Urine, Clean Catch Updated: 02/04/25 2209     RBC, UA 4-10 /hpf      WBC, UA None Seen /hpf      Epithelial Cells Occasional /hpf      Bacteria, UA None Seen /hpf      MUCUS THREADS Occasional    UA w Reflex to Microscopic w Reflex to Culture [[id number]]  (Abnormal) Collected: 02/04/25 2151    Lab Status: Final result Specimen: Urine, Clean Catch Updated: 02/04/25 2201      Color, UA Light Yellow     Clarity, UA Clear     Specific Gravity, UA >=1.050     pH, UA 6.5     Leukocytes, UA Negative     Nitrite, UA Negative     Protein, UA 50 (1+) mg/dl      Glucose, UA Negative mg/dl      Ketones, UA Negative mg/dl      Urobilinogen, UA <2.0 mg/dl      Bilirubin, UA Negative     Occult Blood, UA Moderate    Comprehensive metabolic panel [[id number]]  (Abnormal) Collected: 02/04/25 1451    Lab Status: Final result Specimen: Blood from Arm, Left Updated: 02/04/25 1533     Sodium 136 mmol/L      Potassium 4.0 mmol/L      Chloride 107 mmol/L      CO2 22 mmol/L      ANION GAP 7 mmol/L      BUN 18 mg/dL      Creatinine 0.72 mg/dL      Glucose 95 mg/dL      Calcium 8.6 mg/dL      AST 69 U/L      ALT 44 U/L      Alkaline Phosphatase 106 U/L      Total Protein 7.6 g/dL      Albumin 4.1 g/dL      Total Bilirubin 0.31 mg/dL      eGFR 89 ml/min/1.73sq m     Narrative:      National Kid[employer] Foundation guidelines for Chronic Kidney Disease (CKD):     Stage 1 with normal or high GFR (GFR > 90 mL/min/1.73 square meters)    Stage 2 Mild CKD (GFR = 60-89 mL/min/1.73 square meters)    Stage 3A Moderate CKD (GFR = 45-59 mL/min/1.73 square meters)    Stage 3B Moderate CKD (GFR = 30-44 mL/min/1.73 square meters)    Stage 4 Severe CKD (GFR = 15-29 mL/min/1.73 square meters)    Stage 5 End Stage CKD (GFR <15 mL/min/1.73 square meters)  Note: GFR calculation is accurate only with a steady state creatinine    Lipase [[id number]]  (Normal) Collected: 02/04/25 1451    Lab Status: Final result Specimen: Blood from Arm, Left Updated: 02/04/25 1533     Lipase 25 u/L     CBC and differential [[id number]]  (Abnormal) Collected: 02/04/25 1451    Lab Status: Final result Specimen: Blood from Arm, Left Updated: 02/04/25 1519     WBC 5.66 Thousand/uL      RBC 4.59 Million/uL      Hemoglobin 11.4 g/dL      Hematocrit 36.6 %      MCV 80 fL      MCH 24.8 pg      MCHC 31.1 g/dL      RDW 18.6 %      MPV 9.7 fL      Platelets  290 Thousands/uL      nRBC 0 /100 WBCs      Segmented % 66 %      Immature Grans % 0 %      Lymphocytes % 23 %      Monocytes % 10 %      Eosinophils Relative 1 %      Basophils Relative 0 %      Absolute Neutrophils 3.70 Thousands/µL      Absolute Immature Grans 0.02 Thousand/uL      Absolute Lymphocytes 1.32 Thousands/µL      Absolute Monocytes 0.54 Thousand/µL      Eosinophils Absolute 0.06 Thousand/µL      Basophils Absolute 0.02 Thousands/µL             CT abdomen pelvis with contrast   Final Interpretation by Cortez Carroll MD (02/04 2032)      Bladder wall thickening. Correlate for cystitis.      Enlarging umbilical hernia containing fat. No induration to suggest incarceration.      Fatty liver with stable lobulated right lobe cyst as described.      Stable 2 mm left-sided intrarenal calculi without hydronephrosis.         Workstation performed: [id number]             Procedures    ED Medication and Procedure Management   Prior to Admission Medications   Prescriptions Last Dose Informant Patient Reported? Taking?   Calcium Carbonate (CALCIUM 500 PO)  Self Yes No   Sig: Take 1 tablet by mouth 2 (two) times a day   Patient not taking: Reported on 12/20/2024   Melatonin 10 MG SUBL  Self Yes No   Sig: Place under the tongue   acetaminophen (TYLENOL) 325 mg tablet  Self No No   Sig: Take 1.5 tablets (488 mg total) by mouth every 6 (six) hours as needed for mild pain   cetirizine (ZyrTEC Allergy) 10 mg tablet  Self Yes No   Sig: Take 10 mg by mouth daily   cholecalciferol (VITAMIN D3) 1,000 units tablet  Self Yes No   Sig: Take 1,000 Units by mouth daily   Patient not taking: Reported on 12/20/2024   cyanocobalamin (VITAMIN B-12) 1000 MCG tablet  Self Yes No   Sig: Take 1,000 mcg by mouth daily   Patient not taking: Reported on 12/20/2024   famotidine (PEPCID) 20 mg tablet  Self Yes No   Sig: Take 20 mg by mouth 2 (two) times a day   gabapentin (NEURONTIN) 300 mg capsule  Self Yes No   Sig: Take 300 mg by mouth    lidocaine (LIDODERM) 5 %  Self No No   Sig: Apply 1 patch topically over 12 hours every 24 hours Remove & Discard patch within 12 hours or as directed by MD   magnesium citrate solution  Self Yes No   Sig: Take 296 mL by mouth once   Patient not taking: Reported on 12/20/2024   meloxicam (MOBIC) 15 mg tablet  Self Yes No   Sig: Take 15 mg by mouth daily   methocarbamol (ROBAXIN) 500 mg tablet   No No   Sig: Take 1 tablet (500 mg total) by mouth 2 (two) times a day for 3 days   Patient not taking: Reported on 12/20/2024   multivitamin (THERAGRAN) TABS  Self Yes No   Sig: Take 1 tablet by mouth daily   Patient not taking: Reported on 12/20/2024   pantoprazole (PROTONIX) 40 mg tablet   No No   Sig: Take 1 tablet (40 mg total) by mouth daily   sodium picosulfate, magnesium oxide, citric acid (Clenpiq) oral solution   No No   Sig: Take 175 mL (1 bottle) the evening before the colonoscopy, between 5 PM and 9 PM, followed by a second 175 mL bottle 5 hours before the colonoscopy.      Facility-Administered Medications: None     Discharge Medication List as of 2/4/2025  9:53 PM        START taking these medications    Details   ondansetron (ZOFRAN-ODT) 4 mg disintegrating tablet Take 1 tablet (4 mg total) by mouth every 6 (six) hours as needed for nausea or vomiting, Starting Tue 2/4/2025, Normal           CONTINUE these medications which have NOT CHANGED    Details   acetaminophen (TYLENOL) 325 mg tablet Take 1.5 tablets (488 mg total) by mouth every 6 (six) hours as needed for mild pain, Starting Thu 11/16/2023, OTC      Calcium Carbonate (CALCIUM 500 PO) Take 1 tablet by mouth 2 (two) times a day, Historical Med      cetirizine (ZyrTEC Allergy) 10 mg tablet Take 10 mg by mouth daily, Starting Wed 11/20/2024, Until Sun 1/19/2025, Historical Med      cholecalciferol (VITAMIN D3) 1,000 units tablet Take 1,000 Units by mouth daily, Historical Med      cyanocobalamin (VITAMIN B-12) 1000 MCG tablet Take 1,000 mcg by mouth  daily, Historical Med      famotidine (PEPCID) 20 mg tablet Take 20 mg by mouth 2 (two) times a day, Starting Wed 11/20/2024, Historical Med      gabapentin (NEURONTIN) 300 mg capsule Take 300 mg by mouth, Starting Mon 11/4/2024, Until Sat 5/3/2025 at 2359, Historical Med      lidocaine (LIDODERM) 5 % Apply 1 patch topically over 12 hours every 24 hours Remove & Discard patch within 12 hours or as directed by MD, Starting Thu 5/9/2024, Normal      magnesium citrate solution Take 296 mL by mouth once, Historical Med      Melatonin 10 MG SUBL Place under the tongue, Historical Med      meloxicam (MOBIC) 15 mg tablet Take 15 mg by mouth daily, Starting Mon 11/4/2024, Until Sat 5/3/2025, Historical Med      methocarbamol (ROBAXIN) 500 mg tablet Take 1 tablet (500 mg total) by mouth 2 (two) times a day for 3 days, Starting Thu 5/9/2024, Until Sun 5/12/2024, Normal      multivitamin (THERAGRAN) TABS Take 1 tablet by mouth daily, Historical Med      pantoprazole (PROTONIX) 40 mg tablet Take 1 tablet (40 mg total) by mouth daily, Starting Fri 1/10/2025, Normal      sodium picosulfate, magnesium oxide, citric acid (Clenpiq) oral solution Take 175 mL (1 bottle) the evening before the colonoscopy, between 5 PM and 9 PM, followed by a second 175 mL bottle 5 hours before the colonoscopy., Normal           No discharge procedures on file.  ED SEPSIS DOCUMENTATION   Time reflects when diagnosis was documented in both MDM as applicable and the Disposition within this note       Time User Action Codes Description Comment    2/4/2025  8:46 PM Mary Ferris Add [K52.9] Gastroenteritis                  Mary Ferris MD  02/05/25 0022

## 2025-03-01 ENCOUNTER — APPOINTMENT (OUTPATIENT)
Dept: LAB | Facility: HOSPITAL | Age: 65
End: 2025-03-01
Payer: COMMERCIAL

## 2025-03-01 DIAGNOSIS — R94.8 ABNORMAL POSITRON EMISSION TOMOGRAPHY (PET) SCAN: ICD-10-CM

## 2025-03-01 DIAGNOSIS — D50.9 IRON DEFICIENCY ANEMIA, UNSPECIFIED IRON DEFICIENCY ANEMIA TYPE: ICD-10-CM

## 2025-03-01 DIAGNOSIS — C83.33 DIFFUSE LARGE B-CELL LYMPHOMA OF INTRA-ABDOMINAL LYMPH NODES (HCC): ICD-10-CM

## 2025-03-01 DIAGNOSIS — D50.8 OTHER IRON DEFICIENCY ANEMIA: ICD-10-CM

## 2025-03-01 LAB
ALBUMIN SERPL BCG-MCNC: 4.1 G/DL (ref 3.5–5)
ALP SERPL-CCNC: 73 U/L (ref 34–104)
ALT SERPL W P-5'-P-CCNC: 15 U/L (ref 7–52)
ANION GAP SERPL CALCULATED.3IONS-SCNC: 8 MMOL/L (ref 4–13)
AST SERPL W P-5'-P-CCNC: 17 U/L (ref 13–39)
BASOPHILS # BLD AUTO: 0.03 THOUSANDS/ÂΜL (ref 0–0.1)
BASOPHILS NFR BLD AUTO: 0 % (ref 0–1)
BILIRUB SERPL-MCNC: 0.42 MG/DL (ref 0.2–1)
BUN SERPL-MCNC: 12 MG/DL (ref 5–25)
CALCIUM SERPL-MCNC: 8.9 MG/DL (ref 8.4–10.2)
CHLORIDE SERPL-SCNC: 105 MMOL/L (ref 96–108)
CO2 SERPL-SCNC: 28 MMOL/L (ref 21–32)
CREAT SERPL-MCNC: 0.52 MG/DL (ref 0.6–1.3)
EOSINOPHIL # BLD AUTO: 0.34 THOUSAND/ÂΜL (ref 0–0.61)
EOSINOPHIL NFR BLD AUTO: 4 % (ref 0–6)
ERYTHROCYTE [DISTWIDTH] IN BLOOD BY AUTOMATED COUNT: 17.2 % (ref 11.6–15.1)
FERRITIN SERPL-MCNC: 19 NG/ML (ref 11–307)
GFR SERPL CREATININE-BSD FRML MDRD: 102 ML/MIN/1.73SQ M
GLUCOSE P FAST SERPL-MCNC: 86 MG/DL (ref 65–99)
HCT VFR BLD AUTO: 36.8 % (ref 34.8–46.1)
HGB BLD-MCNC: 11.4 G/DL (ref 11.5–15.4)
IGA SERPL-MCNC: 235 MG/DL (ref 66–433)
IMM GRANULOCYTES # BLD AUTO: 0.03 THOUSAND/UL (ref 0–0.2)
IMM GRANULOCYTES NFR BLD AUTO: 0 % (ref 0–2)
IRON SATN MFR SERPL: 15 % (ref 15–50)
IRON SERPL-MCNC: 74 UG/DL (ref 50–212)
LDH SERPL-CCNC: 148 U/L (ref 140–271)
LYMPHOCYTES # BLD AUTO: 2.35 THOUSANDS/ÂΜL (ref 0.6–4.47)
LYMPHOCYTES NFR BLD AUTO: 28 % (ref 14–44)
MCH RBC QN AUTO: 25.6 PG (ref 26.8–34.3)
MCHC RBC AUTO-ENTMCNC: 31 G/DL (ref 31.4–37.4)
MCV RBC AUTO: 83 FL (ref 82–98)
MONOCYTES # BLD AUTO: 0.56 THOUSAND/ÂΜL (ref 0.17–1.22)
MONOCYTES NFR BLD AUTO: 7 % (ref 4–12)
NEUTROPHILS # BLD AUTO: 5.16 THOUSANDS/ÂΜL (ref 1.85–7.62)
NEUTS SEG NFR BLD AUTO: 61 % (ref 43–75)
NRBC BLD AUTO-RTO: 0 /100 WBCS
PLATELET # BLD AUTO: 330 THOUSANDS/UL (ref 149–390)
PMV BLD AUTO: 9.6 FL (ref 8.9–12.7)
POTASSIUM SERPL-SCNC: 3.8 MMOL/L (ref 3.5–5.3)
PROT SERPL-MCNC: 7.4 G/DL (ref 6.4–8.4)
RBC # BLD AUTO: 4.46 MILLION/UL (ref 3.81–5.12)
SODIUM SERPL-SCNC: 141 MMOL/L (ref 135–147)
TIBC SERPL-MCNC: 485.8 UG/DL (ref 250–450)
TRANSFERRIN SERPL-MCNC: 347 MG/DL (ref 203–362)
UIBC SERPL-MCNC: 412 UG/DL (ref 155–355)
WBC # BLD AUTO: 8.47 THOUSAND/UL (ref 4.31–10.16)

## 2025-03-01 PROCEDURE — 85025 COMPLETE CBC W/AUTO DIFF WBC: CPT

## 2025-03-01 PROCEDURE — 83550 IRON BINDING TEST: CPT

## 2025-03-01 PROCEDURE — 82784 ASSAY IGA/IGD/IGG/IGM EACH: CPT

## 2025-03-01 PROCEDURE — 82728 ASSAY OF FERRITIN: CPT

## 2025-03-01 PROCEDURE — 36415 COLL VENOUS BLD VENIPUNCTURE: CPT

## 2025-03-01 PROCEDURE — 88184 FLOWCYTOMETRY/ TC 1 MARKER: CPT

## 2025-03-01 PROCEDURE — 80053 COMPREHEN METABOLIC PANEL: CPT

## 2025-03-01 PROCEDURE — 88185 FLOWCYTOMETRY/TC ADD-ON: CPT | Performed by: PHYSICIAN ASSISTANT

## 2025-03-01 PROCEDURE — 86364 TISS TRNSGLTMNASE EA IG CLAS: CPT

## 2025-03-01 PROCEDURE — 83540 ASSAY OF IRON: CPT

## 2025-03-01 PROCEDURE — 83615 LACTATE (LD) (LDH) ENZYME: CPT

## 2025-03-02 LAB — TTG IGA SER IA-ACNC: 0.8 U/ML (ref ?–10)

## 2025-03-05 ENCOUNTER — RESULTS FOLLOW-UP (OUTPATIENT)
Dept: GASTROENTEROLOGY | Facility: AMBULARY SURGERY CENTER | Age: 65
End: 2025-03-05

## 2025-03-05 ENCOUNTER — HOSPITAL ENCOUNTER (OUTPATIENT)
Dept: RADIOLOGY | Age: 65
Discharge: HOME | End: 2025-03-05
Payer: COMMERCIAL

## 2025-03-05 DIAGNOSIS — R94.8 ABNORMAL POSITRON EMISSION TOMOGRAPHY (PET) SCAN: ICD-10-CM

## 2025-03-05 DIAGNOSIS — C83.33 DIFFUSE LARGE B-CELL LYMPHOMA OF INTRA-ABDOMINAL LYMPH NODES (HCC): ICD-10-CM

## 2025-03-05 LAB
GLUCOSE SERPL-MCNC: 94 MG/DL (ref 65–140)
SCAN RESULT: NORMAL

## 2025-03-05 PROCEDURE — 78815 PET IMAGE W/CT SKULL-THIGH: CPT

## 2025-03-05 PROCEDURE — 82948 REAGENT STRIP/BLOOD GLUCOSE: CPT

## 2025-03-05 PROCEDURE — A9552 F18 FDG: HCPCS

## 2025-03-24 ENCOUNTER — TELEPHONE (OUTPATIENT)
Dept: HEMATOLOGY ONCOLOGY | Facility: CLINIC | Age: 65
End: 2025-03-24

## 2025-03-24 ENCOUNTER — OFFICE VISIT (OUTPATIENT)
Dept: HEMATOLOGY ONCOLOGY | Facility: CLINIC | Age: 65
End: 2025-03-24
Payer: COMMERCIAL

## 2025-03-24 VITALS
DIASTOLIC BLOOD PRESSURE: 78 MMHG | RESPIRATION RATE: 18 BRPM | OXYGEN SATURATION: 98 % | SYSTOLIC BLOOD PRESSURE: 140 MMHG | TEMPERATURE: 97.3 F | HEIGHT: 59 IN | HEART RATE: 91 BPM | BODY MASS INDEX: 36.08 KG/M2 | WEIGHT: 179 LBS

## 2025-03-24 DIAGNOSIS — C83.33 DIFFUSE LARGE B-CELL LYMPHOMA OF INTRA-ABDOMINAL LYMPH NODES (HCC): Primary | ICD-10-CM

## 2025-03-24 PROCEDURE — 99213 OFFICE O/P EST LOW 20 MIN: CPT | Performed by: INTERNAL MEDICINE

## 2025-03-24 NOTE — PROGRESS NOTES
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 female seen initially 2023 regarding diffuse large B-cell lymphoma of the colon.     Past medical history of Bell's palsy     Presented to the hospital on 2023 with 3-week history of abdominal pain and weight loss of 20 lbs over the same time. She did not have any fever, chills, night sweats, nausea, vomiting, diarrhea, constipation.      CT abdomen at that time showed 1) Intussusception of the cecum and terminal ileum into the ascending colon secondary to a 5.1 x 2.7 cm soft tissue density, highly suspicious for an underlying mass; Several adjacent prominent mesenteric lymph nodes, which may be reactive, although metastatic disease is not entirely excluded.      23 exploratory laparotomy, right hemicolectomy and liver cyst excision by Dr. Obrien.  Pathology from the right hemicolectomy specimen identified diffuse large B-cell lymphoma, germinal center phenotype. BCL 6 positive but negative myc and BCL-2.      1 regional lymph node positive for involvement by B-cell lymphoma. 17 total lymph nodes present.  No atypia or malignancy in the liver cyst excision     CBCD, quantitative immunoglobulins, LDH, CMP, chronic hepatitis panel were requested at her initial visit but not drawn     24 pet/ct - No focal tracer activity definitive for hypermetabolic malignancy. However, note is made of a few indeterminant hypermetabolic findings with underlying hypermetabolic malignancy at these sites not excluded including:  Fairly intense nodular FDG activity along the bilateral aspects of the enterocolonic anastomosis where soft tissue prominence which is poorly characterized on low-dose  unenhanced CT is noted. While findings could reflect inflammatory postsurgical changes, given the nodular activity, underlying hypermetabolic malignancy at the enterocolonic anastomosis cannot be excluded.     -Intensely FDG avid symmetric soft tissue prominence in the bilateral tonsillar regions. While the symmetry favors benign physiologic/inflammatory activity, there are additional nearby bilateral subcentimeter minimally FDG avid cervical lymph nodes and while the constellation of findings again could reflect benign inflammatory process, given history of lymphoma, lymphomatous involvement in the cervical region cannot be excluded.     -Asymmetric FDG avid cutaneous/subcutaneous activity involving the left facial cheek; correlation with direct visualization is recommended to exclude cutaneous/subcutaneous malignancy in this location. This nonspecific finding could be inflammatory in etiology.        4/2024  The patient has no symptoms suggestive of lymphoma such as weight loss, fever, night sweats, constipation, nausea, vomiting or skin rash     Pet/ct 8/22/24-  Decreased focal FDG uptake near the proximal colonic anastomosis. There is heterogeneous moderate FDG uptake remaining favor postsurgical changes/physiologic activity.     2. Persistent fairly intense activity at the level of the bilateral palatine and lingual tonsils similar to the prior study.    Colonoscopy on 12/2020 for an EGD showed no evidence of recurrence of lymphoma, PET scan in March 2025 showed no evidence of recurrence of lymphoma    At this time we will follow-up in 6-month with a blood work only including CBC, CMP, LDH           No follow-ups on file.    History of Present Illness   Chief Complaint   Patient presents with    Follow-up     Oncology History   Cancer Staging   B-cell lymphoma (HCC)  Staging form: Hodgkin and Non-Hodgkin Lymphoma, AJCC 8th Edition  - Clinical: Stage IIE (Diffuse large B-cell lymphoma) - Signed by Jacqui Gordon  "MD on 12/12/2023  Stage prefix: Initial diagnosis  Oncology History   B-cell lymphoma (HCC)   12/4/2023 Initial Diagnosis    B-cell lymphoma (HCC)     12/12/2023 -  Cancer Staged    Staging form: Hodgkin and Non-Hodgkin Lymphoma, AJCC 8th Edition  - Clinical: Stage IIE (Diffuse large B-cell lymphoma) - Signed by Jacqui Gordon MD on 12/12/2023  Stage prefix: Initial diagnosis               Review of Systems        Objective   /78 (Patient Position: Sitting, Cuff Size: Large)   Pulse 91   Temp (!) 97.3 °F (36.3 °C) (Temporal)   Resp 18   Ht 4' 11\" (1.499 m)   Wt 81.2 kg (179 lb)   LMP  (LMP Unknown)   SpO2 98%   BMI 36.15 kg/m²     Pain Screening:  Pain Score: 0-No pain  ECOG   0  Physical Exam    Labs: I have reviewed the following labs:  Lab Results   Component Value Date/Time    WBC 8.47 03/01/2025 09:05 AM    RBC 4.46 03/01/2025 09:05 AM    Hemoglobin 11.4 (L) 03/01/2025 09:05 AM    Hematocrit 36.8 03/01/2025 09:05 AM    MCV 83 03/01/2025 09:05 AM    MCH 25.6 (L) 03/01/2025 09:05 AM    RDW 17.2 (H) 03/01/2025 09:05 AM    Platelets 330 03/01/2025 09:05 AM    Segmented % 61 03/01/2025 09:05 AM    Lymphocytes % 28 03/01/2025 09:05 AM    Monocytes % 7 03/01/2025 09:05 AM    Eosinophils Relative 4 03/01/2025 09:05 AM    Basophils Relative 0 03/01/2025 09:05 AM    Immature Grans % 0 03/01/2025 09:05 AM    Absolute Neutrophils 5.16 03/01/2025 09:05 AM     Lab Results   Component Value Date/Time    Potassium 3.8 03/01/2025 09:05 AM    Potassium 4.5 07/16/2024 06:04 AM    Chloride 105 03/01/2025 09:05 AM    Chloride 107 07/16/2024 06:04 AM    Carbon Dioxide 26 07/16/2024 06:04 AM    CO2 28 03/01/2025 09:05 AM    BUN 12 03/01/2025 09:05 AM    BUN 12 07/16/2024 06:04 AM    Creatinine 0.52 (L) 03/01/2025 09:05 AM    Creatinine 0.58 07/16/2024 06:04 AM    Glucose, Fasting 86 03/01/2025 09:05 AM    Calcium 8.9 03/01/2025 09:05 AM    Calcium 9.0 07/16/2024 06:04 AM    AST 17 03/01/2025 09:05 AM    AST 13 " 07/16/2024 06:04 AM    ALT 15 03/01/2025 09:05 AM    ALT 9 07/16/2024 06:04 AM    Alkaline Phosphatase 73 03/01/2025 09:05 AM    Alkaline Phosphatase 75 07/16/2024 06:04 AM    Total Protein 7.4 03/01/2025 09:05 AM    Protein, Total 7.0 07/16/2024 06:04 AM    Albumin 4.1 03/01/2025 09:05 AM    ALBUMIN 3.8 07/16/2024 06:04 AM    Total Bilirubin 0.42 03/01/2025 09:05 AM    Total Bilirubin 0.3 07/16/2024 06:04 AM    eGFRcr 101 07/16/2024 06:04 AM    eGFR 102 03/01/2025 09:05 AM

## 2025-03-24 NOTE — TELEPHONE ENCOUNTER
LM for patient to call back to schedule 6 mo f/u w/ labs with Sravanthi Dumont. Labs have been mailed to patient.

## 2025-03-24 NOTE — ASSESSMENT & PLAN NOTE
Beatriz Campos is 64 y.o. 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     Past medical history of Bell's palsy     Presented to the hospital on 11/11/2023 with 3-week history of abdominal pain and weight loss of 20 lbs over the same time. She did not have any fever, chills, night sweats, nausea, vomiting, diarrhea, constipation.      CT abdomen at that time showed 1) Intussusception of the cecum and terminal ileum into the ascending colon secondary to a 5.1 x 2.7 cm soft tissue density, highly suspicious for an underlying mass; Several adjacent prominent mesenteric lymph nodes, which may be reactive, although metastatic disease is not entirely excluded.      11/12/23 exploratory laparotomy, right hemicolectomy and liver cyst excision by Dr. Obrien.  Pathology from the right hemicolectomy specimen identified diffuse large B-cell lymphoma, germinal center phenotype. BCL 6 positive but negative myc and BCL-2.      1 regional lymph node positive for involvement by B-cell lymphoma. 17 total lymph nodes present.  No atypia or malignancy in the liver cyst excision     CBCD, quantitative immunoglobulins, LDH, CMP, chronic hepatitis panel were requested at her initial visit but not drawn     1/12/24 pet/ct - No focal tracer activity definitive for hypermetabolic malignancy. However, note is made of a few indeterminant hypermetabolic findings with underlying hypermetabolic malignancy at these sites not excluded including:  Fairly intense nodular FDG activity along the bilateral aspects of the enterocolonic anastomosis where soft tissue prominence which is poorly characterized on low-dose unenhanced CT is noted. While findings could reflect inflammatory postsurgical changes, given the nodular activity, underlying hypermetabolic malignancy at the enterocolonic anastomosis cannot be excluded.     -Intensely FDG avid symmetric soft tissue prominence in the bilateral tonsillar  regions. While the symmetry favors benign physiologic/inflammatory activity, there are additional nearby bilateral subcentimeter minimally FDG avid cervical lymph nodes and while the constellation of findings again could reflect benign inflammatory process, given history of lymphoma, lymphomatous involvement in the cervical region cannot be excluded.     -Asymmetric FDG avid cutaneous/subcutaneous activity involving the left facial cheek; correlation with direct visualization is recommended to exclude cutaneous/subcutaneous malignancy in this location. This nonspecific finding could be inflammatory in etiology.        4/2024  The patient has no symptoms suggestive of lymphoma such as weight loss, fever, night sweats, constipation, nausea, vomiting or skin rash     Pet/ct 8/22/24-  Decreased focal FDG uptake near the proximal colonic anastomosis. There is heterogeneous moderate FDG uptake remaining favor postsurgical changes/physiologic activity.     2. Persistent fairly intense activity at the level of the bilateral palatine and lingual tonsils similar to the prior study.    Colonoscopy on 12/2020 for an EGD showed no evidence of recurrence of lymphoma, PET scan in March 2025 showed no evidence of recurrence of lymphoma    At this time we will follow-up in 6-month with a blood work only including CBC, CMP, LDH

## 2025-06-11 ENCOUNTER — TELEPHONE (OUTPATIENT)
Age: 65
End: 2025-06-11

## 2025-06-11 NOTE — TELEPHONE ENCOUNTER
Pt has an appt on 6/26/25 that needs to be rescheduled due to a change in the Providers schedule. I called the pt through  #[phone] and left a message for the pt to call back and reschedule.

## 2025-06-12 ENCOUNTER — TELEPHONE (OUTPATIENT)
Age: 65
End: 2025-06-12

## 2025-06-12 NOTE — TELEPHONE ENCOUNTER
Pt was called through  #[id number] and message was left for pt to call back to reschedule the 6/26/25 appt that needs to be rescheduled.

## (undated) DEVICE — GAUZE SPONGES,16 PLY: Brand: CURITY

## (undated) DEVICE — ENSEAL 20 CM SHAFT, LARGE JAW: Brand: ENSEAL X1

## (undated) DEVICE — BETHLEHEM MAJOR GENERAL PACK: Brand: CARDINAL HEALTH

## (undated) DEVICE — LIGACLIP MCA MULTIPLE CLIP APPLIERS, 20 LARGE CLIPS: Brand: LIGACLIP

## (undated) DEVICE — PROXIMATE LINEAR CUTTER RELOAD, BLUE, 75MM: Brand: PROXIMATE

## (undated) DEVICE — UNDYED MONOFILAMENT (POLYDIOXANONE), ABSORBABLE SURGICAL SUTURE: Brand: PDS

## (undated) DEVICE — VIOLET BRAIDED (POLYGLACTIN 910), SYNTHETIC ABSORBABLE SUTURE: Brand: COATED VICRYL

## (undated) DEVICE — INTENDED FOR TISSUE SEPARATION, AND OTHER PROCEDURES THAT REQUIRE A SHARP SURGICAL BLADE TO PUNCTURE OR CUT.: Brand: BARD-PARKER SAFETY BLADES SIZE 15, STERILE

## (undated) DEVICE — PROXIMATE RELOADABLE LINEAR CUTTER WITH SAFETY LOCK-OUT, 75MM: Brand: PROXIMATE

## (undated) DEVICE — TRAY FOLEY 16FR URIMETER SURESTEP

## (undated) DEVICE — PLUMEPEN PRO 10FT

## (undated) DEVICE — INTENDED FOR TISSUE SEPARATION, AND OTHER PROCEDURES THAT REQUIRE A SHARP SURGICAL BLADE TO PUNCTURE OR CUT.: Brand: BARD-PARKER SAFETY BLADES SIZE 10, STERILE

## (undated) DEVICE — GLOVE SRG BIOGEL ECLIPSE 7

## (undated) DEVICE — SUT VICRYL 0 REEL 54 IN J287G

## (undated) DEVICE — ABDOMINAL PAD: Brand: DERMACEA

## (undated) DEVICE — POOLE SUCTION HANDLE: Brand: CARDINAL HEALTH